# Patient Record
Sex: MALE | Race: WHITE | NOT HISPANIC OR LATINO | Employment: FULL TIME | URBAN - METROPOLITAN AREA
[De-identification: names, ages, dates, MRNs, and addresses within clinical notes are randomized per-mention and may not be internally consistent; named-entity substitution may affect disease eponyms.]

---

## 2017-05-11 ENCOUNTER — HOSPITAL ENCOUNTER (OUTPATIENT)
Dept: FAMILY MEDICINE CLINIC | Facility: CLINIC | Age: 52
Setting detail: SPECIMEN
Discharge: HOME | End: 2017-05-11
Attending: FAMILY MEDICINE | Admitting: FAMILY MEDICINE

## 2017-05-11 LAB
C TRACH RRNA SPEC QL PROBE: NORMAL
N GONORRHOEA RRNA SPEC QL PROBE: NORMAL
SPECIMEN SOURCE: NORMAL

## 2018-03-01 ENCOUNTER — HOSPITAL ENCOUNTER (OUTPATIENT)
Dept: FAMILY MEDICINE CLINIC | Facility: CLINIC | Age: 53
Setting detail: SPECIMEN
Discharge: HOME | End: 2018-03-01
Attending: FAMILY MEDICINE | Admitting: FAMILY MEDICINE

## 2018-09-10 ENCOUNTER — ON CAMPUS - OUTPATIENT (AMBULATORY)
Dept: URBAN - METROPOLITAN AREA HOSPITAL 2 | Facility: HOSPITAL | Age: 53
End: 2018-09-10
Payer: COMMERCIAL

## 2018-09-10 VITALS
DIASTOLIC BLOOD PRESSURE: 74 MMHG | HEART RATE: 61 BPM | DIASTOLIC BLOOD PRESSURE: 50 MMHG | HEART RATE: 58 BPM | RESPIRATION RATE: 18 BRPM | HEART RATE: 47 BPM | HEART RATE: 54 BPM | DIASTOLIC BLOOD PRESSURE: 57 MMHG | HEART RATE: 57 BPM | SYSTOLIC BLOOD PRESSURE: 110 MMHG | HEART RATE: 51 BPM | WEIGHT: 168 LBS | DIASTOLIC BLOOD PRESSURE: 72 MMHG | HEART RATE: 52 BPM | SYSTOLIC BLOOD PRESSURE: 88 MMHG | SYSTOLIC BLOOD PRESSURE: 126 MMHG | SYSTOLIC BLOOD PRESSURE: 89 MMHG | TEMPERATURE: 97.9 F | SYSTOLIC BLOOD PRESSURE: 83 MMHG | DIASTOLIC BLOOD PRESSURE: 58 MMHG | DIASTOLIC BLOOD PRESSURE: 51 MMHG | OXYGEN SATURATION: 100 % | SYSTOLIC BLOOD PRESSURE: 104 MMHG | DIASTOLIC BLOOD PRESSURE: 78 MMHG | SYSTOLIC BLOOD PRESSURE: 105 MMHG | HEIGHT: 69 IN | RESPIRATION RATE: 16 BRPM | HEART RATE: 53 BPM | DIASTOLIC BLOOD PRESSURE: 70 MMHG

## 2018-09-10 DIAGNOSIS — K64.1 SECOND DEGREE HEMORRHOIDS: ICD-10-CM

## 2018-09-10 DIAGNOSIS — K57.30 DIVERTICULOSIS OF LARGE INTESTINE WITHOUT PERFORATION OR ABS: ICD-10-CM

## 2018-09-10 DIAGNOSIS — Z80.0 FAMILY HISTORY OF MALIGNANT NEOPLASM OF DIGESTIVE ORGANS: ICD-10-CM

## 2018-09-10 DIAGNOSIS — D17.79 BENIGN LIPOMATOUS NEOPLASM OF OTHER SITES: ICD-10-CM

## 2018-09-10 PROCEDURE — 45378 DIAGNOSTIC COLONOSCOPY: CPT | Mod: 33 | Performed by: INTERNAL MEDICINE

## 2018-09-11 ENCOUNTER — HOSPITAL ENCOUNTER (OUTPATIENT)
Dept: FAMILY MEDICINE CLINIC | Facility: CLINIC | Age: 53
Setting detail: SPECIMEN
Discharge: HOME | End: 2018-09-11
Attending: FAMILY MEDICINE | Admitting: FAMILY MEDICINE

## 2018-09-11 LAB
ALBUMIN SERPL-MCNC: 4.3 G/DL (ref 3.5–4.8)
ALBUMIN/GLOB SERPL: 1.5 {RATIO} (ref 1–1.7)
ALP SERPL-CCNC: 44 IU/L (ref 32–91)
ALT SERPL-CCNC: 16 IU/L (ref 17–63)
ANION GAP SERPL CALC-SCNC: 12.2 MMOL/L (ref 10–20)
AST SERPL-CCNC: 19 IU/L (ref 15–41)
BASOPHILS # BLD AUTO: 0.1 10*3/UL (ref 0–0.2)
BASOPHILS NFR BLD AUTO: 1 % (ref 0–2)
BILIRUB SERPL-MCNC: 0.8 MG/DL (ref 0.3–1.2)
BILIRUB UR QL STRIP: NEGATIVE MG/DL
BUN SERPL-MCNC: 9 MG/DL (ref 8–20)
BUN/CREAT SERPL: 9 (ref 6.2–20.3)
CALCIUM SERPL-MCNC: 9.1 MG/DL (ref 8.9–10.3)
CASTS URNS QL MICRO: NORMAL /[LPF]
CHLORIDE SERPL-SCNC: 103 MMOL/L (ref 101–111)
CHOLEST SERPL-MCNC: 175 MG/DL
CHOLEST/HDLC SERPL: 2.1 {RATIO}
COLOR UR: YELLOW
CONV BACTERIA IN URINE MICRO: NEGATIVE
CONV CLARITY OF URINE: CLEAR
CONV CO2: 28 MMOL/L (ref 22–32)
CONV HYALINE CASTS IN URINE MICRO: 0 /[LPF] (ref 0–5)
CONV LDL CHOLESTEROL DIRECT: 72 MG/DL (ref 0–100)
CONV PROTEIN IN URINE BY AUTOMATED TEST STRIP: NEGATIVE MG/DL
CONV SMALL ROUND CELLS: NORMAL /[HPF]
CONV TOTAL PROTEIN: 7.1 G/DL (ref 6.1–7.9)
CONV UROBILINOGEN IN URINE BY AUTOMATED TEST STRIP: 0.2 MG/DL
CREAT UR-MCNC: 1 MG/DL (ref 0.7–1.2)
CULTURE INDICATED?: NORMAL
DIFFERENTIAL METHOD BLD: (no result)
EOSINOPHIL # BLD AUTO: 0.1 10*3/UL (ref 0–0.3)
EOSINOPHIL # BLD AUTO: 1 % (ref 0–3)
ERYTHROCYTE [DISTWIDTH] IN BLOOD BY AUTOMATED COUNT: 14.1 % (ref 11.5–14.5)
GLOBULIN UR ELPH-MCNC: 2.8 G/DL (ref 2.5–3.8)
GLUCOSE SERPL-MCNC: 86 MG/DL (ref 65–99)
GLUCOSE UR QL: NEGATIVE MG/DL
HCT VFR BLD AUTO: 39.9 % (ref 40–54)
HDLC SERPL-MCNC: 82 MG/DL
HGB BLD-MCNC: 13.6 G/DL (ref 14–18)
HGB UR QL STRIP: NEGATIVE
KETONES UR QL STRIP: NEGATIVE MG/DL
LDLC/HDLC SERPL: 0.9 {RATIO}
LEUKOCYTE ESTERASE UR QL STRIP: NEGATIVE
LIPID INTERPRETATION: NORMAL
LYMPHOCYTES # BLD AUTO: 1.8 10*3/UL (ref 0.8–4.8)
LYMPHOCYTES NFR BLD AUTO: 27 % (ref 18–42)
MCH RBC QN AUTO: 30.9 PG (ref 26–32)
MCHC RBC AUTO-ENTMCNC: 34.1 G/DL (ref 32–36)
MCV RBC AUTO: 90.7 FL (ref 80–94)
MONOCYTES # BLD AUTO: 0.4 10*3/UL (ref 0.1–1.3)
MONOCYTES NFR BLD AUTO: 5 % (ref 2–11)
NEUTROPHILS # BLD AUTO: 4.5 10*3/UL (ref 2.3–8.6)
NEUTROPHILS NFR BLD AUTO: 66 % (ref 50–75)
NITRITE UR QL STRIP: NEGATIVE
NRBC BLD AUTO-RTO: 0 /100{WBCS}
NRBC/RBC NFR BLD MANUAL: 0 10*3/UL
PH UR STRIP.AUTO: 6.5 [PH] (ref 4.5–8)
PLATELET # BLD AUTO: 226 10*3/UL (ref 150–450)
PMV BLD AUTO: 9 FL (ref 7.4–10.4)
POTASSIUM SERPL-SCNC: 4.2 MMOL/L (ref 3.6–5.1)
RBC # BLD AUTO: 4.4 10*6/UL (ref 4.6–6)
RBC #/AREA URNS HPF: 0 /[HPF] (ref 0–3)
SODIUM SERPL-SCNC: 139 MMOL/L (ref 136–144)
SP GR UR: 1.01 (ref 1–1.03)
SPERM URNS QL MICRO: NORMAL /[HPF]
SQUAMOUS SPT QL MICRO: 0 /[HPF] (ref 0–5)
TRIGL SERPL-MCNC: 45 MG/DL
UNIDENT CRYS URNS QL MICRO: NORMAL /[HPF]
VLDLC SERPL CALC-MCNC: 20.9 MG/DL
WBC # BLD AUTO: 6.8 10*3/UL (ref 4.5–11.5)
WBC #/AREA URNS HPF: 0 /[HPF] (ref 0–5)
YEAST SPEC QL WET PREP: NORMAL /[HPF]

## 2019-07-02 RX ORDER — DEXTROAMPHETAMINE SACCHARATE, AMPHETAMINE ASPARTATE, DEXTROAMPHETAMINE SULFATE AND AMPHETAMINE SULFATE 5; 5; 5; 5 MG/1; MG/1; MG/1; MG/1
1 TABLET ORAL 2 TIMES DAILY
COMMUNITY
Start: 2012-11-05 | End: 2019-07-02 | Stop reason: SDUPTHER

## 2019-07-02 RX ORDER — DEXTROAMPHETAMINE SACCHARATE, AMPHETAMINE ASPARTATE, DEXTROAMPHETAMINE SULFATE AND AMPHETAMINE SULFATE 5; 5; 5; 5 MG/1; MG/1; MG/1; MG/1
1 TABLET ORAL 2 TIMES DAILY
Qty: 60 TABLET | Refills: 0 | Status: SHIPPED | OUTPATIENT
Start: 2019-07-02 | End: 2019-08-02 | Stop reason: SDUPTHER

## 2019-08-02 RX ORDER — DEXTROAMPHETAMINE SACCHARATE, AMPHETAMINE ASPARTATE, DEXTROAMPHETAMINE SULFATE AND AMPHETAMINE SULFATE 5; 5; 5; 5 MG/1; MG/1; MG/1; MG/1
1 TABLET ORAL 2 TIMES DAILY
Qty: 60 TABLET | Refills: 0 | Status: SHIPPED | OUTPATIENT
Start: 2019-08-02 | End: 2019-09-04 | Stop reason: SDUPTHER

## 2019-09-04 RX ORDER — DEXTROAMPHETAMINE SACCHARATE, AMPHETAMINE ASPARTATE, DEXTROAMPHETAMINE SULFATE AND AMPHETAMINE SULFATE 5; 5; 5; 5 MG/1; MG/1; MG/1; MG/1
1 TABLET ORAL 2 TIMES DAILY
Qty: 60 TABLET | Refills: 0 | Status: SHIPPED | OUTPATIENT
Start: 2019-09-04 | End: 2019-10-17 | Stop reason: SDUPTHER

## 2019-09-10 RX ORDER — MELOXICAM 15 MG/1
TABLET ORAL
Qty: 90 TABLET | Refills: 0 | Status: SHIPPED | OUTPATIENT
Start: 2019-09-10 | End: 2019-12-15 | Stop reason: SDUPTHER

## 2019-10-11 RX ORDER — DEXTROAMPHETAMINE SACCHARATE, AMPHETAMINE ASPARTATE, DEXTROAMPHETAMINE SULFATE AND AMPHETAMINE SULFATE 5; 5; 5; 5 MG/1; MG/1; MG/1; MG/1
1 TABLET ORAL 2 TIMES DAILY
Qty: 60 TABLET | Refills: 0 | OUTPATIENT
Start: 2019-10-11

## 2019-10-14 PROBLEM — H72.92 PERFORATION OF LEFT TYMPANIC MEMBRANE: Status: ACTIVE | Noted: 2019-04-06

## 2019-10-14 PROBLEM — A63.0 GENITAL WARTS: Status: ACTIVE | Noted: 2019-04-04

## 2019-10-14 PROBLEM — M54.50 LOW BACK PAIN: Status: ACTIVE | Noted: 2019-04-04

## 2019-10-14 PROBLEM — Z00.00 ENCOUNTER FOR GENERAL ADULT MEDICAL EXAMINATION WITHOUT ABNORMAL FINDINGS: Status: ACTIVE | Noted: 2018-09-11

## 2019-10-14 RX ORDER — TRAMADOL HYDROCHLORIDE 50 MG/1
TABLET ORAL
COMMUNITY
Start: 2016-11-09 | End: 2020-10-07 | Stop reason: SDUPTHER

## 2019-10-14 RX ORDER — FLUTICASONE PROPIONATE 50 MCG
SPRAY, SUSPENSION (ML) NASAL
COMMUNITY
Start: 2015-12-28 | End: 2022-02-26

## 2019-10-14 RX ORDER — BETAMETHASONE DIPROPIONATE 0.05 %
1 GEL (GRAM) TOPICAL AS NEEDED
COMMUNITY
Start: 2019-04-04 | End: 2021-11-19 | Stop reason: SDUPTHER

## 2019-10-17 ENCOUNTER — OFFICE VISIT (OUTPATIENT)
Dept: FAMILY MEDICINE CLINIC | Facility: CLINIC | Age: 54
End: 2019-10-17

## 2019-10-17 VITALS
TEMPERATURE: 98 F | WEIGHT: 179 LBS | OXYGEN SATURATION: 98 % | DIASTOLIC BLOOD PRESSURE: 83 MMHG | HEART RATE: 63 BPM | RESPIRATION RATE: 16 BRPM | HEIGHT: 69 IN | BODY MASS INDEX: 26.51 KG/M2 | SYSTOLIC BLOOD PRESSURE: 131 MMHG

## 2019-10-17 DIAGNOSIS — Z00.00 ENCOUNTER FOR GENERAL ADULT MEDICAL EXAMINATION WITHOUT ABNORMAL FINDINGS: Primary | ICD-10-CM

## 2019-10-17 DIAGNOSIS — Z23 NEED FOR VACCINATION: ICD-10-CM

## 2019-10-17 PROBLEM — H72.92 PERFORATION OF LEFT TYMPANIC MEMBRANE: Status: RESOLVED | Noted: 2019-04-06 | Resolved: 2019-10-17

## 2019-10-17 LAB
ALBUMIN SERPL-MCNC: 4.8 G/DL (ref 3.5–5.2)
ALBUMIN/GLOB SERPL: 1.9 G/DL
ALP SERPL-CCNC: 50 U/L (ref 39–117)
ALT SERPL W P-5'-P-CCNC: 16 U/L (ref 1–41)
ANION GAP SERPL CALCULATED.3IONS-SCNC: 10.9 MMOL/L (ref 5–15)
AST SERPL-CCNC: 16 U/L (ref 1–40)
BASOPHILS # BLD AUTO: 0.05 10*3/MM3 (ref 0–0.2)
BASOPHILS NFR BLD AUTO: 0.7 % (ref 0–1.5)
BILIRUB SERPL-MCNC: 0.4 MG/DL (ref 0.2–1.2)
BUN BLD-MCNC: 15 MG/DL (ref 6–20)
BUN/CREAT SERPL: 15.6 (ref 7–25)
CALCIUM SPEC-SCNC: 9.1 MG/DL (ref 8.6–10.5)
CHLORIDE SERPL-SCNC: 100 MMOL/L (ref 98–107)
CHOLEST SERPL-MCNC: 202 MG/DL (ref 0–200)
CO2 SERPL-SCNC: 26.1 MMOL/L (ref 22–29)
CREAT BLD-MCNC: 0.96 MG/DL (ref 0.76–1.27)
DEPRECATED RDW RBC AUTO: 43.4 FL (ref 37–54)
EOSINOPHIL # BLD AUTO: 0.15 10*3/MM3 (ref 0–0.4)
EOSINOPHIL NFR BLD AUTO: 2.2 % (ref 0.3–6.2)
ERYTHROCYTE [DISTWIDTH] IN BLOOD BY AUTOMATED COUNT: 13.3 % (ref 12.3–15.4)
GFR SERPL CREATININE-BSD FRML MDRD: 82 ML/MIN/1.73
GLOBULIN UR ELPH-MCNC: 2.5 GM/DL
GLUCOSE BLD-MCNC: 83 MG/DL (ref 65–99)
HCT VFR BLD AUTO: 42.4 % (ref 37.5–51)
HDLC SERPL-MCNC: 92 MG/DL (ref 40–60)
HGB BLD-MCNC: 14.1 G/DL (ref 13–17.7)
IMM GRANULOCYTES # BLD AUTO: 0.02 10*3/MM3 (ref 0–0.05)
IMM GRANULOCYTES NFR BLD AUTO: 0.3 % (ref 0–0.5)
LDLC SERPL CALC-MCNC: 103 MG/DL (ref 0–100)
LDLC/HDLC SERPL: 1.12 {RATIO}
LYMPHOCYTES # BLD AUTO: 2.43 10*3/MM3 (ref 0.7–3.1)
LYMPHOCYTES NFR BLD AUTO: 36.1 % (ref 19.6–45.3)
MCH RBC QN AUTO: 29.4 PG (ref 26.6–33)
MCHC RBC AUTO-ENTMCNC: 33.3 G/DL (ref 31.5–35.7)
MCV RBC AUTO: 88.3 FL (ref 79–97)
MONOCYTES # BLD AUTO: 0.48 10*3/MM3 (ref 0.1–0.9)
MONOCYTES NFR BLD AUTO: 7.1 % (ref 5–12)
NEUTROPHILS # BLD AUTO: 3.6 10*3/MM3 (ref 1.7–7)
NEUTROPHILS NFR BLD AUTO: 53.6 % (ref 42.7–76)
NRBC BLD AUTO-RTO: 0 /100 WBC (ref 0–0.2)
PLATELET # BLD AUTO: 245 10*3/MM3 (ref 140–450)
PMV BLD AUTO: 10.6 FL (ref 6–12)
POTASSIUM BLD-SCNC: 5 MMOL/L (ref 3.5–5.2)
PROT SERPL-MCNC: 7.3 G/DL (ref 6–8.5)
PSA SERPL-MCNC: 0.79 NG/ML (ref 0–4)
RBC # BLD AUTO: 4.8 10*6/MM3 (ref 4.14–5.8)
SODIUM BLD-SCNC: 137 MMOL/L (ref 136–145)
TRIGL SERPL-MCNC: 36 MG/DL (ref 0–150)
TSH SERPL DL<=0.05 MIU/L-ACNC: 1.25 UIU/ML (ref 0.27–4.2)
VLDLC SERPL-MCNC: 7.2 MG/DL (ref 5–40)
WBC NRBC COR # BLD: 6.73 10*3/MM3 (ref 3.4–10.8)

## 2019-10-17 PROCEDURE — 90471 IMMUNIZATION ADMIN: CPT | Performed by: FAMILY MEDICINE

## 2019-10-17 PROCEDURE — 80061 LIPID PANEL: CPT | Performed by: FAMILY MEDICINE

## 2019-10-17 PROCEDURE — 84443 ASSAY THYROID STIM HORMONE: CPT | Performed by: FAMILY MEDICINE

## 2019-10-17 PROCEDURE — 36415 COLL VENOUS BLD VENIPUNCTURE: CPT | Performed by: FAMILY MEDICINE

## 2019-10-17 PROCEDURE — 90750 HZV VACC RECOMBINANT IM: CPT | Performed by: FAMILY MEDICINE

## 2019-10-17 PROCEDURE — 80053 COMPREHEN METABOLIC PANEL: CPT | Performed by: FAMILY MEDICINE

## 2019-10-17 PROCEDURE — 85025 COMPLETE CBC W/AUTO DIFF WBC: CPT | Performed by: FAMILY MEDICINE

## 2019-10-17 PROCEDURE — G0103 PSA SCREENING: HCPCS | Performed by: FAMILY MEDICINE

## 2019-10-17 PROCEDURE — 99396 PREV VISIT EST AGE 40-64: CPT | Performed by: FAMILY MEDICINE

## 2019-10-17 RX ORDER — DEXTROAMPHETAMINE SACCHARATE, AMPHETAMINE ASPARTATE, DEXTROAMPHETAMINE SULFATE AND AMPHETAMINE SULFATE 5; 5; 5; 5 MG/1; MG/1; MG/1; MG/1
1 TABLET ORAL 2 TIMES DAILY
Qty: 60 TABLET | Refills: 0 | Status: SHIPPED | OUTPATIENT
Start: 2019-10-17 | End: 2019-11-18 | Stop reason: SDUPTHER

## 2019-10-17 NOTE — PROGRESS NOTES
Subjective   Chief Complaint   Patient presents with   • Annual Exam     Jovany Taveras is a 54 y.o. male.     Patient Care Team:  Debra Frazier MD as PCP - General    He is coming in today for his annual wellness exam.  He reports doing well and he denies any concerns.  He stays very active and he eats healthy diet.  He also needs refill on his Adderall today, which he takes for his ADD and has been on this medication for many years. Patient denies any chest pain, shortness of breath, dizziness, nausea, vomiting, or diarrhea. No visual issues reported. No headaches, numbness or tingling. No urinary issues. Patient has good appetite and denies any weight changes. No swelling reported. No emotional issues.           The following portions of the patient's history were reviewed and updated as appropriate: allergies, current medications, past family history, past medical history, past social history, past surgical history and problem list.  Past Medical History:   Diagnosis Date   • ADHD    • Allergic rhinitis    • Chlamydia infection    • Genital warts    • Osteoarthritis    • Vitamin D deficiency      Past Surgical History:   Procedure Laterality Date   • COLONOSCOPY  09/10/2018    polyps removed; 5 years repeat   • TONSILLECTOMY       The patient has a family history of  Family History   Problem Relation Age of Onset   • Hypertension Father    • Osteoarthritis Father    • Osteoporosis Father      Social History     Socioeconomic History   • Marital status: Single     Spouse name: Not on file   • Number of children: Not on file   • Years of education: Not on file   • Highest education level: Not on file   Tobacco Use   • Smoking status: Never Smoker   • Smokeless tobacco: Never Used   Substance and Sexual Activity   • Alcohol use: Yes   • Drug use: No   • Sexual activity: Yes       Review of Systems   Constitutional: Negative for activity change, appetite change, chills, fatigue, fever, unexpected weight gain  "and unexpected weight loss.   HENT: Negative for congestion, ear pain, hearing loss, nosebleeds, postnasal drip, swollen glands, tinnitus and trouble swallowing.    Eyes: Negative for blurred vision, double vision and visual disturbance.   Respiratory: Negative for cough, choking, chest tightness, shortness of breath, wheezing and stridor.    Cardiovascular: Negative for chest pain, palpitations and leg swelling.   Gastrointestinal: Negative for abdominal distention, abdominal pain, anal bleeding, constipation, diarrhea, nausea, vomiting and GERD.   Endocrine: Negative for cold intolerance, heat intolerance and polyphagia.   Genitourinary: Negative for dysuria, flank pain, frequency, hematuria and urgency.   Musculoskeletal: Negative for arthralgias, back pain, gait problem, joint swelling and neck pain.   Skin: Negative for color change, dry skin and skin lesions.   Allergic/Immunologic: Negative for environmental allergies and food allergies.   Neurological: Negative for dizziness, tremors, speech difficulty, light-headedness, numbness, headache and confusion.   Hematological: Negative for adenopathy. Does not bruise/bleed easily.   Psychiatric/Behavioral: Negative for decreased concentration, sleep disturbance, depressed mood and stress.     Visit Vitals  /83 (BP Location: Left arm, Patient Position: Sitting, Cuff Size: Large Adult)   Pulse 63   Temp 98 °F (36.7 °C) (Oral)   Resp 16   Ht 175.3 cm (69\")   Wt 81.2 kg (179 lb)   SpO2 98%   BMI 26.43 kg/m²       Current Outpatient Medications:   •  betamethasone, augmented, (DIPROLENE) 0.05 % gel, BETAMETHASONE DIPROPIONATE AUG 0.05 % GEL, Disp: , Rfl:   •  fluticasone (FLONASE) 50 MCG/ACT nasal spray, FLUTICASONE PROPIONATE 50 MCG/ACT SUSP, Disp: , Rfl:   •  traMADol (ULTRAM) 50 MG tablet, TRAMADOL HCL 50 MG TABS, Disp: , Rfl:   •  amphetamine-dextroamphetamine (ADDERALL) 20 MG tablet, Take 1 tablet by mouth 2 (Two) Times a Day., Disp: 60 tablet, Rfl: 0  •  " meloxicam (MOBIC) 15 MG tablet, TAKE 1 TABLET BY MOUTH DAILY, Disp: 90 tablet, Rfl: 0    Objective   Physical Exam   Constitutional: He is oriented to person, place, and time. He appears well-developed and well-nourished. No distress.   HENT:   Head: Normocephalic and atraumatic.   Right Ear: External ear normal.   Left Ear: External ear normal.   Mouth/Throat: Oropharynx is clear and moist.   Eyes: Conjunctivae and EOM are normal. Pupils are equal, round, and reactive to light. Right eye exhibits no discharge. Left eye exhibits no discharge. No scleral icterus.   Neck: Normal range of motion. Neck supple. No JVD present. No thyromegaly present.   Cardiovascular: Normal rate, regular rhythm, normal heart sounds and intact distal pulses. Exam reveals no gallop and no friction rub.   No murmur heard.  Pulmonary/Chest: Effort normal and breath sounds normal. No respiratory distress. He has no wheezes. He has no rales.   Abdominal: Soft. Bowel sounds are normal. He exhibits no distension and no mass. There is no tenderness. There is no rebound and no guarding. No hernia.   Musculoskeletal: Normal range of motion. He exhibits no edema, tenderness or deformity.   Lymphadenopathy:     He has no cervical adenopathy.   Neurological: He is alert and oriented to person, place, and time. He displays normal reflexes. No cranial nerve deficit or sensory deficit.   Skin: Skin is warm and dry. Capillary refill takes less than 2 seconds. No rash noted. He is not diaphoretic. No erythema. No pallor.   Psychiatric: He has a normal mood and affect. His behavior is normal. Judgment normal.   Nursing note and vitals reviewed.           Office Visit on 10/17/2019   Component Date Value Ref Range Status   • WBC 10/17/2019 6.73  3.40 - 10.80 10*3/mm3 Final   • RBC 10/17/2019 4.80  4.14 - 5.80 10*6/mm3 Final   • Hemoglobin 10/17/2019 14.1  13.0 - 17.7 g/dL Final   • Hematocrit 10/17/2019 42.4  37.5 - 51.0 % Final   • MCV 10/17/2019 88.3   79.0 - 97.0 fL Final   • MCH 10/17/2019 29.4  26.6 - 33.0 pg Final   • MCHC 10/17/2019 33.3  31.5 - 35.7 g/dL Final   • RDW 10/17/2019 13.3  12.3 - 15.4 % Final   • RDW-SD 10/17/2019 43.4  37.0 - 54.0 fl Final   • MPV 10/17/2019 10.6  6.0 - 12.0 fL Final   • Platelets 10/17/2019 245  140 - 450 10*3/mm3 Final   • Neutrophil % 10/17/2019 53.6  42.7 - 76.0 % Final   • Lymphocyte % 10/17/2019 36.1  19.6 - 45.3 % Final   • Monocyte % 10/17/2019 7.1  5.0 - 12.0 % Final   • Eosinophil % 10/17/2019 2.2  0.3 - 6.2 % Final   • Basophil % 10/17/2019 0.7  0.0 - 1.5 % Final   • Immature Grans % 10/17/2019 0.3  0.0 - 0.5 % Final   • Neutrophils, Absolute 10/17/2019 3.60  1.70 - 7.00 10*3/mm3 Final   • Lymphocytes, Absolute 10/17/2019 2.43  0.70 - 3.10 10*3/mm3 Final   • Monocytes, Absolute 10/17/2019 0.48  0.10 - 0.90 10*3/mm3 Final   • Eosinophils, Absolute 10/17/2019 0.15  0.00 - 0.40 10*3/mm3 Final   • Basophils, Absolute 10/17/2019 0.05  0.00 - 0.20 10*3/mm3 Final   • Immature Grans, Absolute 10/17/2019 0.02  0.00 - 0.05 10*3/mm3 Final   • nRBC 10/17/2019 0.0  0.0 - 0.2 /100 WBC Final   • Glucose 10/17/2019 83  65 - 99 mg/dL Final   • BUN 10/17/2019 15  6 - 20 mg/dL Final   • Creatinine 10/17/2019 0.96  0.76 - 1.27 mg/dL Final   • Sodium 10/17/2019 137  136 - 145 mmol/L Final   • Potassium 10/17/2019 5.0  3.5 - 5.2 mmol/L Final   • Chloride 10/17/2019 100  98 - 107 mmol/L Final   • CO2 10/17/2019 26.1  22.0 - 29.0 mmol/L Final   • Calcium 10/17/2019 9.1  8.6 - 10.5 mg/dL Final   • Total Protein 10/17/2019 7.3  6.0 - 8.5 g/dL Final   • Albumin 10/17/2019 4.80  3.50 - 5.20 g/dL Final   • ALT (SGPT) 10/17/2019 16  1 - 41 U/L Final   • AST (SGOT) 10/17/2019 16  1 - 40 U/L Final   • Alkaline Phosphatase 10/17/2019 50  39 - 117 U/L Final   • Total Bilirubin 10/17/2019 0.4  0.2 - 1.2 mg/dL Final   • eGFR Non African Amer 10/17/2019 82  >60 mL/min/1.73 Final   • Globulin 10/17/2019 2.5  gm/dL Final   • A/G Ratio 10/17/2019 1.9  g/dL  Final   • BUN/Creatinine Ratio 10/17/2019 15.6  7.0 - 25.0 Final   • Anion Gap 10/17/2019 10.9  5.0 - 15.0 mmol/L Final   • Total Cholesterol 10/17/2019 202* 0 - 200 mg/dL Final   • Triglycerides 10/17/2019 36  0 - 150 mg/dL Final   • HDL Cholesterol 10/17/2019 92* 40 - 60 mg/dL Final   • LDL Cholesterol  10/17/2019 103* 0 - 100 mg/dL Final   • VLDL Cholesterol 10/17/2019 7.2  5 - 40 mg/dL Final   • LDL/HDL Ratio 10/17/2019 1.12   Final   • TSH 10/17/2019 1.250  0.270 - 4.200 uIU/mL Final   • PSA 10/17/2019 0.789  0.000 - 4.000 ng/mL Final         Lab Results   Component Value Date    BUN 15 10/17/2019    CREATININE 0.96 10/17/2019    EGFRIFNONA 82 10/17/2019     10/17/2019    K 5.0 10/17/2019     10/17/2019    CALCIUM 9.1 10/17/2019    ALBUMIN 4.80 10/17/2019    BILITOT 0.4 10/17/2019    ALKPHOS 50 10/17/2019    AST 16 10/17/2019    ALT 16 10/17/2019    TRIG 36 10/17/2019    HDL 92 (H) 10/17/2019    VLDL 7.2 10/17/2019     (H) 10/17/2019    LDLHDL 1.12 10/17/2019    WBC 6.73 10/17/2019    RBC 4.80 10/17/2019    HCT 42.4 10/17/2019    MCV 88.3 10/17/2019    MCH 29.4 10/17/2019    TSH 1.250 10/17/2019    PSA 0.789 10/17/2019          Assessment/Plan   Problems Addressed this Visit        Other    Encounter for general adult medical examination without abnormal findings - Primary    Relevant Orders    CBC Auto Differential (Completed)    Comprehensive Metabolic Panel (Completed)    Lipid Panel (Completed)    TSH (Completed)    PSA Screen (Completed)    Need for vaccination    Relevant Orders    Shingrix Vaccine (Completed)        Wellness exam was done today - see above for details. Healthy life style was reviewed and discussed and re-enforced. Regular exercise and healthy diet were also discussed and recommended.  I will be getting fasting blood work.  His last colonoscopy was in September 2018 and 5-year follow-up was recommended.  He was given his first Shingrix shot today.                Requested Prescriptions     Signed Prescriptions Disp Refills   • amphetamine-dextroamphetamine (ADDERALL) 20 MG tablet 60 tablet 0     Sig: Take 1 tablet by mouth 2 (Two) Times a Day.

## 2019-11-19 RX ORDER — DEXTROAMPHETAMINE SACCHARATE, AMPHETAMINE ASPARTATE, DEXTROAMPHETAMINE SULFATE AND AMPHETAMINE SULFATE 5; 5; 5; 5 MG/1; MG/1; MG/1; MG/1
1 TABLET ORAL 2 TIMES DAILY
Qty: 60 TABLET | Refills: 0 | Status: SHIPPED | OUTPATIENT
Start: 2019-11-19 | End: 2019-12-19 | Stop reason: SDUPTHER

## 2019-12-15 RX ORDER — MELOXICAM 15 MG/1
TABLET ORAL
Qty: 90 TABLET | Refills: 0 | Status: SHIPPED | OUTPATIENT
Start: 2019-12-15 | End: 2020-03-16

## 2019-12-19 DIAGNOSIS — E55.9 VITAMIN D DEFICIENCY: Primary | ICD-10-CM

## 2019-12-19 RX ORDER — DEXTROAMPHETAMINE SACCHARATE, AMPHETAMINE ASPARTATE, DEXTROAMPHETAMINE SULFATE AND AMPHETAMINE SULFATE 5; 5; 5; 5 MG/1; MG/1; MG/1; MG/1
1 TABLET ORAL 2 TIMES DAILY
Qty: 60 TABLET | Refills: 0 | Status: SHIPPED | OUTPATIENT
Start: 2019-12-19 | End: 2020-01-20 | Stop reason: SDUPTHER

## 2020-01-20 DIAGNOSIS — E55.9 VITAMIN D DEFICIENCY: ICD-10-CM

## 2020-01-20 RX ORDER — DEXTROAMPHETAMINE SACCHARATE, AMPHETAMINE ASPARTATE, DEXTROAMPHETAMINE SULFATE AND AMPHETAMINE SULFATE 5; 5; 5; 5 MG/1; MG/1; MG/1; MG/1
1 TABLET ORAL 2 TIMES DAILY
Qty: 60 TABLET | Refills: 0 | Status: SHIPPED | OUTPATIENT
Start: 2020-01-20 | End: 2020-02-18 | Stop reason: SDUPTHER

## 2020-02-18 DIAGNOSIS — E55.9 VITAMIN D DEFICIENCY: ICD-10-CM

## 2020-02-18 RX ORDER — DEXTROAMPHETAMINE SACCHARATE, AMPHETAMINE ASPARTATE, DEXTROAMPHETAMINE SULFATE AND AMPHETAMINE SULFATE 5; 5; 5; 5 MG/1; MG/1; MG/1; MG/1
1 TABLET ORAL 2 TIMES DAILY
Qty: 60 TABLET | Refills: 0 | Status: SHIPPED | OUTPATIENT
Start: 2020-02-18 | End: 2020-03-19 | Stop reason: SDUPTHER

## 2020-03-16 RX ORDER — MELOXICAM 15 MG/1
TABLET ORAL
Qty: 90 TABLET | Refills: 0 | Status: SHIPPED | OUTPATIENT
Start: 2020-03-16 | End: 2020-04-20 | Stop reason: SDUPTHER

## 2020-03-19 ENCOUNTER — TELEPHONE (OUTPATIENT)
Dept: FAMILY MEDICINE CLINIC | Facility: CLINIC | Age: 55
End: 2020-03-19

## 2020-03-19 DIAGNOSIS — E55.9 VITAMIN D DEFICIENCY: ICD-10-CM

## 2020-03-19 RX ORDER — DEXTROAMPHETAMINE SACCHARATE, AMPHETAMINE ASPARTATE, DEXTROAMPHETAMINE SULFATE AND AMPHETAMINE SULFATE 5; 5; 5; 5 MG/1; MG/1; MG/1; MG/1
1 TABLET ORAL 2 TIMES DAILY
Qty: 60 TABLET | Refills: 0 | Status: SHIPPED | OUTPATIENT
Start: 2020-03-19 | End: 2020-04-20 | Stop reason: SDUPTHER

## 2020-04-20 ENCOUNTER — OFFICE VISIT (OUTPATIENT)
Dept: FAMILY MEDICINE CLINIC | Facility: CLINIC | Age: 55
End: 2020-04-20

## 2020-04-20 DIAGNOSIS — M54.50 CHRONIC BILATERAL LOW BACK PAIN WITHOUT SCIATICA: Primary | ICD-10-CM

## 2020-04-20 DIAGNOSIS — E55.9 VITAMIN D DEFICIENCY: ICD-10-CM

## 2020-04-20 DIAGNOSIS — F98.8 ATTENTION DEFICIT DISORDER (ADD) WITHOUT HYPERACTIVITY: ICD-10-CM

## 2020-04-20 DIAGNOSIS — G89.29 CHRONIC BILATERAL LOW BACK PAIN WITHOUT SCIATICA: Primary | ICD-10-CM

## 2020-04-20 PROBLEM — Z23 NEED FOR VACCINATION: Status: RESOLVED | Noted: 2019-10-17 | Resolved: 2020-04-20

## 2020-04-20 PROCEDURE — 99442 PR PHYS/QHP TELEPHONE EVALUATION 11-20 MIN: CPT | Performed by: FAMILY MEDICINE

## 2020-04-20 RX ORDER — DEXTROAMPHETAMINE SACCHARATE, AMPHETAMINE ASPARTATE, DEXTROAMPHETAMINE SULFATE AND AMPHETAMINE SULFATE 5; 5; 5; 5 MG/1; MG/1; MG/1; MG/1
1 TABLET ORAL 2 TIMES DAILY
Qty: 60 TABLET | Refills: 0 | OUTPATIENT
Start: 2020-04-20

## 2020-04-20 RX ORDER — DEXTROAMPHETAMINE SACCHARATE, AMPHETAMINE ASPARTATE, DEXTROAMPHETAMINE SULFATE AND AMPHETAMINE SULFATE 5; 5; 5; 5 MG/1; MG/1; MG/1; MG/1
1 TABLET ORAL 2 TIMES DAILY
Qty: 60 TABLET | Refills: 0 | Status: SHIPPED | OUTPATIENT
Start: 2020-04-20 | End: 2020-05-20 | Stop reason: SDUPTHER

## 2020-04-20 RX ORDER — MELOXICAM 15 MG/1
15 TABLET ORAL DAILY
Qty: 90 TABLET | Refills: 1 | Status: SHIPPED | OUTPATIENT
Start: 2020-04-20 | End: 2020-06-08

## 2020-04-20 NOTE — PROGRESS NOTES
Subjective   Chief Complaint   Patient presents with   • ADHD   • Med Refill   • Osteoarthritis   • Back Pain     Jovany Taveras is a 55 y.o. male.     Patient Care Team:  Debra Frazier MD as PCP - General     You have chosen to receive care through a telephone visit. Do you consent to use a telephone visit for your medical care today? Yes      He is being evaluated today via telemedicine appointment and review of COVID-19 pandemic.  He is following up today on his ADHD and low back pain and osteoarthritis.  He takes Adderall and he has been on it for several years and he is doing well with this medication.  For his back pain he takes meloxicam on a daily basis and that helps control his pain a lot.  He also has some tramadol at home, but he takes this very sparingly and only when his pain is really bad.  He denies any medication side effects.  He is trying to stay pretty active and works out on a regular basis.       The following portions of the patient's history were reviewed and updated as appropriate: allergies, current medications, past family history, past medical history, past social history, past surgical history and problem list.  Past Medical History:   Diagnosis Date   • ADHD    • Allergic rhinitis    • Chlamydia infection    • Genital warts    • Osteoarthritis    • Vitamin D deficiency      Past Surgical History:   Procedure Laterality Date   • COLONOSCOPY  09/10/2018    polyps removed; 5 years repeat   • TONSILLECTOMY       The patient has a family history of  Family History   Problem Relation Age of Onset   • Hypertension Father    • Osteoarthritis Father    • Osteoporosis Father      Social History     Socioeconomic History   • Marital status: Single     Spouse name: Not on file   • Number of children: Not on file   • Years of education: Not on file   • Highest education level: Not on file   Tobacco Use   • Smoking status: Never Smoker   • Smokeless tobacco: Never Used   Substance and Sexual  Activity   • Alcohol use: Yes   • Drug use: No   • Sexual activity: Yes       Review of Systems   Constitutional: Negative for activity change, appetite change and fatigue.   Gastrointestinal: Negative for diarrhea, nausea and vomiting.   Musculoskeletal: Positive for arthralgias and back pain.   Skin: Negative for dry skin, rash and skin lesions.   Psychiatric/Behavioral: Positive for decreased concentration. Negative for agitation, behavioral problems, dysphoric mood, hallucinations, self-injury, sleep disturbance, suicidal ideas, negative for hyperactivity, depressed mood and stress. The patient is not nervous/anxious.      There were no vitals taken for this visit.    Current Outpatient Medications:   •  amphetamine-dextroamphetamine (Adderall) 20 MG tablet, Take 1 tablet by mouth 2 (Two) Times a Day., Disp: 60 tablet, Rfl: 0  •  betamethasone, augmented, (DIPROLENE) 0.05 % gel, BETAMETHASONE DIPROPIONATE AUG 0.05 % GEL, Disp: , Rfl:   •  fluticasone (FLONASE) 50 MCG/ACT nasal spray, FLUTICASONE PROPIONATE 50 MCG/ACT SUSP, Disp: , Rfl:   •  meloxicam (MOBIC) 15 MG tablet, Take 1 tablet by mouth Daily., Disp: 90 tablet, Rfl: 1  •  traMADol (ULTRAM) 50 MG tablet, TRAMADOL HCL 50 MG TABS, Disp: , Rfl:     Objective   Physical Exam   Constitutional: He is oriented to person, place, and time. No distress.   Patient was evaluated on the phone today.  He is pleasant and cooperative, he is in no distress, normal voice and speech.   Neurological: He is alert and oriented to person, place, and time.   Psychiatric: He has a normal mood and affect. Judgment and thought content normal.            No visits with results within 7 Day(s) from this visit.   Latest known visit with results is:   Office Visit on 10/17/2019   Component Date Value Ref Range Status   • WBC 10/17/2019 6.73  3.40 - 10.80 10*3/mm3 Final   • RBC 10/17/2019 4.80  4.14 - 5.80 10*6/mm3 Final   • Hemoglobin 10/17/2019 14.1  13.0 - 17.7 g/dL Final   •  Hematocrit 10/17/2019 42.4  37.5 - 51.0 % Final   • MCV 10/17/2019 88.3  79.0 - 97.0 fL Final   • MCH 10/17/2019 29.4  26.6 - 33.0 pg Final   • MCHC 10/17/2019 33.3  31.5 - 35.7 g/dL Final   • RDW 10/17/2019 13.3  12.3 - 15.4 % Final   • RDW-SD 10/17/2019 43.4  37.0 - 54.0 fl Final   • MPV 10/17/2019 10.6  6.0 - 12.0 fL Final   • Platelets 10/17/2019 245  140 - 450 10*3/mm3 Final   • Neutrophil % 10/17/2019 53.6  42.7 - 76.0 % Final   • Lymphocyte % 10/17/2019 36.1  19.6 - 45.3 % Final   • Monocyte % 10/17/2019 7.1  5.0 - 12.0 % Final   • Eosinophil % 10/17/2019 2.2  0.3 - 6.2 % Final   • Basophil % 10/17/2019 0.7  0.0 - 1.5 % Final   • Immature Grans % 10/17/2019 0.3  0.0 - 0.5 % Final   • Neutrophils, Absolute 10/17/2019 3.60  1.70 - 7.00 10*3/mm3 Final   • Lymphocytes, Absolute 10/17/2019 2.43  0.70 - 3.10 10*3/mm3 Final   • Monocytes, Absolute 10/17/2019 0.48  0.10 - 0.90 10*3/mm3 Final   • Eosinophils, Absolute 10/17/2019 0.15  0.00 - 0.40 10*3/mm3 Final   • Basophils, Absolute 10/17/2019 0.05  0.00 - 0.20 10*3/mm3 Final   • Immature Grans, Absolute 10/17/2019 0.02  0.00 - 0.05 10*3/mm3 Final   • nRBC 10/17/2019 0.0  0.0 - 0.2 /100 WBC Final   • Glucose 10/17/2019 83  65 - 99 mg/dL Final   • BUN 10/17/2019 15  6 - 20 mg/dL Final   • Creatinine 10/17/2019 0.96  0.76 - 1.27 mg/dL Final   • Sodium 10/17/2019 137  136 - 145 mmol/L Final   • Potassium 10/17/2019 5.0  3.5 - 5.2 mmol/L Final   • Chloride 10/17/2019 100  98 - 107 mmol/L Final   • CO2 10/17/2019 26.1  22.0 - 29.0 mmol/L Final   • Calcium 10/17/2019 9.1  8.6 - 10.5 mg/dL Final   • Total Protein 10/17/2019 7.3  6.0 - 8.5 g/dL Final   • Albumin 10/17/2019 4.80  3.50 - 5.20 g/dL Final   • ALT (SGPT) 10/17/2019 16  1 - 41 U/L Final   • AST (SGOT) 10/17/2019 16  1 - 40 U/L Final   • Alkaline Phosphatase 10/17/2019 50  39 - 117 U/L Final   • Total Bilirubin 10/17/2019 0.4  0.2 - 1.2 mg/dL Final   • eGFR Non African Amer 10/17/2019 82  >60 mL/min/1.73 Final   •  Globulin 10/17/2019 2.5  gm/dL Final   • A/G Ratio 10/17/2019 1.9  g/dL Final   • BUN/Creatinine Ratio 10/17/2019 15.6  7.0 - 25.0 Final   • Anion Gap 10/17/2019 10.9  5.0 - 15.0 mmol/L Final   • Total Cholesterol 10/17/2019 202* 0 - 200 mg/dL Final   • Triglycerides 10/17/2019 36  0 - 150 mg/dL Final   • HDL Cholesterol 10/17/2019 92* 40 - 60 mg/dL Final   • LDL Cholesterol  10/17/2019 103* 0 - 100 mg/dL Final   • VLDL Cholesterol 10/17/2019 7.2  5 - 40 mg/dL Final   • LDL/HDL Ratio 10/17/2019 1.12   Final   • TSH 10/17/2019 1.250  0.270 - 4.200 uIU/mL Final   • PSA 10/17/2019 0.789  0.000 - 4.000 ng/mL Final                 Assessment/Plan   Problems Addressed this Visit        Digestive    Vitamin D deficiency    Relevant Medications    amphetamine-dextroamphetamine (Adderall) 20 MG tablet       Nervous and Auditory    Low back pain - Primary       Other    Attention deficit disorder (ADD) without hyperactivity    Relevant Medications    amphetamine-dextroamphetamine (Adderall) 20 MG tablet        I reviewed his medical problems and his medications.  His symptoms of ADHD are well controlled with Adderall and he will continue the same.  I also refilled his meloxicam which he takes for his back issues.  All questions were answered.    I spent 15 minutes evaluating this patient's problems and concerns and working on this encounter.             Requested Prescriptions     Signed Prescriptions Disp Refills   • meloxicam (MOBIC) 15 MG tablet 90 tablet 1     Sig: Take 1 tablet by mouth Daily.   • amphetamine-dextroamphetamine (Adderall) 20 MG tablet 60 tablet 0     Sig: Take 1 tablet by mouth 2 (Two) Times a Day.

## 2020-05-20 DIAGNOSIS — E55.9 VITAMIN D DEFICIENCY: ICD-10-CM

## 2020-05-20 RX ORDER — DEXTROAMPHETAMINE SACCHARATE, AMPHETAMINE ASPARTATE, DEXTROAMPHETAMINE SULFATE AND AMPHETAMINE SULFATE 5; 5; 5; 5 MG/1; MG/1; MG/1; MG/1
1 TABLET ORAL 2 TIMES DAILY
Qty: 60 TABLET | Refills: 0 | Status: SHIPPED | OUTPATIENT
Start: 2020-05-20 | End: 2020-06-23 | Stop reason: SDUPTHER

## 2020-06-08 RX ORDER — MELOXICAM 15 MG/1
15 TABLET ORAL DAILY
Qty: 90 TABLET | Refills: 1 | Status: SHIPPED | OUTPATIENT
Start: 2020-06-08 | End: 2020-12-09

## 2020-06-23 DIAGNOSIS — E55.9 VITAMIN D DEFICIENCY: ICD-10-CM

## 2020-06-23 RX ORDER — DEXTROAMPHETAMINE SACCHARATE, AMPHETAMINE ASPARTATE, DEXTROAMPHETAMINE SULFATE AND AMPHETAMINE SULFATE 5; 5; 5; 5 MG/1; MG/1; MG/1; MG/1
1 TABLET ORAL 2 TIMES DAILY
Qty: 60 TABLET | Refills: 0 | Status: SHIPPED | OUTPATIENT
Start: 2020-06-23 | End: 2020-07-27 | Stop reason: SDUPTHER

## 2020-07-27 DIAGNOSIS — E55.9 VITAMIN D DEFICIENCY: ICD-10-CM

## 2020-07-27 RX ORDER — DEXTROAMPHETAMINE SACCHARATE, AMPHETAMINE ASPARTATE, DEXTROAMPHETAMINE SULFATE AND AMPHETAMINE SULFATE 5; 5; 5; 5 MG/1; MG/1; MG/1; MG/1
1 TABLET ORAL 2 TIMES DAILY
Qty: 60 TABLET | Refills: 0 | Status: SHIPPED | OUTPATIENT
Start: 2020-07-27 | End: 2020-08-31 | Stop reason: SDUPTHER

## 2020-08-31 DIAGNOSIS — E55.9 VITAMIN D DEFICIENCY: ICD-10-CM

## 2020-08-31 RX ORDER — DEXTROAMPHETAMINE SACCHARATE, AMPHETAMINE ASPARTATE, DEXTROAMPHETAMINE SULFATE AND AMPHETAMINE SULFATE 5; 5; 5; 5 MG/1; MG/1; MG/1; MG/1
1 TABLET ORAL 2 TIMES DAILY
Qty: 60 TABLET | Refills: 0 | Status: SHIPPED | OUTPATIENT
Start: 2020-08-31 | End: 2020-09-30 | Stop reason: SDUPTHER

## 2020-09-30 DIAGNOSIS — E55.9 VITAMIN D DEFICIENCY: ICD-10-CM

## 2020-09-30 RX ORDER — DEXTROAMPHETAMINE SACCHARATE, AMPHETAMINE ASPARTATE, DEXTROAMPHETAMINE SULFATE AND AMPHETAMINE SULFATE 5; 5; 5; 5 MG/1; MG/1; MG/1; MG/1
1 TABLET ORAL 2 TIMES DAILY
Qty: 60 TABLET | Refills: 0 | Status: SHIPPED | OUTPATIENT
Start: 2020-09-30 | End: 2020-10-07 | Stop reason: SDUPTHER

## 2020-10-07 ENCOUNTER — OFFICE VISIT (OUTPATIENT)
Dept: FAMILY MEDICINE CLINIC | Facility: CLINIC | Age: 55
End: 2020-10-07

## 2020-10-07 ENCOUNTER — TELEPHONE (OUTPATIENT)
Dept: FAMILY MEDICINE CLINIC | Facility: CLINIC | Age: 55
End: 2020-10-07

## 2020-10-07 ENCOUNTER — LAB (OUTPATIENT)
Dept: FAMILY MEDICINE CLINIC | Facility: CLINIC | Age: 55
End: 2020-10-07

## 2020-10-07 VITALS
HEIGHT: 69 IN | DIASTOLIC BLOOD PRESSURE: 94 MMHG | OXYGEN SATURATION: 97 % | HEART RATE: 65 BPM | WEIGHT: 187 LBS | SYSTOLIC BLOOD PRESSURE: 143 MMHG | TEMPERATURE: 98.2 F | RESPIRATION RATE: 16 BRPM | BODY MASS INDEX: 27.7 KG/M2

## 2020-10-07 DIAGNOSIS — G89.29 CHRONIC BILATERAL LOW BACK PAIN WITHOUT SCIATICA: ICD-10-CM

## 2020-10-07 DIAGNOSIS — Z11.3 SCREEN FOR STD (SEXUALLY TRANSMITTED DISEASE): ICD-10-CM

## 2020-10-07 DIAGNOSIS — F98.8 ATTENTION DEFICIT DISORDER (ADD) WITHOUT HYPERACTIVITY: ICD-10-CM

## 2020-10-07 DIAGNOSIS — Z12.5 PROSTATE CANCER SCREENING: ICD-10-CM

## 2020-10-07 DIAGNOSIS — M54.50 CHRONIC BILATERAL LOW BACK PAIN WITHOUT SCIATICA: ICD-10-CM

## 2020-10-07 DIAGNOSIS — Z00.00 PREVENTATIVE HEALTH CARE: Primary | ICD-10-CM

## 2020-10-07 DIAGNOSIS — Z23 NEED FOR VACCINATION: ICD-10-CM

## 2020-10-07 LAB — HBA1C MFR BLD: 5.4 % (ref 3.5–5.6)

## 2020-10-07 PROCEDURE — 86780 TREPONEMA PALLIDUM: CPT | Performed by: FAMILY MEDICINE

## 2020-10-07 PROCEDURE — 83036 HEMOGLOBIN GLYCOSYLATED A1C: CPT | Performed by: FAMILY MEDICINE

## 2020-10-07 PROCEDURE — 90471 IMMUNIZATION ADMIN: CPT | Performed by: FAMILY MEDICINE

## 2020-10-07 PROCEDURE — 90686 IIV4 VACC NO PRSV 0.5 ML IM: CPT | Performed by: FAMILY MEDICINE

## 2020-10-07 PROCEDURE — 90750 HZV VACC RECOMBINANT IM: CPT | Performed by: FAMILY MEDICINE

## 2020-10-07 PROCEDURE — 86696 HERPES SIMPLEX TYPE 2 TEST: CPT | Performed by: FAMILY MEDICINE

## 2020-10-07 PROCEDURE — 80061 LIPID PANEL: CPT | Performed by: FAMILY MEDICINE

## 2020-10-07 PROCEDURE — 85025 COMPLETE CBC W/AUTO DIFF WBC: CPT | Performed by: FAMILY MEDICINE

## 2020-10-07 PROCEDURE — 84443 ASSAY THYROID STIM HORMONE: CPT | Performed by: FAMILY MEDICINE

## 2020-10-07 PROCEDURE — 80053 COMPREHEN METABOLIC PANEL: CPT | Performed by: FAMILY MEDICINE

## 2020-10-07 PROCEDURE — 87591 N.GONORRHOEAE DNA AMP PROB: CPT | Performed by: FAMILY MEDICINE

## 2020-10-07 PROCEDURE — 86803 HEPATITIS C AB TEST: CPT | Performed by: FAMILY MEDICINE

## 2020-10-07 PROCEDURE — 87491 CHLMYD TRACH DNA AMP PROBE: CPT | Performed by: FAMILY MEDICINE

## 2020-10-07 PROCEDURE — 82306 VITAMIN D 25 HYDROXY: CPT | Performed by: FAMILY MEDICINE

## 2020-10-07 PROCEDURE — G0103 PSA SCREENING: HCPCS | Performed by: FAMILY MEDICINE

## 2020-10-07 PROCEDURE — G0432 EIA HIV-1/HIV-2 SCREEN: HCPCS | Performed by: FAMILY MEDICINE

## 2020-10-07 PROCEDURE — 90472 IMMUNIZATION ADMIN EACH ADD: CPT | Performed by: FAMILY MEDICINE

## 2020-10-07 PROCEDURE — 36415 COLL VENOUS BLD VENIPUNCTURE: CPT | Performed by: FAMILY MEDICINE

## 2020-10-07 PROCEDURE — 87340 HEPATITIS B SURFACE AG IA: CPT | Performed by: FAMILY MEDICINE

## 2020-10-07 PROCEDURE — 99396 PREV VISIT EST AGE 40-64: CPT | Performed by: FAMILY MEDICINE

## 2020-10-07 PROCEDURE — 81003 URINALYSIS AUTO W/O SCOPE: CPT | Performed by: FAMILY MEDICINE

## 2020-10-07 RX ORDER — TRAMADOL HYDROCHLORIDE 50 MG/1
50 TABLET ORAL DAILY PRN
Qty: 20 TABLET | Refills: 0 | Status: SHIPPED | OUTPATIENT
Start: 2020-10-07 | End: 2021-01-18 | Stop reason: SDUPTHER

## 2020-10-07 RX ORDER — DEXTROAMPHETAMINE SACCHARATE, AMPHETAMINE ASPARTATE, DEXTROAMPHETAMINE SULFATE AND AMPHETAMINE SULFATE 5; 5; 5; 5 MG/1; MG/1; MG/1; MG/1
1 TABLET ORAL 2 TIMES DAILY
Qty: 60 TABLET | Refills: 0 | Status: SHIPPED | OUTPATIENT
Start: 2020-10-07 | End: 2020-11-02 | Stop reason: SDUPTHER

## 2020-10-07 NOTE — TELEPHONE ENCOUNTER
----- Message from DICK Emmanuel sent at 10/18/2017  9:48 AM CDT -----  Please notify pt of unremarkable labs. (fax her biometric form please).     He had testosterone level checked in September 2018.  This results should be in his centricity account.  Please can you look it up and have it scanned into the epic.  Thank you.

## 2020-10-07 NOTE — PROGRESS NOTES
Subjective   Chief Complaint   Patient presents with   • Annual Exam     Jovany Taveras is a 55 y.o. male.     Patient Care Team:  Debra Frazier MD as PCP - General    He is coming here today for his annual wellness exam and he is due for fasting blood work.  He also would like to get refill on his Adderall, which he has been on for quite some time to treat his ADHD.  He also would like refill on tramadol.  He takes tramadol very seldom and typically 20 pills last him for 6 months.  He is working full-time and his job is very physical.  He is also staying very active and exercises on a regular basis and he has been eating healthy diet.  However lately he has not been as active as he used to and has gained some weight. Patient denies any chest pain, shortness of breath, dizziness, nausea, vomiting, or diarrhea. No visual issues reported. No headaches, numbness or tingling. No urinary issues. Patient has good appetite and denies any weight changes. No swelling reported. No emotional issues.         The following portions of the patient's history were reviewed and updated as appropriate: allergies, current medications, past family history, past medical history, past social history, past surgical history and problem list.  Past Medical History:   Diagnosis Date   • ADHD    • Allergic rhinitis    • Chlamydia infection    • Genital warts    • Osteoarthritis    • Vitamin D deficiency      Past Surgical History:   Procedure Laterality Date   • COLONOSCOPY  09/10/2018    polyps removed; 5 years repeat   • TONSILLECTOMY       The patient has a family history of  Family History   Problem Relation Age of Onset   • Hypertension Father    • Osteoarthritis Father    • Osteoporosis Father      Social History     Socioeconomic History   • Marital status: Single     Spouse name: Not on file   • Number of children: Not on file   • Years of education: Not on file   • Highest education level: Not on file   Tobacco Use   • Smoking  "status: Never Smoker   • Smokeless tobacco: Never Used   Substance and Sexual Activity   • Alcohol use: Yes   • Drug use: No   • Sexual activity: Yes       Review of Systems   Constitutional: Negative for activity change, appetite change, chills, fatigue, fever, unexpected weight gain and unexpected weight loss.   HENT: Negative for congestion, ear pain, hearing loss, nosebleeds, postnasal drip, swollen glands, tinnitus and trouble swallowing.    Eyes: Negative for blurred vision, double vision and visual disturbance.   Respiratory: Negative for cough, choking, chest tightness, shortness of breath, wheezing and stridor.    Cardiovascular: Negative for chest pain, palpitations and leg swelling.   Gastrointestinal: Negative for abdominal distention, abdominal pain, anal bleeding, constipation, diarrhea, nausea, vomiting and GERD.   Endocrine: Negative for cold intolerance, heat intolerance and polyphagia.   Genitourinary: Negative for dysuria, flank pain, frequency, hematuria and urgency.   Musculoskeletal: Positive for arthralgias and back pain. Negative for gait problem, joint swelling and neck pain.   Skin: Negative for color change, dry skin and skin lesions.   Allergic/Immunologic: Negative for environmental allergies and food allergies.   Neurological: Negative for dizziness, tremors, speech difficulty, light-headedness, numbness, headache and confusion.   Hematological: Negative for adenopathy. Does not bruise/bleed easily.   Psychiatric/Behavioral: Positive for decreased concentration. Negative for self-injury, sleep disturbance, suicidal ideas, depressed mood and stress.     Visit Vitals  /94 (BP Location: Left arm, Patient Position: Sitting, Cuff Size: Large Adult)   Pulse 65   Temp 98.2 °F (36.8 °C) (Temporal)   Resp 16   Ht 175.3 cm (69\")   Wt 84.8 kg (187 lb)   SpO2 97%   BMI 27.62 kg/m²       Current Outpatient Medications:   •  amphetamine-dextroamphetamine (Adderall) 20 MG tablet, Take 1 tablet " by mouth 2 (Two) Times a Day., Disp: 60 tablet, Rfl: 0  •  betamethasone, augmented, (DIPROLENE) 0.05 % gel, BETAMETHASONE DIPROPIONATE AUG 0.05 % GEL, Disp: , Rfl:   •  fluticasone (FLONASE) 50 MCG/ACT nasal spray, FLUTICASONE PROPIONATE 50 MCG/ACT SUSP, Disp: , Rfl:   •  meloxicam (MOBIC) 15 MG tablet, TAKE 1 TABLET BY MOUTH DAILY, Disp: 90 tablet, Rfl: 1  •  traMADol (ULTRAM) 50 MG tablet, Take 1 tablet by mouth Daily As Needed for Moderate Pain ., Disp: 20 tablet, Rfl: 0    Objective   Physical Exam  Vitals signs and nursing note reviewed.   Constitutional:       General: He is not in acute distress.     Appearance: He is well-developed. He is not diaphoretic.   HENT:      Head: Normocephalic and atraumatic.      Right Ear: External ear normal.      Left Ear: External ear normal.   Eyes:      General: No scleral icterus.        Right eye: No discharge.         Left eye: No discharge.      Conjunctiva/sclera: Conjunctivae normal.      Pupils: Pupils are equal, round, and reactive to light.   Neck:      Musculoskeletal: Normal range of motion and neck supple. No muscular tenderness.      Thyroid: No thyromegaly.      Vascular: No JVD.   Cardiovascular:      Rate and Rhythm: Normal rate and regular rhythm.      Heart sounds: Normal heart sounds. No murmur. No friction rub. No gallop.    Pulmonary:      Effort: Pulmonary effort is normal. No respiratory distress.      Breath sounds: Normal breath sounds. No stridor. No wheezing, rhonchi or rales.   Abdominal:      General: Bowel sounds are normal. There is no distension.      Palpations: Abdomen is soft. There is no mass.      Tenderness: There is no abdominal tenderness. There is no guarding or rebound.      Hernia: No hernia is present.   Musculoskeletal: Normal range of motion.         General: No swelling, tenderness or deformity.      Right lower leg: No edema.      Left lower leg: No edema.   Lymphadenopathy:      Cervical: No cervical adenopathy.   Skin:      General: Skin is warm and dry.      Capillary Refill: Capillary refill takes less than 2 seconds.      Coloration: Skin is not pale.      Findings: No bruising, erythema, lesion or rash.   Neurological:      General: No focal deficit present.      Mental Status: He is alert and oriented to person, place, and time.      Cranial Nerves: No cranial nerve deficit.      Sensory: No sensory deficit.      Motor: No weakness.      Coordination: Coordination normal.      Deep Tendon Reflexes: Reflexes normal.   Psychiatric:         Mood and Affect: Mood normal.         Behavior: Behavior normal.         Judgment: Judgment normal.              No visits with results within 7 Day(s) from this visit.   Latest known visit with results is:   Office Visit on 10/17/2019   Component Date Value Ref Range Status   • WBC 10/17/2019 6.73  3.40 - 10.80 10*3/mm3 Final   • RBC 10/17/2019 4.80  4.14 - 5.80 10*6/mm3 Final   • Hemoglobin 10/17/2019 14.1  13.0 - 17.7 g/dL Final   • Hematocrit 10/17/2019 42.4  37.5 - 51.0 % Final   • MCV 10/17/2019 88.3  79.0 - 97.0 fL Final   • MCH 10/17/2019 29.4  26.6 - 33.0 pg Final   • MCHC 10/17/2019 33.3  31.5 - 35.7 g/dL Final   • RDW 10/17/2019 13.3  12.3 - 15.4 % Final   • RDW-SD 10/17/2019 43.4  37.0 - 54.0 fl Final   • MPV 10/17/2019 10.6  6.0 - 12.0 fL Final   • Platelets 10/17/2019 245  140 - 450 10*3/mm3 Final   • Neutrophil % 10/17/2019 53.6  42.7 - 76.0 % Final   • Lymphocyte % 10/17/2019 36.1  19.6 - 45.3 % Final   • Monocyte % 10/17/2019 7.1  5.0 - 12.0 % Final   • Eosinophil % 10/17/2019 2.2  0.3 - 6.2 % Final   • Basophil % 10/17/2019 0.7  0.0 - 1.5 % Final   • Immature Grans % 10/17/2019 0.3  0.0 - 0.5 % Final   • Neutrophils, Absolute 10/17/2019 3.60  1.70 - 7.00 10*3/mm3 Final   • Lymphocytes, Absolute 10/17/2019 2.43  0.70 - 3.10 10*3/mm3 Final   • Monocytes, Absolute 10/17/2019 0.48  0.10 - 0.90 10*3/mm3 Final   • Eosinophils, Absolute 10/17/2019 0.15  0.00 - 0.40 10*3/mm3 Final   •  Basophils, Absolute 10/17/2019 0.05  0.00 - 0.20 10*3/mm3 Final   • Immature Grans, Absolute 10/17/2019 0.02  0.00 - 0.05 10*3/mm3 Final   • nRBC 10/17/2019 0.0  0.0 - 0.2 /100 WBC Final   • Glucose 10/17/2019 83  65 - 99 mg/dL Final   • BUN 10/17/2019 15  6 - 20 mg/dL Final   • Creatinine 10/17/2019 0.96  0.76 - 1.27 mg/dL Final   • Sodium 10/17/2019 137  136 - 145 mmol/L Final   • Potassium 10/17/2019 5.0  3.5 - 5.2 mmol/L Final   • Chloride 10/17/2019 100  98 - 107 mmol/L Final   • CO2 10/17/2019 26.1  22.0 - 29.0 mmol/L Final   • Calcium 10/17/2019 9.1  8.6 - 10.5 mg/dL Final   • Total Protein 10/17/2019 7.3  6.0 - 8.5 g/dL Final   • Albumin 10/17/2019 4.80  3.50 - 5.20 g/dL Final   • ALT (SGPT) 10/17/2019 16  1 - 41 U/L Final   • AST (SGOT) 10/17/2019 16  1 - 40 U/L Final   • Alkaline Phosphatase 10/17/2019 50  39 - 117 U/L Final   • Total Bilirubin 10/17/2019 0.4  0.2 - 1.2 mg/dL Final   • eGFR Non African Amer 10/17/2019 82  >60 mL/min/1.73 Final   • Globulin 10/17/2019 2.5  gm/dL Final   • A/G Ratio 10/17/2019 1.9  g/dL Final   • BUN/Creatinine Ratio 10/17/2019 15.6  7.0 - 25.0 Final   • Anion Gap 10/17/2019 10.9  5.0 - 15.0 mmol/L Final   • Total Cholesterol 10/17/2019 202* 0 - 200 mg/dL Final   • Triglycerides 10/17/2019 36  0 - 150 mg/dL Final   • HDL Cholesterol 10/17/2019 92* 40 - 60 mg/dL Final   • LDL Cholesterol  10/17/2019 103* 0 - 100 mg/dL Final   • VLDL Cholesterol 10/17/2019 7.2  5 - 40 mg/dL Final   • LDL/HDL Ratio 10/17/2019 1.12   Final   • TSH 10/17/2019 1.250  0.270 - 4.200 uIU/mL Final   • PSA 10/17/2019 0.789  0.000 - 4.000 ng/mL Final                 Assessment/Plan   Problems Addressed this Visit        Nervous and Auditory    Low back pain    Relevant Medications    traMADol (ULTRAM) 50 MG tablet    Other Relevant Orders    Chlamydia trachomatis, Neisseria gonorrhoeae, PCR - Urine, Urine, Clean Catch       Other    Attention deficit disorder (ADD) without hyperactivity    Relevant  Medications    amphetamine-dextroamphetamine (Adderall) 20 MG tablet    Other Relevant Orders    Chlamydia trachomatis, Neisseria gonorrhoeae, PCR - Urine, Urine, Clean Catch    Preventative health care - Primary    Relevant Orders    CBC Auto Differential    Comprehensive Metabolic Panel    Lipid Panel    TSH    Vitamin D 25 Hydroxy    Urinalysis With Culture If Indicated - Urine, Clean Catch    Hepatitis C Antibody    Hemoglobin A1c    Chlamydia trachomatis, Neisseria gonorrhoeae, PCR - Urine, Urine, Clean Catch    Prostate cancer screening    Relevant Orders    PSA Screen    Chlamydia trachomatis, Neisseria gonorrhoeae, PCR - Urine, Urine, Clean Catch    Screen for STD (sexually transmitted disease)    Relevant Orders    Hepatitis B Surface Antigen    HIV-1 / O / 2 Ag / Antibody 4th Generation    HSV 2 Antibody, IgG    T. Pallidum (Syphilis) Screening Cascade    Chlamydia trachomatis, Neisseria gonorrhoeae, PCR - Urine, Urine, Clean Catch    Need for vaccination    Relevant Orders    FluLaval Quad >6 Months (4230-8129) (Completed)    Shingrix Vaccine (Completed)    Chlamydia trachomatis, Neisseria gonorrhoeae, PCR - Urine, Urine, Clean Catch      Diagnoses       Codes Comments    Preventative health care    -  Primary ICD-10-CM: Z00.00  ICD-9-CM: V70.0     Prostate cancer screening     ICD-10-CM: Z12.5  ICD-9-CM: V76.44     Attention deficit disorder (ADD) without hyperactivity     ICD-10-CM: F98.8  ICD-9-CM: 314.00     Chronic bilateral low back pain without sciatica     ICD-10-CM: M54.5, G89.29  ICD-9-CM: 724.2, 338.29     Screen for STD (sexually transmitted disease)     ICD-10-CM: Z11.3  ICD-9-CM: V74.5     Need for vaccination     ICD-10-CM: Z23  ICD-9-CM: V05.9         Wellness exam was done today - see above for details. Healthy life style was reviewed and discussed and re-enforced. Regular exercise and healthy diet were also discussed and recommended.  I will be getting fasting blood work.  I also  reviewed his health maintenance and his last colonoscopy was in September 2018.  His immunization was also reviewed and he was given Shingrix and a flu shot today.           Requested Prescriptions     Signed Prescriptions Disp Refills   • amphetamine-dextroamphetamine (Adderall) 20 MG tablet 60 tablet 0     Sig: Take 1 tablet by mouth 2 (Two) Times a Day.   • traMADol (ULTRAM) 50 MG tablet 20 tablet 0     Sig: Take 1 tablet by mouth Daily As Needed for Moderate Pain .

## 2020-10-08 ENCOUNTER — TELEPHONE (OUTPATIENT)
Dept: FAMILY MEDICINE CLINIC | Facility: CLINIC | Age: 55
End: 2020-10-08

## 2020-10-08 LAB
25(OH)D3 SERPL-MCNC: 45.7 NG/ML (ref 30–100)
ALBUMIN SERPL-MCNC: 4.8 G/DL (ref 3.5–5.2)
ALBUMIN/GLOB SERPL: 1.8 G/DL
ALP SERPL-CCNC: 54 U/L (ref 39–117)
ALT SERPL W P-5'-P-CCNC: 19 U/L (ref 1–41)
ANION GAP SERPL CALCULATED.3IONS-SCNC: 12 MMOL/L (ref 5–15)
AST SERPL-CCNC: 19 U/L (ref 1–40)
BASOPHILS # BLD AUTO: 0.04 10*3/MM3 (ref 0–0.2)
BASOPHILS NFR BLD AUTO: 0.6 % (ref 0–1.5)
BILIRUB SERPL-MCNC: 0.5 MG/DL (ref 0–1.2)
BILIRUB UR QL STRIP: NEGATIVE
BUN SERPL-MCNC: 24 MG/DL (ref 6–20)
BUN/CREAT SERPL: 23.3 (ref 7–25)
CALCIUM SPEC-SCNC: 10 MG/DL (ref 8.6–10.5)
CHLORIDE SERPL-SCNC: 101 MMOL/L (ref 98–107)
CHOLEST SERPL-MCNC: 210 MG/DL (ref 0–200)
CLARITY UR: CLEAR
CO2 SERPL-SCNC: 27 MMOL/L (ref 22–29)
COLOR UR: YELLOW
CREAT SERPL-MCNC: 1.03 MG/DL (ref 0.76–1.27)
DEPRECATED RDW RBC AUTO: 40.9 FL (ref 37–54)
EOSINOPHIL # BLD AUTO: 0.11 10*3/MM3 (ref 0–0.4)
EOSINOPHIL NFR BLD AUTO: 1.8 % (ref 0.3–6.2)
ERYTHROCYTE [DISTWIDTH] IN BLOOD BY AUTOMATED COUNT: 13 % (ref 12.3–15.4)
GFR SERPL CREATININE-BSD FRML MDRD: 76 ML/MIN/1.73
GLOBULIN UR ELPH-MCNC: 2.7 GM/DL
GLUCOSE SERPL-MCNC: 85 MG/DL (ref 65–99)
GLUCOSE UR STRIP-MCNC: NEGATIVE MG/DL
HBV SURFACE AG SERPL QL IA: NORMAL
HCT VFR BLD AUTO: 42.6 % (ref 37.5–51)
HCV AB SER DONR QL: NORMAL
HDLC SERPL-MCNC: 79 MG/DL (ref 40–60)
HGB BLD-MCNC: 14.5 G/DL (ref 13–17.7)
HGB UR QL STRIP.AUTO: NEGATIVE
HIV1+2 AB SER QL: NORMAL
HSV2 IGG SER IA-ACNC: <0.91 INDEX (ref 0–0.9)
IMM GRANULOCYTES # BLD AUTO: 0.01 10*3/MM3 (ref 0–0.05)
IMM GRANULOCYTES NFR BLD AUTO: 0.2 % (ref 0–0.5)
KETONES UR QL STRIP: NEGATIVE
LDLC SERPL CALC-MCNC: 116 MG/DL (ref 0–100)
LDLC/HDLC SERPL: 1.47 {RATIO}
LEUKOCYTE ESTERASE UR QL STRIP.AUTO: NEGATIVE
LYMPHOCYTES # BLD AUTO: 2.8 10*3/MM3 (ref 0.7–3.1)
LYMPHOCYTES NFR BLD AUTO: 45.4 % (ref 19.6–45.3)
MCH RBC QN AUTO: 29.7 PG (ref 26.6–33)
MCHC RBC AUTO-ENTMCNC: 34 G/DL (ref 31.5–35.7)
MCV RBC AUTO: 87.1 FL (ref 79–97)
MONOCYTES # BLD AUTO: 0.39 10*3/MM3 (ref 0.1–0.9)
MONOCYTES NFR BLD AUTO: 6.3 % (ref 5–12)
NEUTROPHILS NFR BLD AUTO: 2.82 10*3/MM3 (ref 1.7–7)
NEUTROPHILS NFR BLD AUTO: 45.7 % (ref 42.7–76)
NITRITE UR QL STRIP: NEGATIVE
NRBC BLD AUTO-RTO: 0 /100 WBC (ref 0–0.2)
PH UR STRIP.AUTO: 6.5 [PH] (ref 5–8)
PLATELET # BLD AUTO: 285 10*3/MM3 (ref 140–450)
PMV BLD AUTO: 10 FL (ref 6–12)
POTASSIUM SERPL-SCNC: 4.5 MMOL/L (ref 3.5–5.2)
PROT SERPL-MCNC: 7.5 G/DL (ref 6–8.5)
PROT UR QL STRIP: NEGATIVE
PSA SERPL-MCNC: 0.51 NG/ML (ref 0–4)
RBC # BLD AUTO: 4.89 10*6/MM3 (ref 4.14–5.8)
SODIUM SERPL-SCNC: 140 MMOL/L (ref 136–145)
SP GR UR STRIP: 1.01 (ref 1–1.03)
TREPONEMA PALLIDUM IGG+IGM AB [PRESENCE] IN SERUM OR PLASMA BY IMMUNOASSAY: NON REACTIVE
TRIGL SERPL-MCNC: 73 MG/DL (ref 0–150)
TSH SERPL DL<=0.05 MIU/L-ACNC: 0.95 UIU/ML (ref 0.27–4.2)
UROBILINOGEN UR QL STRIP: NORMAL
VLDLC SERPL-MCNC: 14.6 MG/DL (ref 5–40)
WBC # BLD AUTO: 6.17 10*3/MM3 (ref 3.4–10.8)

## 2020-10-08 NOTE — TELEPHONE ENCOUNTER
Shingles shot sometimes can cause mild body aches and low-grade fever within 24 hours.  I think it is reasonable to wait on see how he feels by tomorrow.  If his symptoms do not resolve we can possibly discuss in order COVID test.  Thank you.

## 2020-10-09 LAB
C TRACH RRNA SPEC QL NAA+PROBE: NEGATIVE
N GONORRHOEA RRNA SPEC QL NAA+PROBE: NEGATIVE

## 2020-11-02 DIAGNOSIS — F98.8 ATTENTION DEFICIT DISORDER (ADD) WITHOUT HYPERACTIVITY: ICD-10-CM

## 2020-11-02 RX ORDER — DEXTROAMPHETAMINE SACCHARATE, AMPHETAMINE ASPARTATE, DEXTROAMPHETAMINE SULFATE AND AMPHETAMINE SULFATE 5; 5; 5; 5 MG/1; MG/1; MG/1; MG/1
1 TABLET ORAL 2 TIMES DAILY
Qty: 60 TABLET | Refills: 0 | Status: SHIPPED | OUTPATIENT
Start: 2020-11-02 | End: 2020-12-02 | Stop reason: SDUPTHER

## 2020-12-02 DIAGNOSIS — F98.8 ATTENTION DEFICIT DISORDER (ADD) WITHOUT HYPERACTIVITY: ICD-10-CM

## 2020-12-02 RX ORDER — DEXTROAMPHETAMINE SACCHARATE, AMPHETAMINE ASPARTATE, DEXTROAMPHETAMINE SULFATE AND AMPHETAMINE SULFATE 5; 5; 5; 5 MG/1; MG/1; MG/1; MG/1
1 TABLET ORAL 2 TIMES DAILY
Qty: 60 TABLET | Refills: 0 | Status: SHIPPED | OUTPATIENT
Start: 2020-12-02 | End: 2021-01-04 | Stop reason: SDUPTHER

## 2020-12-09 RX ORDER — MELOXICAM 15 MG/1
15 TABLET ORAL DAILY
Qty: 90 TABLET | Refills: 1 | Status: SHIPPED | OUTPATIENT
Start: 2020-12-09 | End: 2021-01-18 | Stop reason: SDUPTHER

## 2021-01-04 DIAGNOSIS — F98.8 ATTENTION DEFICIT DISORDER (ADD) WITHOUT HYPERACTIVITY: ICD-10-CM

## 2021-01-04 RX ORDER — DEXTROAMPHETAMINE SACCHARATE, AMPHETAMINE ASPARTATE, DEXTROAMPHETAMINE SULFATE AND AMPHETAMINE SULFATE 5; 5; 5; 5 MG/1; MG/1; MG/1; MG/1
1 TABLET ORAL 2 TIMES DAILY
Qty: 60 TABLET | Refills: 0 | Status: SHIPPED | OUTPATIENT
Start: 2021-01-04 | End: 2021-02-05 | Stop reason: SDUPTHER

## 2021-01-18 ENCOUNTER — OFFICE VISIT (OUTPATIENT)
Dept: FAMILY MEDICINE CLINIC | Facility: CLINIC | Age: 56
End: 2021-01-18

## 2021-01-18 VITALS
BODY MASS INDEX: 28.29 KG/M2 | WEIGHT: 191 LBS | OXYGEN SATURATION: 97 % | HEART RATE: 73 BPM | TEMPERATURE: 97.3 F | SYSTOLIC BLOOD PRESSURE: 133 MMHG | HEIGHT: 69 IN | DIASTOLIC BLOOD PRESSURE: 81 MMHG | RESPIRATION RATE: 16 BRPM

## 2021-01-18 DIAGNOSIS — M54.50 CHRONIC BILATERAL LOW BACK PAIN WITHOUT SCIATICA: ICD-10-CM

## 2021-01-18 DIAGNOSIS — M19.012 PRIMARY OSTEOARTHRITIS OF LEFT SHOULDER: ICD-10-CM

## 2021-01-18 DIAGNOSIS — M25.512 ACUTE PAIN OF LEFT SHOULDER: Primary | ICD-10-CM

## 2021-01-18 DIAGNOSIS — G89.29 CHRONIC BILATERAL LOW BACK PAIN WITHOUT SCIATICA: ICD-10-CM

## 2021-01-18 PROBLEM — Z23 NEED FOR VACCINATION: Status: RESOLVED | Noted: 2020-10-07 | Resolved: 2021-01-18

## 2021-01-18 PROBLEM — Z11.3 SCREEN FOR STD (SEXUALLY TRANSMITTED DISEASE): Status: RESOLVED | Noted: 2020-10-07 | Resolved: 2021-01-18

## 2021-01-18 PROCEDURE — 99214 OFFICE O/P EST MOD 30 MIN: CPT | Performed by: FAMILY MEDICINE

## 2021-01-18 RX ORDER — MELOXICAM 15 MG/1
15 TABLET ORAL DAILY
Qty: 90 TABLET | Refills: 1
Start: 2021-01-18 | End: 2021-02-19 | Stop reason: HOSPADM

## 2021-01-18 RX ORDER — TRAMADOL HYDROCHLORIDE 50 MG/1
50 TABLET ORAL DAILY PRN
Qty: 20 TABLET | Refills: 0 | Status: SHIPPED | OUTPATIENT
Start: 2021-01-18 | End: 2021-02-19 | Stop reason: HOSPADM

## 2021-01-18 RX ORDER — KETOROLAC TROMETHAMINE 10 MG/1
TABLET, FILM COATED ORAL
COMMUNITY
Start: 2020-11-21 | End: 2021-01-18

## 2021-01-18 NOTE — PROGRESS NOTES
Subjective   Chief Complaint   Patient presents with   • Shoulder Pain     Left; fell while skiining on 1/14/2021     Jovany Taveras is a 55 y.o. male.     Patient Care Team:  Debra Frazier MD as PCP - General    He is coming in today due to new onset of pretty severe pain in the left shoulder.  He tells me that last week he went to ski in [place] in on 1/14/2021 he fell while skiing and landed on the left side and left arm and felt pretty instant pain in the left shoulder.  Since then he has affected range of motion because pain is getting much worse with movement.  He went to urgent care in [place] to get checked and he tells me that he had x-ray done there and was advised that his joint was in place and nothing was broken.  He is currently on meloxicam which he already takes for his chronic back issues.  He did not go to work today as his job is pretty physical and he cannot fully work without being able to use both of his hands and arms.  He denies any numbness or tingling.  He denies any other injuries.       The following portions of the patient's history were reviewed and updated as appropriate: allergies, current medications, past family history, past medical history, past social history, past surgical history and problem list.  Past Medical History:   Diagnosis Date   • ADHD    • Allergic rhinitis    • Chlamydia infection    • Genital warts    • Osteoarthritis    • Vitamin D deficiency      Past Surgical History:   Procedure Laterality Date   • COLONOSCOPY  09/10/2018    polyps removed; 5 years repeat   • TONSILLECTOMY       The patient has a family history of  Family History   Problem Relation Age of Onset   • Hypertension Father    • Osteoarthritis Father    • Osteoporosis Father      Social History     Socioeconomic History   • Marital status: Single     Spouse name: Not on file   • Number of children: Not on file   • Years of education: Not on file   • Highest education level: Not on file  "  Tobacco Use   • Smoking status: Never Smoker   • Smokeless tobacco: Never Used   Substance and Sexual Activity   • Alcohol use: Yes   • Drug use: No   • Sexual activity: Yes       Review of Systems   Musculoskeletal: Positive for arthralgias and back pain. Negative for gait problem, joint swelling, myalgias, neck pain and bursitis.   Skin: Negative for color change, rash, skin lesions and bruise.   Neurological: Negative for tremors, weakness, light-headedness and numbness.     Visit Vitals  /81 (BP Location: Right arm, Patient Position: Sitting, Cuff Size: Large Adult)   Pulse 73   Temp 97.3 °F (36.3 °C) (Temporal)   Resp 16   Ht 175.3 cm (69\")   Wt 86.6 kg (191 lb)   SpO2 97%   BMI 28.21 kg/m²       Current Outpatient Medications:   •  amphetamine-dextroamphetamine (Adderall) 20 MG tablet, Take 1 tablet by mouth 2 (Two) Times a Day., Disp: 60 tablet, Rfl: 0  •  betamethasone, augmented, (DIPROLENE) 0.05 % gel, BETAMETHASONE DIPROPIONATE AUG 0.05 % GEL, Disp: , Rfl:   •  fluticasone (FLONASE) 50 MCG/ACT nasal spray, FLUTICASONE PROPIONATE 50 MCG/ACT SUSP, Disp: , Rfl:   •  meloxicam (MOBIC) 15 MG tablet, Take 1 tablet by mouth Daily., Disp: 90 tablet, Rfl: 1  •  traMADol (ULTRAM) 50 MG tablet, Take 1 tablet by mouth Daily As Needed for Moderate Pain ., Disp: 20 tablet, Rfl: 0    Objective   Physical Exam  Constitutional:       General: He is not in acute distress.     Appearance: Normal appearance. He is well-developed. He is not ill-appearing or diaphoretic.      Comments: Patient is in no distress, patient has normal voice and speech.  Normal respiratory effort.   HENT:      Head: Normocephalic and atraumatic.   Neck:      Musculoskeletal: Normal range of motion and neck supple.   Pulmonary:      Effort: Pulmonary effort is normal.   Musculoskeletal:      Comments: Left shoulder was examined, there is significantly decreased active range of motion due to pain, passive range of motion is only slightly " affected.  There is joint line palpation tenderness.  Positive apprehension test.  Strong radial pulse.   Neurological:      General: No focal deficit present.      Mental Status: He is alert and oriented to person, place, and time. Mental status is at baseline.   Psychiatric:         Mood and Affect: Mood normal.         Assessment/Plan   Diagnoses and all orders for this visit:    1. Acute pain of left shoulder (Primary)  -     MRI Shoulder Left Without Contrast; Future  -     traMADol (ULTRAM) 50 MG tablet; Take 1 tablet by mouth Daily As Needed for Moderate Pain .  Dispense: 20 tablet; Refill: 0    2. Primary osteoarthritis of left shoulder  -     meloxicam (MOBIC) 15 MG tablet; Take 1 tablet by mouth Daily.  Dispense: 90 tablet; Refill: 1    3. Chronic bilateral low back pain without sciatica    I reviewed his symptoms and concerns.  His exam today is highly suspicious for acute tear in rotator cuff.  He has pretty significantly decreased range of motion due to pain.  I was able to access the urgent care records from [place] through [facility], he was evaluated the ER on 1/14/2021.  X-ray is reported as negative, No acute fractures or dislocations. No downsloping of the acromion.  Subacromial space is maintained. No calcific tendinosis. Mild sclerosis of   the greater tuberosity, degenerative.  I will be getting MRI of the left shoulder to evaluate the soft tissue.  He was advised to gently work on his range of motion exercises.  He is already on anti-inflammatories and I advised for him to continue that.  I am also giving him prescription for some tramadol to take as needed for pain.  He was advised at this point to stay off work until his pain somehow improves and the MRI result is available.               No follow-ups on file.    Requested Prescriptions     Signed Prescriptions Disp Refills   • traMADol (ULTRAM) 50 MG tablet 20 tablet 0     Sig: Take 1 tablet by mouth Daily As Needed for Moderate Pain .   •  meloxicam (MOBIC) 15 MG tablet 90 tablet 1     Sig: Take 1 tablet by mouth Daily.

## 2021-01-20 ENCOUNTER — TELEPHONE (OUTPATIENT)
Dept: FAMILY MEDICINE CLINIC | Facility: CLINIC | Age: 56
End: 2021-01-20

## 2021-01-20 NOTE — TELEPHONE ENCOUNTER
I put order for the MRI of the shoulder for him the same day as he was here.  Please check with the  on that.  If any concerns please talk to the .  Thank you.

## 2021-01-20 NOTE — TELEPHONE ENCOUNTER
PT IS CALLING IN STATING THAT HE WAS IN ON Monday TO SEE DR CHRISTINA AND IS CALLING IN TODAY TO SEE IF SHE HAS STARTED ON HIS REFERRAL FOR HIS LEFT SHOULDER. PT IS TO GET A MRI ON IT    PT WANTS TO BE CALLED BACK.    [phone]

## 2021-01-21 ENCOUNTER — TELEPHONE (OUTPATIENT)
Dept: FAMILY MEDICINE CLINIC | Facility: CLINIC | Age: 56
End: 2021-01-21

## 2021-01-21 NOTE — TELEPHONE ENCOUNTER
Melody, please check on the status of the MRI scheduling in update the patient.  Vikki does he want to go back to work?  How is his pain?

## 2021-01-21 NOTE — TELEPHONE ENCOUNTER
PATIENT CALLED NEEDING THE RELEASE FORM FOR WORK.     PATIENT NEEDING THE MRI SCHEDULED FOR SHOULDER    PLEASE ADVISE [phone]

## 2021-01-21 NOTE — TELEPHONE ENCOUNTER
PATIENT CALLED AND STATES HE HAS NOT GOTTEN HIS MRI YET DUE TO INSURANCE APPROVAL.  HE CALLED HIS INSURANCE AND THEY STATE IT SHOULD BE APPROVED LATER TODAY.     HE WAS SEEN 1/18/2021 AND WAS GIVEN A WORK LETTER TO BE OFF WORK FOR A WEEK 1/25/2021  HIS EMPLOYER IS REQUESTING A WORK RELEASE FORM TO STATE HE CAN GO BACK TO WORK ON LIGHT DUTY.    HE CAN  LATER TODAY  CALL BACK NUMBER [phone]

## 2021-01-21 NOTE — TELEPHONE ENCOUNTER
Yes, that sounds good.  May give him a note saying that he is released to go back to work as of 2/1/2020 for light duty.  Thank you.

## 2021-01-21 NOTE — TELEPHONE ENCOUNTER
Okay, thank you for the update.  I can give him a release to go back to work on light duty if he thinks he is ready to do so.  When does he want to go back?

## 2021-01-21 NOTE — TELEPHONE ENCOUNTER
He would like to go back on Feb 1 if that is okay. Will be light duty until he figures out what is going on with his shoulder

## 2021-01-27 ENCOUNTER — APPOINTMENT (OUTPATIENT)
Dept: OTHER | Facility: HOSPITAL | Age: 56
End: 2021-01-27

## 2021-01-27 ENCOUNTER — HOSPITAL ENCOUNTER (OUTPATIENT)
Dept: MRI IMAGING | Facility: HOSPITAL | Age: 56
Discharge: HOME | End: 2021-01-27

## 2021-01-27 DIAGNOSIS — M25.512 ACUTE PAIN OF LEFT SHOULDER: ICD-10-CM

## 2021-01-27 DIAGNOSIS — Z09 FOLLOW UP: ICD-10-CM

## 2021-01-27 DIAGNOSIS — M25.512 ACUTE PAIN OF LEFT SHOULDER: Primary | ICD-10-CM

## 2021-01-27 PROCEDURE — 73221 MRI JOINT UPR EXTREM W/O DYE: CPT

## 2021-02-04 ENCOUNTER — PREP FOR SURGERY (OUTPATIENT)
Dept: OTHER | Facility: HOSPITAL | Age: 56
End: 2021-02-04

## 2021-02-04 ENCOUNTER — OFFICE VISIT (OUTPATIENT)
Dept: ORTHOPEDIC SURGERY | Facility: CLINIC | Age: 56
End: 2021-02-04

## 2021-02-04 VITALS — WEIGHT: 192 LBS | HEIGHT: 69 IN | BODY MASS INDEX: 28.44 KG/M2

## 2021-02-04 DIAGNOSIS — M75.122 COMPLETE TEAR OF LEFT ROTATOR CUFF, UNSPECIFIED WHETHER TRAUMATIC: Primary | ICD-10-CM

## 2021-02-04 DIAGNOSIS — S43.015A ANTERIOR DISLOCATION OF LEFT SHOULDER, INITIAL ENCOUNTER: ICD-10-CM

## 2021-02-04 DIAGNOSIS — S46.012A TRAUMATIC COMPLETE TEAR OF LEFT ROTATOR CUFF, INITIAL ENCOUNTER: Primary | ICD-10-CM

## 2021-02-04 PROCEDURE — 99204 OFFICE O/P NEW MOD 45 MIN: CPT | Performed by: ORTHOPAEDIC SURGERY

## 2021-02-04 RX ORDER — CLINDAMYCIN PHOSPHATE 900 MG/50ML
900 INJECTION, SOLUTION INTRAVENOUS ONCE
Status: CANCELLED | OUTPATIENT
Start: 2021-02-04 | End: 2021-02-04

## 2021-02-04 NOTE — H&P (VIEW-ONLY)
"     Patient ID: Jovany Taveras is a 55 y.o. male.    Chief Complaint:    Chief Complaint   Patient presents with   • Left Shoulder - Consult       HPI:  Jovany is a 55-year-old gentleman here with left shoulder pain in consultation from Dr. Frazier after he fell while skiing several weeks ago.  He heard a pop and developed acute pain in his shoulder.  Pain is a little bit better but he has significant weakness in the shoulder.  Hurts over the front with referral to the deltoid and bicep  Past Medical History:   Diagnosis Date   • ADHD    • Allergic rhinitis    • Chlamydia infection    • Genital warts    • Osteoarthritis    • Vitamin D deficiency        Past Surgical History:   Procedure Laterality Date   • COLONOSCOPY  09/10/2018    polyps removed; 5 years repeat   • TONSILLECTOMY         Family History   Problem Relation Age of Onset   • Hypertension Father    • Osteoarthritis Father    • Osteoporosis Father           Social History     Occupational History   • Not on file   Tobacco Use   • Smoking status: Never Smoker   • Smokeless tobacco: Never Used   Substance and Sexual Activity   • Alcohol use: Yes   • Drug use: No   • Sexual activity: Yes      Review of Systems   Cardiovascular: Negative for chest pain.   Musculoskeletal: Positive for arthralgias.       Objective:    Ht 175.3 cm (69\")   Wt 87.1 kg (192 lb)   BMI 28.35 kg/m²     Physical Examination:  He is a pleasant male in no distress. He is alert and oriented x3 and appears his stated age.  Shoulder demonstrates intact skin with pain over the bicep groove and impingement area.  Passive elevation 80 degrees abduction 20 degrees external rotation 20 degrees internal rotation the left hip.  He has pain weakness on Speed, [place], supraspinatus testing.  Belly press and liftoff are 5/5 and 4/5.  Sensory and motor exam are intact all distributions. Radial pulse is palpable and capillary refill is less than two seconds to all digits    Imaging:  Previous " x-rays demonstrate well-maintained joint spaces, MRI demonstrates anterior-inferior labral tear with a possible small bony component and full-thickness supraspinatus and partial infraspinatus tear    Assessment:  Acute left shoulder full-thickness cuff tear with Bankart tear    Plan:   treatment options discussed.  I recommend left shoulder arthroscopy with rotator cuff repair.  Discussed the rationale for possible conservative versus surgical management of his labrum tear and he would like to proceed withBankart repair  Risks and benefits, specifically risks of bleeding, scar, infection, fracture, stiffness, retear, nerve, tendon, artery damage, the need for further surgery, DVT, and loss of life or limb were discussed. All questions were answered and addressed. Rehabilitation restrictions and timeframes were discussed.        Procedures         Disclaimer: Please note that areas of this note were completed with computer voice recognition software.  Quite often unanticipated grammatical, syntax, homophones, and other interpretive errors are inadvertently transcribed by the computer software. Please excuse any errors that have escaped final proofreading.

## 2021-02-04 NOTE — PROGRESS NOTES
"     Patient ID: Jovany Taveras is a 55 y.o. male.    Chief Complaint:    Chief Complaint   Patient presents with   • Left Shoulder - Consult       HPI:  Jovany is a 55-year-old gentleman here with left shoulder pain in consultation from Dr. Frazier after he fell while skiing several weeks ago.  He heard a pop and developed acute pain in his shoulder.  Pain is a little bit better but he has significant weakness in the shoulder.  Hurts over the front with referral to the deltoid and bicep  Past Medical History:   Diagnosis Date   • ADHD    • Allergic rhinitis    • Chlamydia infection    • Genital warts    • Osteoarthritis    • Vitamin D deficiency        Past Surgical History:   Procedure Laterality Date   • COLONOSCOPY  09/10/2018    polyps removed; 5 years repeat   • TONSILLECTOMY         Family History   Problem Relation Age of Onset   • Hypertension Father    • Osteoarthritis Father    • Osteoporosis Father           Social History     Occupational History   • Not on file   Tobacco Use   • Smoking status: Never Smoker   • Smokeless tobacco: Never Used   Substance and Sexual Activity   • Alcohol use: Yes   • Drug use: No   • Sexual activity: Yes      Review of Systems   Cardiovascular: Negative for chest pain.   Musculoskeletal: Positive for arthralgias.       Objective:    Ht 175.3 cm (69\")   Wt 87.1 kg (192 lb)   BMI 28.35 kg/m²     Physical Examination:  He is a pleasant male in no distress. He is alert and oriented x3 and appears his stated age.  Shoulder demonstrates intact skin with pain over the bicep groove and impingement area.  Passive elevation 80 degrees abduction 20 degrees external rotation 20 degrees internal rotation the left hip.  He has pain weakness on Speed, Wellborn, supraspinatus testing.  Belly press and liftoff are 5/5 and 4/5.  Sensory and motor exam are intact all distributions. Radial pulse is palpable and capillary refill is less than two seconds to all digits    Imaging:  Previous " x-rays demonstrate well-maintained joint spaces, MRI demonstrates anterior-inferior labral tear with a possible small bony component and full-thickness supraspinatus and partial infraspinatus tear    Assessment:  Acute left shoulder full-thickness cuff tear with Bankart tear    Plan:   treatment options discussed.  I recommend left shoulder arthroscopy with rotator cuff repair.  Discussed the rationale for possible conservative versus surgical management of his labrum tear and he would like to proceed withBankart repair  Risks and benefits, specifically risks of bleeding, scar, infection, fracture, stiffness, retear, nerve, tendon, artery damage, the need for further surgery, DVT, and loss of life or limb were discussed. All questions were answered and addressed. Rehabilitation restrictions and timeframes were discussed.        Procedures         Disclaimer: Please note that areas of this note were completed with computer voice recognition software.  Quite often unanticipated grammatical, syntax, homophones, and other interpretive errors are inadvertently transcribed by the computer software. Please excuse any errors that have escaped final proofreading.

## 2021-02-05 DIAGNOSIS — F98.8 ATTENTION DEFICIT DISORDER (ADD) WITHOUT HYPERACTIVITY: ICD-10-CM

## 2021-02-05 PROBLEM — M75.122 COMPLETE TEAR OF LEFT ROTATOR CUFF: Status: ACTIVE | Noted: 2021-02-05

## 2021-02-05 RX ORDER — DEXTROAMPHETAMINE SACCHARATE, AMPHETAMINE ASPARTATE, DEXTROAMPHETAMINE SULFATE AND AMPHETAMINE SULFATE 5; 5; 5; 5 MG/1; MG/1; MG/1; MG/1
1 TABLET ORAL 2 TIMES DAILY
Qty: 60 TABLET | Refills: 0 | Status: SHIPPED | OUTPATIENT
Start: 2021-02-05 | End: 2021-03-04 | Stop reason: SDUPTHER

## 2021-02-05 NOTE — TELEPHONE ENCOUNTER
Caller: Jovany Taveras    Relationship: Self    Best call back number: [phone]    Medication needed:   Requested Prescriptions     Pending Prescriptions Disp Refills   • amphetamine-dextroamphetamine (Adderall) 20 MG tablet 60 tablet 0     Sig: Take 1 tablet by mouth 2 (Two) Times a Day.       When do you need the refill by: 02/05/2021    What details did the patient provide when requesting the medication:     Does the patient have less than a 3 day supply:  [x] Yes  [] No    What is the patient's preferred pharmacy: [facility] #[id number] [address] AT SEC OF [place] [address] & [facility] - [phone]  - [phone] FX

## 2021-02-08 ENCOUNTER — LAB (OUTPATIENT)
Dept: LAB | Facility: HOSPITAL | Age: 56
End: 2021-02-08

## 2021-02-08 ENCOUNTER — HOSPITAL ENCOUNTER (OUTPATIENT)
Dept: CARDIOLOGY | Facility: HOSPITAL | Age: 56
Discharge: HOME | End: 2021-02-08

## 2021-02-08 ENCOUNTER — TELEPHONE (OUTPATIENT)
Dept: ORTHOPEDIC SURGERY | Facility: CLINIC | Age: 56
End: 2021-02-08

## 2021-02-08 DIAGNOSIS — M75.122 COMPLETE TEAR OF LEFT ROTATOR CUFF, UNSPECIFIED WHETHER TRAUMATIC: ICD-10-CM

## 2021-02-08 LAB
APTT PPP: 22.1 SECONDS (ref 24–31)
INR PPP: 0.93 (ref 0.93–1.1)
PROTHROMBIN TIME: 10.3 SECONDS (ref 9.6–11.7)

## 2021-02-08 PROCEDURE — 93010 ELECTROCARDIOGRAM REPORT: CPT | Performed by: INTERNAL MEDICINE

## 2021-02-08 PROCEDURE — 85025 COMPLETE CBC W/AUTO DIFF WBC: CPT

## 2021-02-08 PROCEDURE — 36415 COLL VENOUS BLD VENIPUNCTURE: CPT

## 2021-02-08 PROCEDURE — 93005 ELECTROCARDIOGRAM TRACING: CPT | Performed by: ORTHOPAEDIC SURGERY

## 2021-02-08 PROCEDURE — 80048 BASIC METABOLIC PNL TOTAL CA: CPT

## 2021-02-08 PROCEDURE — 85730 THROMBOPLASTIN TIME PARTIAL: CPT

## 2021-02-08 PROCEDURE — 85610 PROTHROMBIN TIME: CPT

## 2021-02-08 NOTE — TELEPHONE ENCOUNTER
Caller: ALFIE JIM    Relationship: PATIENT-SELF    Best call back number: [phone]    What form or medical record are you requesting: SHORT TERM DISABILITY PAPERS    Who is requesting this form or medical record from you: WORK      Timeframe paperwork needed: ASAP    Additional notes:  PATIENT SAYS PAPERWORK FAXED TO OFFICE  THIS MORNING AND NEEDS DONE ASAP.

## 2021-02-09 LAB
ANION GAP SERPL CALCULATED.3IONS-SCNC: 11 MMOL/L (ref 5–15)
BASOPHILS # BLD AUTO: 0.05 10*3/MM3 (ref 0–0.2)
BASOPHILS NFR BLD AUTO: 0.4 % (ref 0–1.5)
BUN SERPL-MCNC: 18 MG/DL (ref 6–20)
BUN/CREAT SERPL: 20.7 (ref 7–25)
CALCIUM SPEC-SCNC: 9.6 MG/DL (ref 8.6–10.5)
CHLORIDE SERPL-SCNC: 99 MMOL/L (ref 98–107)
CO2 SERPL-SCNC: 29 MMOL/L (ref 22–29)
CREAT SERPL-MCNC: 0.87 MG/DL (ref 0.76–1.27)
DEPRECATED RDW RBC AUTO: 43.3 FL (ref 37–54)
EOSINOPHIL # BLD AUTO: 0.1 10*3/MM3 (ref 0–0.4)
EOSINOPHIL NFR BLD AUTO: 0.9 % (ref 0.3–6.2)
ERYTHROCYTE [DISTWIDTH] IN BLOOD BY AUTOMATED COUNT: 13.3 % (ref 12.3–15.4)
GFR SERPL CREATININE-BSD FRML MDRD: 92 ML/MIN/1.73
GLUCOSE SERPL-MCNC: 82 MG/DL (ref 65–99)
HCT VFR BLD AUTO: 47.8 % (ref 37.5–51)
HGB BLD-MCNC: 15.9 G/DL (ref 13–17.7)
IMM GRANULOCYTES # BLD AUTO: 0.06 10*3/MM3 (ref 0–0.05)
IMM GRANULOCYTES NFR BLD AUTO: 0.5 % (ref 0–0.5)
LYMPHOCYTES # BLD AUTO: 2.77 10*3/MM3 (ref 0.7–3.1)
LYMPHOCYTES NFR BLD AUTO: 24.6 % (ref 19.6–45.3)
MCH RBC QN AUTO: 29.7 PG (ref 26.6–33)
MCHC RBC AUTO-ENTMCNC: 33.3 G/DL (ref 31.5–35.7)
MCV RBC AUTO: 89.3 FL (ref 79–97)
MONOCYTES # BLD AUTO: 0.61 10*3/MM3 (ref 0.1–0.9)
MONOCYTES NFR BLD AUTO: 5.4 % (ref 5–12)
NEUTROPHILS NFR BLD AUTO: 68.2 % (ref 42.7–76)
NEUTROPHILS NFR BLD AUTO: 7.69 10*3/MM3 (ref 1.7–7)
NRBC BLD AUTO-RTO: 0 /100 WBC (ref 0–0.2)
PLATELET # BLD AUTO: 303 10*3/MM3 (ref 140–450)
PMV BLD AUTO: 10.4 FL (ref 6–12)
POTASSIUM SERPL-SCNC: 4.4 MMOL/L (ref 3.5–5.2)
RBC # BLD AUTO: 5.35 10*6/MM3 (ref 4.14–5.8)
SODIUM SERPL-SCNC: 139 MMOL/L (ref 136–145)
WBC # BLD AUTO: 11.28 10*3/MM3 (ref 3.4–10.8)

## 2021-02-09 NOTE — TELEPHONE ENCOUNTER
lvm for patient letting him know that they just need to be signed and that I would fax them as soon as they were.

## 2021-02-13 LAB — QT INTERVAL: 394 MS

## 2021-02-17 ENCOUNTER — LAB (OUTPATIENT)
Dept: LAB | Facility: HOSPITAL | Age: 56
End: 2021-02-17

## 2021-02-17 DIAGNOSIS — M75.122 COMPLETE TEAR OF LEFT ROTATOR CUFF, UNSPECIFIED WHETHER TRAUMATIC: ICD-10-CM

## 2021-02-17 LAB — SARS-COV-2 ORF1AB RESP QL NAA+PROBE: NOT DETECTED

## 2021-02-17 PROCEDURE — C9803 HOPD COVID-19 SPEC COLLECT: HCPCS

## 2021-02-17 PROCEDURE — U0004 COV-19 TEST NON-CDC HGH THRU: HCPCS

## 2021-02-18 RX ORDER — PROMETHAZINE HYDROCHLORIDE 12.5 MG/1
12.5 TABLET ORAL EVERY 6 HOURS PRN
Qty: 21 TABLET | Refills: 1 | Status: SHIPPED | OUTPATIENT
Start: 2021-02-18 | End: 2021-03-22

## 2021-02-18 RX ORDER — OXYCODONE AND ACETAMINOPHEN 10; 325 MG/1; MG/1
1 TABLET ORAL EVERY 6 HOURS PRN
Qty: 28 TABLET | Refills: 0 | Status: SHIPPED | OUTPATIENT
Start: 2021-02-18 | End: 2021-03-22

## 2021-02-18 RX ORDER — CLINDAMYCIN HYDROCHLORIDE 300 MG/1
600 CAPSULE ORAL 3 TIMES DAILY
Qty: 6 CAPSULE | Refills: 0 | Status: SHIPPED | OUTPATIENT
Start: 2021-02-18 | End: 2021-02-19

## 2021-02-18 RX ORDER — NAPROXEN 500 MG/1
500 TABLET ORAL 2 TIMES DAILY WITH MEALS
Qty: 28 TABLET | Refills: 0 | Status: SHIPPED | OUTPATIENT
Start: 2021-02-18 | End: 2021-03-22

## 2021-02-19 ENCOUNTER — HOSPITAL ENCOUNTER (OUTPATIENT)
Facility: HOSPITAL | Age: 56
Setting detail: HOSPITAL OUTPATIENT SURGERY
Discharge: HOME | End: 2021-02-19
Attending: ORTHOPAEDIC SURGERY | Admitting: ORTHOPAEDIC SURGERY

## 2021-02-19 ENCOUNTER — ANESTHESIA (OUTPATIENT)
Dept: PERIOP | Facility: HOSPITAL | Age: 56
End: 2021-02-19

## 2021-02-19 ENCOUNTER — ANESTHESIA EVENT (OUTPATIENT)
Dept: PERIOP | Facility: HOSPITAL | Age: 56
End: 2021-02-19

## 2021-02-19 VITALS
BODY MASS INDEX: 27.4 KG/M2 | WEIGHT: 185 LBS | SYSTOLIC BLOOD PRESSURE: 128 MMHG | DIASTOLIC BLOOD PRESSURE: 75 MMHG | RESPIRATION RATE: 16 BRPM | HEIGHT: 69 IN | TEMPERATURE: 96 F | HEART RATE: 54 BPM | OXYGEN SATURATION: 96 %

## 2021-02-19 DIAGNOSIS — M75.122 COMPLETE TEAR OF LEFT ROTATOR CUFF, UNSPECIFIED WHETHER TRAUMATIC: Primary | ICD-10-CM

## 2021-02-19 PROCEDURE — 25010000002 ROPIVACAINE PER 1 MG: Performed by: ANESTHESIOLOGY

## 2021-02-19 PROCEDURE — 25010000002 MIDAZOLAM PER 1 MG: Performed by: ANESTHESIOLOGY

## 2021-02-19 PROCEDURE — 25010000002 FENTANYL CITRATE (PF) 100 MCG/2ML SOLUTION: Performed by: ANESTHESIOLOGY

## 2021-02-19 PROCEDURE — C1889 IMPLANT/INSERT DEVICE, NOC: HCPCS | Performed by: ORTHOPAEDIC SURGERY

## 2021-02-19 PROCEDURE — 25010000002 DEXAMETHASONE PER 1 MG: Performed by: ANESTHESIOLOGY

## 2021-02-19 PROCEDURE — C1713 ANCHOR/SCREW BN/BN,TIS/BN: HCPCS | Performed by: ORTHOPAEDIC SURGERY

## 2021-02-19 PROCEDURE — 29806 SHO ARTHRS SRG CAPSULORRAPHY: CPT | Performed by: ORTHOPAEDIC SURGERY

## 2021-02-19 PROCEDURE — 29827 SHO ARTHRS SRG RT8TR CUF RPR: CPT | Performed by: ORTHOPAEDIC SURGERY

## 2021-02-19 PROCEDURE — 25010000002 EPINEPHRINE PER 0.1 MG: Performed by: ORTHOPAEDIC SURGERY

## 2021-02-19 PROCEDURE — 25010000002 DEXAMETHASONE PER 1 MG: Performed by: ANESTHESIOLOGIST ASSISTANT

## 2021-02-19 PROCEDURE — 25010000002 PROPOFOL 10 MG/ML EMULSION: Performed by: ANESTHESIOLOGIST ASSISTANT

## 2021-02-19 PROCEDURE — 25010000002 ONDANSETRON PER 1 MG: Performed by: ANESTHESIOLOGIST ASSISTANT

## 2021-02-19 PROCEDURE — 25010000003 LIDOCAINE 1 % SOLUTION 20 ML VIAL: Performed by: ORTHOPAEDIC SURGERY

## 2021-02-19 PROCEDURE — 76942 ECHO GUIDE FOR BIOPSY: CPT | Performed by: ORTHOPAEDIC SURGERY

## 2021-02-19 PROCEDURE — 25010000002 KETOROLAC TROMETHAMINE PER 15 MG: Performed by: ORTHOPAEDIC SURGERY

## 2021-02-19 DEVICE — HI-FI® PASSING LOOP
Type: IMPLANTABLE DEVICE | Site: SHOULDER | Status: FUNCTIONAL
Brand: HI-FI

## 2021-02-19 DEVICE — SUT/ANCH BIOCOMP PUSH/LK 2.9X12.5MM: Type: IMPLANTABLE DEVICE | Site: SHOULDER | Status: FUNCTIONAL

## 2021-02-19 DEVICE — CROSSFT KNOTLESS BIOCOMPOSITE 4.75 MM SUTURE ANCHOR WITH ONE 2 MM HI-FI TAPE (BLUE)
Type: IMPLANTABLE DEVICE | Site: SHOULDER | Status: FUNCTIONAL
Brand: CROSSFT, HI-FI

## 2021-02-19 DEVICE — CROSSFT KNOTLESS BIOCOMPOSITE 5.5 MM SUTURE ANCHOR
Type: IMPLANTABLE DEVICE | Site: SHOULDER | Status: FUNCTIONAL
Brand: CROSSFT

## 2021-02-19 DEVICE — SUT FW 2/0 38IN 1BLU 1BLK/WHT  AR7201: Type: IMPLANTABLE DEVICE | Site: SHOULDER | Status: FUNCTIONAL

## 2021-02-19 DEVICE — CROSSFT KNOTLESS BIOCOMPOSITE 4.75 MM SUTURE ANCHOR
Type: IMPLANTABLE DEVICE | Site: SHOULDER | Status: FUNCTIONAL
Brand: CROSSFT

## 2021-02-19 DEVICE — CROSSFT KNOTLESS BIOCOMPOSITE 4.75 MM SUTURE ANCHOR WITH ONE 2 MM HI-FI TAPE (WHITE/BLACK)
Type: IMPLANTABLE DEVICE | Site: SHOULDER | Status: FUNCTIONAL
Brand: CROSSFT, HI-FI

## 2021-02-19 RX ORDER — SODIUM CHLORIDE 0.9 % (FLUSH) 0.9 %
10 SYRINGE (ML) INJECTION AS NEEDED
Status: DISCONTINUED | OUTPATIENT
Start: 2021-02-19 | End: 2021-02-19 | Stop reason: HOSPADM

## 2021-02-19 RX ORDER — PROPOFOL 10 MG/ML
VIAL (ML) INTRAVENOUS AS NEEDED
Status: DISCONTINUED | OUTPATIENT
Start: 2021-02-19 | End: 2021-02-19 | Stop reason: SURG

## 2021-02-19 RX ORDER — DEXAMETHASONE SODIUM PHOSPHATE 4 MG/ML
INJECTION, SOLUTION INTRA-ARTICULAR; INTRALESIONAL; INTRAMUSCULAR; INTRAVENOUS; SOFT TISSUE AS NEEDED
Status: DISCONTINUED | OUTPATIENT
Start: 2021-02-19 | End: 2021-02-19 | Stop reason: SURG

## 2021-02-19 RX ORDER — PHENYLEPHRINE HCL IN 0.9% NACL 0.5 MG/5ML
SYRINGE (ML) INTRAVENOUS AS NEEDED
Status: DISCONTINUED | OUTPATIENT
Start: 2021-02-19 | End: 2021-02-19 | Stop reason: SURG

## 2021-02-19 RX ORDER — ACETAMINOPHEN 325 MG/1
650 TABLET ORAL ONCE AS NEEDED
Status: DISCONTINUED | OUTPATIENT
Start: 2021-02-19 | End: 2021-02-19 | Stop reason: HOSPADM

## 2021-02-19 RX ORDER — MIDAZOLAM HYDROCHLORIDE 1 MG/ML
INJECTION INTRAMUSCULAR; INTRAVENOUS
Status: COMPLETED | OUTPATIENT
Start: 2021-02-19 | End: 2021-02-19

## 2021-02-19 RX ORDER — ACETAMINOPHEN 650 MG/1
650 SUPPOSITORY RECTAL ONCE AS NEEDED
Status: DISCONTINUED | OUTPATIENT
Start: 2021-02-19 | End: 2021-02-19 | Stop reason: HOSPADM

## 2021-02-19 RX ORDER — ONDANSETRON 2 MG/ML
4 INJECTION INTRAMUSCULAR; INTRAVENOUS ONCE AS NEEDED
Status: DISCONTINUED | OUTPATIENT
Start: 2021-02-19 | End: 2021-02-19 | Stop reason: HOSPADM

## 2021-02-19 RX ORDER — EPHEDRINE SULFATE/0.9% NACL/PF 25 MG/5 ML
SYRINGE (ML) INTRAVENOUS AS NEEDED
Status: DISCONTINUED | OUTPATIENT
Start: 2021-02-19 | End: 2021-02-19 | Stop reason: SURG

## 2021-02-19 RX ORDER — NALOXONE HCL 0.4 MG/ML
0.4 VIAL (ML) INJECTION AS NEEDED
Status: DISCONTINUED | OUTPATIENT
Start: 2021-02-19 | End: 2021-02-19 | Stop reason: HOSPADM

## 2021-02-19 RX ORDER — DIPHENHYDRAMINE HYDROCHLORIDE 50 MG/ML
12.5 INJECTION INTRAMUSCULAR; INTRAVENOUS
Status: DISCONTINUED | OUTPATIENT
Start: 2021-02-19 | End: 2021-02-19 | Stop reason: HOSPADM

## 2021-02-19 RX ORDER — HYDROCODONE BITARTRATE AND ACETAMINOPHEN 5; 325 MG/1; MG/1
1 TABLET ORAL ONCE AS NEEDED
Status: DISCONTINUED | OUTPATIENT
Start: 2021-02-19 | End: 2021-02-19 | Stop reason: HOSPADM

## 2021-02-19 RX ORDER — IPRATROPIUM BROMIDE AND ALBUTEROL SULFATE 2.5; .5 MG/3ML; MG/3ML
3 SOLUTION RESPIRATORY (INHALATION) ONCE AS NEEDED
Status: DISCONTINUED | OUTPATIENT
Start: 2021-02-19 | End: 2021-02-19 | Stop reason: HOSPADM

## 2021-02-19 RX ORDER — ROPIVACAINE HYDROCHLORIDE 5 MG/ML
INJECTION, SOLUTION EPIDURAL; INFILTRATION; PERINEURAL
Status: COMPLETED | OUTPATIENT
Start: 2021-02-19 | End: 2021-02-19

## 2021-02-19 RX ORDER — CLINDAMYCIN PHOSPHATE 900 MG/50ML
900 INJECTION, SOLUTION INTRAVENOUS ONCE
Status: COMPLETED | OUTPATIENT
Start: 2021-02-19 | End: 2021-02-19

## 2021-02-19 RX ORDER — SODIUM CHLORIDE, SODIUM LACTATE, POTASSIUM CHLORIDE, CALCIUM CHLORIDE 600; 310; 30; 20 MG/100ML; MG/100ML; MG/100ML; MG/100ML
1000 INJECTION, SOLUTION INTRAVENOUS CONTINUOUS
Status: DISCONTINUED | OUTPATIENT
Start: 2021-02-19 | End: 2021-02-19 | Stop reason: HOSPADM

## 2021-02-19 RX ORDER — FLUMAZENIL 0.1 MG/ML
0.2 INJECTION INTRAVENOUS AS NEEDED
Status: DISCONTINUED | OUTPATIENT
Start: 2021-02-19 | End: 2021-02-19 | Stop reason: HOSPADM

## 2021-02-19 RX ORDER — FENTANYL CITRATE 50 UG/ML
25 INJECTION, SOLUTION INTRAMUSCULAR; INTRAVENOUS
Status: DISCONTINUED | OUTPATIENT
Start: 2021-02-19 | End: 2021-02-19 | Stop reason: HOSPADM

## 2021-02-19 RX ORDER — MIDAZOLAM HYDROCHLORIDE 1 MG/ML
1 INJECTION INTRAMUSCULAR; INTRAVENOUS
Status: DISCONTINUED | OUTPATIENT
Start: 2021-02-19 | End: 2021-02-19 | Stop reason: HOSPADM

## 2021-02-19 RX ORDER — LIDOCAINE HYDROCHLORIDE 10 MG/ML
0.5 INJECTION, SOLUTION INFILTRATION; PERINEURAL ONCE AS NEEDED
Status: DISCONTINUED | OUTPATIENT
Start: 2021-02-19 | End: 2021-02-19 | Stop reason: HOSPADM

## 2021-02-19 RX ORDER — FENTANYL CITRATE 50 UG/ML
INJECTION, SOLUTION INTRAMUSCULAR; INTRAVENOUS
Status: COMPLETED | OUTPATIENT
Start: 2021-02-19 | End: 2021-02-19

## 2021-02-19 RX ORDER — ONDANSETRON 2 MG/ML
INJECTION INTRAMUSCULAR; INTRAVENOUS AS NEEDED
Status: DISCONTINUED | OUTPATIENT
Start: 2021-02-19 | End: 2021-02-19 | Stop reason: SURG

## 2021-02-19 RX ORDER — ROCURONIUM BROMIDE 10 MG/ML
INJECTION, SOLUTION INTRAVENOUS AS NEEDED
Status: DISCONTINUED | OUTPATIENT
Start: 2021-02-19 | End: 2021-02-19 | Stop reason: SURG

## 2021-02-19 RX ORDER — DEXAMETHASONE SODIUM PHOSPHATE 4 MG/ML
INJECTION, SOLUTION INTRA-ARTICULAR; INTRALESIONAL; INTRAMUSCULAR; INTRAVENOUS; SOFT TISSUE
Status: COMPLETED | OUTPATIENT
Start: 2021-02-19 | End: 2021-02-19

## 2021-02-19 RX ORDER — LIDOCAINE HYDROCHLORIDE 10 MG/ML
INJECTION, SOLUTION EPIDURAL; INFILTRATION; INTRACAUDAL; PERINEURAL AS NEEDED
Status: DISCONTINUED | OUTPATIENT
Start: 2021-02-19 | End: 2021-02-19 | Stop reason: SURG

## 2021-02-19 RX ORDER — HYDROMORPHONE HCL 110MG/55ML
0.25 PATIENT CONTROLLED ANALGESIA SYRINGE INTRAVENOUS
Status: DISCONTINUED | OUTPATIENT
Start: 2021-02-19 | End: 2021-02-19 | Stop reason: HOSPADM

## 2021-02-19 RX ORDER — GLYCOPYRROLATE 1 MG/5 ML
SYRINGE (ML) INTRAVENOUS AS NEEDED
Status: DISCONTINUED | OUTPATIENT
Start: 2021-02-19 | End: 2021-02-19 | Stop reason: SURG

## 2021-02-19 RX ORDER — NEOSTIGMINE METHYLSULFATE 5 MG/5 ML
SYRINGE (ML) INTRAVENOUS AS NEEDED
Status: DISCONTINUED | OUTPATIENT
Start: 2021-02-19 | End: 2021-02-19 | Stop reason: SURG

## 2021-02-19 RX ADMIN — MIDAZOLAM 2 MG: 1 INJECTION INTRAMUSCULAR; INTRAVENOUS at 07:44

## 2021-02-19 RX ADMIN — Medication 0.2 MG: at 09:46

## 2021-02-19 RX ADMIN — DEXAMETHASONE SODIUM PHOSPHATE 4 MG: 4 INJECTION, SOLUTION INTRAMUSCULAR; INTRAVENOUS at 09:36

## 2021-02-19 RX ADMIN — PROPOFOL 200 MG: 10 INJECTION, EMULSION INTRAVENOUS at 09:22

## 2021-02-19 RX ADMIN — LIDOCAINE HYDROCHLORIDE 50 MG: 10 INJECTION, SOLUTION EPIDURAL; INFILTRATION; INTRACAUDAL; PERINEURAL at 09:22

## 2021-02-19 RX ADMIN — Medication 4 MG: at 11:00

## 2021-02-19 RX ADMIN — FENTANYL CITRATE 100 MCG: 50 INJECTION, SOLUTION INTRAMUSCULAR; INTRAVENOUS at 07:44

## 2021-02-19 RX ADMIN — ROCURONIUM BROMIDE 50 MG: 10 INJECTION, SOLUTION INTRAVENOUS at 09:22

## 2021-02-19 RX ADMIN — Medication 5 MG: at 10:18

## 2021-02-19 RX ADMIN — DEXAMETHASONE SODIUM PHOSPHATE 4 MG: 4 INJECTION, SOLUTION INTRAMUSCULAR; INTRAVENOUS at 07:44

## 2021-02-19 RX ADMIN — ROPIVACAINE HYDROCHLORIDE 30 ML: 5 INJECTION, SOLUTION EPIDURAL; INFILTRATION; PERINEURAL at 07:44

## 2021-02-19 RX ADMIN — PHENYLEPHRINE HYDROCHLORIDE 200 MCG: 10 INJECTION INTRAVENOUS at 10:40

## 2021-02-19 RX ADMIN — CLINDAMYCIN PHOSPHATE 900 MG: 900 INJECTION, SOLUTION INTRAVENOUS at 09:27

## 2021-02-19 RX ADMIN — PHENYLEPHRINE HYDROCHLORIDE 100 MCG: 10 INJECTION INTRAVENOUS at 09:40

## 2021-02-19 RX ADMIN — PHENYLEPHRINE HYDROCHLORIDE 200 MCG: 10 INJECTION INTRAVENOUS at 10:16

## 2021-02-19 RX ADMIN — ONDANSETRON 4 MG: 2 INJECTION INTRAMUSCULAR; INTRAVENOUS at 10:48

## 2021-02-19 RX ADMIN — PHENYLEPHRINE HYDROCHLORIDE 200 MCG: 10 INJECTION INTRAVENOUS at 09:54

## 2021-02-19 RX ADMIN — PHENYLEPHRINE HYDROCHLORIDE 200 MCG: 10 INJECTION INTRAVENOUS at 09:46

## 2021-02-19 RX ADMIN — PHENYLEPHRINE HYDROCHLORIDE 100 MCG: 10 INJECTION INTRAVENOUS at 10:31

## 2021-02-19 RX ADMIN — PHENYLEPHRINE HYDROCHLORIDE 100 MCG: 10 INJECTION INTRAVENOUS at 09:33

## 2021-02-19 RX ADMIN — Medication 5 MG: at 10:31

## 2021-02-19 RX ADMIN — PHENYLEPHRINE HYDROCHLORIDE 200 MCG: 10 INJECTION INTRAVENOUS at 10:01

## 2021-02-19 RX ADMIN — SODIUM CHLORIDE, SODIUM LACTATE, POTASSIUM CHLORIDE, AND CALCIUM CHLORIDE: .6; .31; .03; .02 INJECTION, SOLUTION INTRAVENOUS at 09:17

## 2021-02-19 RX ADMIN — Medication 5 MG: at 10:40

## 2021-02-19 RX ADMIN — Medication 0.6 MG: at 11:00

## 2021-02-19 NOTE — ANESTHESIA PROCEDURE NOTES
Peripheral Block      Patient reassessed immediately prior to procedure    Patient location during procedure: pre-op  Reason for block: procedure for pain, at surgeon's request and post-op pain management  Performed by  Anesthesiologist: Sean Cruz MD  Preanesthetic Checklist  Completed: patient identified, site marked, surgical consent, pre-op evaluation, timeout performed, IV checked, risks and benefits discussed and monitors and equipment checked  Prep:  Pt Position: supine  Sterile barriers:gloves, sterile barriers, mask and cap  Prep: ChloraPrep  Patient monitoring: blood pressure monitoring, continuous pulse oximetry and EKG  Procedure  Sedation:yes  Performed under: PNB  Guidance:ultrasound guided  ULTRASOUND INTERPRETATION.  Using ultrasound guidance a 22 G and 20 G gauge needle was placed in close proximity to the brachial plexus nerve, at which point, under ultrasound guidance anesthetic was injected in the area of the nerve and spread of the anesthesia was seen on ultrasound in close proximity thereto.  There were no abnormalities seen on ultrasound; a digital image was taken; and the patient tolerated the procedure with no complications. Images:still images obtained, printed/placed on chart    Laterality:left  Block Type:interscalene  Injection Technique:single-shot  Needle Type:echogenic  Needle Gauge:20 G  Resistance on Injection: none  Sedation medications used: midazolam (VERSED) injection, 2 mg  fentaNYL citrate (PF) (SUBLIMAZE) injection, 100 mcg  Medications Used: dexamethasone (DECADRON) injection, 4 mg  ropivacaine (NAROPIN) 0.5 % injection, 30 mL      Post Assessment  Injection Assessment: negative aspiration for heme, no paresthesia on injection and incremental injection  Patient Tolerance:comfortable throughout block  Complications:no  Additional Notes  25 ml of 0.5% Ropivicaine plus Decadron 4 ml. No problem with block Block placed without problems

## 2021-02-19 NOTE — ANESTHESIA PROCEDURE NOTES
Airway  Urgency: elective    Date/Time: 2/19/2021 9:25 AM  Airway not difficult    General Information and Staff    Patient location during procedure: OR  Anesthesiologist: Sean Cruz MD  CRNA: Chidi Carias AA    Indications and Patient Condition  Indications for airway management: airway protection    Preoxygenated: yes  MILS maintained throughout  Mask difficulty assessment: 1 - vent by mask    Final Airway Details  Final airway type: endotracheal airway      Successful airway: ETT  Cuffed: yes   Successful intubation technique: direct laryngoscopy  Endotracheal tube insertion site: oral  Blade: Geo  Blade size: 4  ETT size (mm): 7.5  Cormack-Lehane Classification: grade I - full view of glottis  Placement verified by: chest auscultation and capnometry   Cuff volume (mL): 10  Measured from: lips  ETT/EBT  to lips (cm): 23  Number of attempts at approach: 1  Assessment: lips, teeth, and gum same as pre-op and atraumatic intubation

## 2021-02-19 NOTE — ANESTHESIA PREPROCEDURE EVALUATION
Anesthesia Evaluation     Patient summary reviewed and Nursing notes reviewed   NPO Solid Status: > 8 hours  NPO Liquid Status: > 8 hours           Airway   Mallampati: II  TM distance: >3 FB  Neck ROM: full  No difficulty expected  Dental - normal exam     Pulmonary - negative pulmonary ROS and normal exam    breath sounds clear to auscultation  Cardiovascular - negative cardio ROS and normal exam  Exercise tolerance: unable to assess    ECG reviewed  Rhythm: regular  Rate: normal        Neuro/Psych  (+) psychiatric history,     GI/Hepatic/Renal/Endo - negative ROS     Musculoskeletal     Abdominal  - normal exam   Substance History - negative use     OB/GYN negative ob/gyn ROS         Other   arthritis,                      Anesthesia Plan    ASA 2     general with block     intravenous induction     Anesthetic plan, all risks, benefits, and alternatives have been provided, discussed and informed consent has been obtained with: patient.

## 2021-02-19 NOTE — ANESTHESIA POSTPROCEDURE EVALUATION
Patient: Jovany Taveras    Procedure Summary     Date: 02/19/21 Room / Location: [facility] OR [address] [place] [address] MAIN OR    Anesthesia Start: 0917 Anesthesia Stop: 1114    Procedure: SHOULDER ARTHROSCOPY, BANKART REPAIR AND ROTATOR CUFF REPAIR (Left Shoulder) Diagnosis:       Complete tear of left rotator cuff, unspecified whether traumatic      (Complete tear of left rotator cuff, unspecified whether traumatic [M75.122])    Surgeon: Wan Mccormick MD Provider: Sean Cruz MD    Anesthesia Type: general with block ASA Status: 2          Anesthesia Type: general with block    Vitals  Vitals Value Taken Time   /72 02/19/21 1211   Temp 97.7 °F (36.5 °C) 02/19/21 1211   Pulse 55 02/19/21 1212   Resp 14 02/19/21 1211   SpO2 94 % 02/19/21 1212   Vitals shown include unvalidated device data.        Post Anesthesia Care and Evaluation    Patient location during evaluation: PACU  Patient participation: complete - patient participated  Level of consciousness: awake  Pain scale: See nurse's notes for pain score.  Pain management: adequate  Airway patency: patent  Anesthetic complications: No anesthetic complications  PONV Status: none  Cardiovascular status: acceptable  Respiratory status: acceptable  Hydration status: acceptable    Comments: Patient seen and examined postoperatively; vital signs stable; SpO2 greater than or equal to 90%; cardiopulmonary status stable; nausea/vomiting adequately controlled; pain adequately controlled; no apparent anesthesia complications; patient discharged from anesthesia care when discharge criteria were met

## 2021-02-22 ENCOUNTER — OFFICE VISIT (OUTPATIENT)
Dept: ORTHOPEDIC SURGERY | Facility: CLINIC | Age: 56
End: 2021-02-22

## 2021-02-22 VITALS
HEIGHT: 69 IN | DIASTOLIC BLOOD PRESSURE: 84 MMHG | HEART RATE: 64 BPM | BODY MASS INDEX: 27.4 KG/M2 | SYSTOLIC BLOOD PRESSURE: 127 MMHG | WEIGHT: 185 LBS

## 2021-02-22 DIAGNOSIS — Z47.89 ORTHOPEDIC AFTERCARE: Primary | ICD-10-CM

## 2021-02-22 DIAGNOSIS — M75.122 COMPLETE TEAR OF LEFT ROTATOR CUFF, UNSPECIFIED WHETHER TRAUMATIC: ICD-10-CM

## 2021-02-22 PROCEDURE — 99024 POSTOP FOLLOW-UP VISIT: CPT | Performed by: ORTHOPAEDIC SURGERY

## 2021-02-22 NOTE — PROGRESS NOTES
Patient ID: Jovany Taveras is a 55 y.o. male.  2/19/21 left arthroscopy supraspinatus and Bankart repair  Pain moderate    Objective:    There were no vitals taken for this visit.    Physical Examination:  Incision is well approximated out infection  Sensory and motor exam are intact all distributions. Radial pulse is palpable and capillary refill is less than two seconds to all digits       Imaging:      Assessment:  Doing well after cuff and Bankart repair    Plan:  Wounds were cleaned and redressed. Restrictions discussed.  Begin therapy and see me in 4 weeks.  Remove dressings in two weeks    Procedures

## 2021-02-22 NOTE — PATIENT INSTRUCTIONS
Shoulder Arthroscopy: Post- Operative Visit Objectives    1) Review the operative findings, procedures and photos.  2) Make sure medications are effective and not causing problems.  a) Pain: Oxycodone or hydrocodone is for pain. You may take 1 tablet every 6 hours as necessary.  Some patients don’t require the use of these…in those circumstances just use Tylenol extra-strength 1 or 2 tablets every 4-6 hours.   b) Naproxen 500 mg. For pain and inflammation.  You should take 1 every twice a day.  Do not use Aleve, Ibuprofen, Motrin or Advil during this time.  If you have had any problems stop taking these medicines and please tell us!  c) Keflex (cephalexin). This is an antibiotic to be taken as a preventive medicine.  Take 1 pill every 6 hours for 1 day. If you have a penicillin allergy this will be replaced by other options.  3) Wound Care:  a) Today we will change your dressings and cover your wounds with a plastic covering called Tegaderm. This will allow you to shower immediately.  Remove the tegaderm and underlying dressings in two weeks then ok to get wet in a shower. No Baths or swimming until 4 weeks after surgery.  Keep a towel on the dressing while applying ice.  4) Exercises and Physical Therapy Remember the protocol is 3 phases:  a) Healing (6 wks)  Continue the use of the sling for 6 weeks; depending on procedure utilized this may be shorter. You can do gentle range of motion exercise of the elbow and wrist immediately with the arm at the side.  Formal physical therapy will usually start in two weeks.    b) Rehabilitative (6 wks) You begin a more aggressive phase of physical therapy at the 6 week ricardo. Light strengthening and a continued emphasis on protecting the repair are important at this stage.  c) Restorative (4 wks)  Back to your sports and job activities gradually   5) Follow Up appointments Schedule Follow up visits as directed usually in 4 weeks. Physical therapy will be ordered today and you  should receive a phone call or can schedule yourself if appropriate.  6) Notes  Make sure you have all necessary notes and documentation for school or work.  7) Issues: Remember our goal is to make this process smooth and easy if there is any thing you need please ask us or call back [phone]  8)

## 2021-03-04 DIAGNOSIS — F98.8 ATTENTION DEFICIT DISORDER (ADD) WITHOUT HYPERACTIVITY: ICD-10-CM

## 2021-03-04 RX ORDER — DEXTROAMPHETAMINE SACCHARATE, AMPHETAMINE ASPARTATE, DEXTROAMPHETAMINE SULFATE AND AMPHETAMINE SULFATE 5; 5; 5; 5 MG/1; MG/1; MG/1; MG/1
1 TABLET ORAL 2 TIMES DAILY
Qty: 60 TABLET | Refills: 0 | Status: CANCELLED | OUTPATIENT
Start: 2021-03-04

## 2021-03-04 RX ORDER — DEXTROAMPHETAMINE SACCHARATE, AMPHETAMINE ASPARTATE, DEXTROAMPHETAMINE SULFATE AND AMPHETAMINE SULFATE 5; 5; 5; 5 MG/1; MG/1; MG/1; MG/1
1 TABLET ORAL 2 TIMES DAILY
Qty: 60 TABLET | Refills: 0 | Status: SHIPPED | OUTPATIENT
Start: 2021-03-04 | End: 2021-04-05 | Stop reason: SDUPTHER

## 2021-03-04 NOTE — TELEPHONE ENCOUNTER
Caller: Jovany Taveras    Relationship: Self    Best call back number: [phone]     Medication needed:   Requested Prescriptions     Pending Prescriptions Disp Refills   • amphetamine-dextroamphetamine (Adderall) 20 MG tablet 60 tablet 0     Sig: Take 1 tablet by mouth 2 (Two) Times a Day.       When do you need the refill by: 03/04/2021    What details did the patient provide when requesting the medication: PATIENT STATED HE IS NEEDING A NEW PRESCRIPTION SENT TO THE PHARMACY    Does the patient have less than a 3 day supply:  [] Yes  [x] No    What is the patient's preferred pharmacy: [facility] #[id number] [address] AT SEC OF [place] [address] & [facility] [address]  - [phone] [facility] [phone] FX

## 2021-03-05 ENCOUNTER — TREATMENT (OUTPATIENT)
Dept: PHYSICAL THERAPY | Facility: CLINIC | Age: 56
End: 2021-03-05

## 2021-03-05 DIAGNOSIS — Z47.89 ORTHOPEDIC AFTERCARE: Primary | ICD-10-CM

## 2021-03-05 DIAGNOSIS — M75.122 COMPLETE TEAR OF LEFT ROTATOR CUFF, UNSPECIFIED WHETHER TRAUMATIC: ICD-10-CM

## 2021-03-05 PROCEDURE — 97161 PT EVAL LOW COMPLEX 20 MIN: CPT | Performed by: PHYSICAL THERAPIST

## 2021-03-05 PROCEDURE — 97110 THERAPEUTIC EXERCISES: CPT | Performed by: PHYSICAL THERAPIST

## 2021-03-05 NOTE — PROGRESS NOTES
Physical Therapy Initial Evaluation and Plan of Care    Patient: Jovany Taveras   : 1965  Diagnosis/ICD-10 Code:  Orthopedic aftercare [Z47.89]  Referring practitioner: Wan Mccormick, *  Date of Initial Visit: 3/5/2021  Today's Date: 3/5/2021  Patient seen for 1 sessions            Subjective Questionnaire: QuickDASH: 72.7% disability  Subjective Questionnaire: QuickDASH (Work - ):100% disability  Subjective Questionnaire: QuickDASH (Sport - Volleyball) 100% disability    Subjective Evaluation    History of Present Illness  Date of onset: 2021  Date of surgery: 2021  Mechanism of injury: 55 male who was skiing on 2021 when he fell and felt an immediate pop in his left shoulder.  He was taken to ED and was given x-ray.  He continued to have pain and was unable to use his left arm so he sought care with his PCP who referred for ortho consult.  He was sent for MRI and found to anterior-inferior labral tear with possible rotator cuff tears.    He subsequently had left arthroscopy supraspinatus and Bankart repair on 21.  Since then he has been in sling.  He reports he can barely move his arm and is in significant pain and cannot sleep for more than a few hours due to pain.      Patient Occupation: [employer]   Precautions and Work Restrictions: 21 left arthroscopy supraspinatus and Bankart repair - follow protocolPain  Current pain ratin  At best pain ratin  At worst pain ratin  Location: (L) shoulder  Quality: deep pressure with occasional sharp pains.  Exacerbated by: difficulty sleeping in static position,   Progression: no change    Hand dominance: right    Treatments  Current treatment: physical therapy  Patient Goals  Patient goals for therapy: decreased pain, decreased edema, increased motion, increased strength, independence with ADLs/IADLs, return to work and return to sport/leisure activities                  Objective          Postural  Observations  Seated posture: poor  Standing posture: fair  Correction of posture: has no consistent effect    Additional Postural Observation Details  Keeps shoulders very protracted in guarded position    Observations     Additional Shoulder Observation Details  Wears sling with abduction pillow.  Very protective of shoulder and keeps his left arm tight to side.    Cervical/Thoracic Screen   Cervical range of motion within normal limits    Neurological Testing     Sensation     Shoulder   Left Shoulder   Intact: light touch    Right Shoulder   Intact: light touch    Active Range of Motion     Additional Active Range of Motion Details  AROM not tested    Passive Range of Motion   Left Shoulder   Flexion: 70 degrees with pain  Abduction: 45 degrees with pain     General Comments     Shoulder Comments   Was able to perform very little objective testing due to patient's pain and guarding.  Initially, he had a lot of apprehension with passive motion of the shoulder, but after some gentle PROM, he did better and was able to perform some AAROM with dowel.         Today's treatment:  Issued, instructed in & performed home exercise program below:     Access Code: [email or web address]   URL: [email or web address]   Date: 03/05/2021   Prepared by: Jonathan Redding     Exercises  Supine Shoulder Flexion Extension AAROM with Dowel - 10 reps - 1 sets - 3x daily - 7x weekly  Standing Shoulder Abduction AAROM with Dowel - 10 reps - 1 sets - 3x daily - 7x weekly  Seated Scapular Retraction - 10 reps - 1 sets - 5 sec hold - 3x daily - 7x weekly    Concluded with ice to left shoulder x 15 min.  Patient instructed to ice at least 3x per day.    Assessment & Plan     Assessment  Impairments: abnormal or restricted ROM, activity intolerance, impaired physical strength, lacks appropriate home exercise program, pain with function and weight-bearing intolerance  Assessment details: 54 yo male who presents with the impairments noted above  secondary to left shoulder: pain, decreased range of motion, decreased strength, decreased joint play, decreased functional mobility and decreased ability to sleep through the night..  These impairments decrease his ability and tolerance to perform his normal daily activities.  This patient presents with a level of complexity and his condition is such that the services required can be safely and effectively performed only by a qualified PT or supervised PTA.     Prognosis: good  Functional Limitations: carrying objects, lifting, sleeping, pulling, pushing, uncomfortable because of pain, moving in bed, reaching behind back, reaching overhead and unable to perform repetitive tasks  Goals  Plan Goals: STG (3 weeks)  1) Will be independent in home program for basic active assistive range of motion of the (L) shoulder  2) Will demonstrate basic understanding of his condition and his role in treatment progression  3) will tolerate initiation of isometrics or resistive strengthening of his (L) shoulder.  4) will demonstrate moderate improvement in tolerance to daily activity as evidenced by QuickDASH: score of 50% or less    LTG (12 weeks)  1) independent in home program for self-management of his condition  2) will demonstrate significant improvement in tolerance to daily activity as evidenced by QuickDASH: score of 15% or less  3) will demonstrate AROM of the (L) shoulder flexion and abduction of 170 degrees or greater to allow for full functional use of the (L) upper extremity to allow for transition to advanced strengthening program.  4) will demonstrate AROM of the (L shoulder ER of 85 degrees or greater to allow for full functional use of the (L) upper extremity to allow for transition to advanced strengthening program.  5) Will demonstrate MMT of the (L) shoulder of 4+/5 or greater in all major planes to allow for progressing to return to sports and work activities  6) will demonstrate significant improvement in  tolerance to work activity as evidenced by QuickDASH: Work score of 20% or less  7) will demonstrate significant improvement in tolerance to sports activity as evidenced by QuickDASH: Work score of 20% or less      Plan  Therapy options: will be seen for skilled physical therapy services  Planned modality interventions: cryotherapy, high voltage pulsed current (pain management), high voltage pulsed current (spasm management), microcurrent electrical stimulation, TENS, thermotherapy (hydrocollator packs) and ultrasound  Planned therapy interventions: IADL retraining, body mechanics training, flexibility, functional ROM exercises, home exercise program, joint mobilization, manual therapy, neuromuscular re-education, postural training, soft tissue mobilization, strengthening, stretching and therapeutic activities  Frequency: 2x week  Duration in visits: 20  Treatment plan discussed with: patient        History # of Personal Factors and/or Comorbidities: LOW (0)  Examination of Body System(s): # of elements: LOW (1-2)  Clinical Presentation: STABLE   Clinical Decision Making: LOW     Timed:         Manual Therapy:         mins  22567;     Therapeutic Exercise:    10     mins  16727;     Neuromuscular Gilberto:        mins  83531;    Therapeutic Activity:          mins  66765;     Gait Training:           mins  94820;     Ultrasound:          mins  58238;    Ionto                                   mins   07883  Self Care                            mins   84602  Canalith Repos         mins 52973      Un-Timed:  Electrical Stimulation:         mins  07794 ( );  Traction          mins 17849  Dry Needle                 ______ mins DRYNDL  Low Eval     45    Mins  85648  Mod Eval          Mins  97047  High Eval                            Mins  98040  Re-Eval                               mins  04085        Timed Treatment:   10   mins   Total Treatment:     80   mins    PT SIGNATURE: Maynor Redding, YENIFER   DATE TREATMENT  INITIATED: 3/5/2021    Initial Certification  Certification Period: 6/3/2021  I certify that the therapy services are furnished while this patient is under my care.  The services outlined above are required by this patient, and will be reviewed every 90 days.     PHYSICIAN: Wan Mccormick MD      DATE:     Please sign and return via fax to [phone].. Thank you, [facility].

## 2021-03-09 ENCOUNTER — TREATMENT (OUTPATIENT)
Dept: PHYSICAL THERAPY | Facility: CLINIC | Age: 56
End: 2021-03-09

## 2021-03-09 DIAGNOSIS — M75.122 COMPLETE TEAR OF LEFT ROTATOR CUFF, UNSPECIFIED WHETHER TRAUMATIC: ICD-10-CM

## 2021-03-09 DIAGNOSIS — Z47.89 ORTHOPEDIC AFTERCARE: Primary | ICD-10-CM

## 2021-03-09 PROCEDURE — 97140 MANUAL THERAPY 1/> REGIONS: CPT | Performed by: PHYSICAL THERAPIST

## 2021-03-09 PROCEDURE — 97014 ELECTRIC STIMULATION THERAPY: CPT | Performed by: PHYSICAL THERAPIST

## 2021-03-09 PROCEDURE — 97110 THERAPEUTIC EXERCISES: CPT | Performed by: PHYSICAL THERAPIST

## 2021-03-09 NOTE — PROGRESS NOTES
Physical Therapy Daily Progress Note        Patient: Jovany Taveras   : 1965  Diagnosis/ICD-10 Code:  Orthopedic aftercare [Z47.89]  Referring practitioner: Wan Mccormick, *  Date of Initial Visit: Type: THERAPY  Noted: 3/5/2021  Today's Date: 3/9/2021  Patient seen for 2 sessions             2 Weeks post op as of 3/9/2021    Subjective     Jovany Taveras reports: still with significant pain, but is able to move the shoulder some.  No complaints with home program.    Objective   See Exercise, Manual, and Modality Logs for complete treatment.     Initiated joint mobilizations to improve joint play    Initiated flexion light shoulder isometrics           Assessment/Plan Improved PROM and improved tolerance to AAROM.  Still in considerable pain.      Progress strengthening /stabilization /functional activity as tolerated and per MD protocol           Manual Therapy:    15     mins  03684;  Therapeutic Exercise:    15     mins  93224;     Neuromuscular Gilberto:        mins  90859;    Therapeutic Activity:          mins  24543;     Gait Training:           mins  57832;     Ultrasound:          mins  39728;    Electrical Stimulation:    15     mins  09569 ( );  Dry Needling          mins self-pay    Timed Treatment:   30   mins   Total Treatment:     45   mins    Maynor Redding PT  Physical Therapist

## 2021-03-11 ENCOUNTER — TREATMENT (OUTPATIENT)
Dept: PHYSICAL THERAPY | Facility: CLINIC | Age: 56
End: 2021-03-11

## 2021-03-11 DIAGNOSIS — Z47.89 ORTHOPEDIC AFTERCARE: Primary | ICD-10-CM

## 2021-03-11 DIAGNOSIS — M75.122 COMPLETE TEAR OF LEFT ROTATOR CUFF, UNSPECIFIED WHETHER TRAUMATIC: ICD-10-CM

## 2021-03-11 PROCEDURE — 97140 MANUAL THERAPY 1/> REGIONS: CPT | Performed by: PHYSICAL THERAPIST

## 2021-03-11 PROCEDURE — 97112 NEUROMUSCULAR REEDUCATION: CPT | Performed by: PHYSICAL THERAPIST

## 2021-03-11 PROCEDURE — 97014 ELECTRIC STIMULATION THERAPY: CPT | Performed by: PHYSICAL THERAPIST

## 2021-03-11 PROCEDURE — 97110 THERAPEUTIC EXERCISES: CPT | Performed by: PHYSICAL THERAPIST

## 2021-03-11 RX ORDER — NAPROXEN 500 MG/1
TABLET ORAL
Qty: 28 TABLET | Refills: 0 | OUTPATIENT
Start: 2021-03-11

## 2021-03-11 NOTE — PROGRESS NOTES
Physical Therapy Daily Progress Note        Patient: Jovany Taveras   : 1965  Diagnosis/ICD-10 Code:  Orthopedic aftercare [Z47.89]  Referring practitioner: Wan Mccormick, *. Follow up 3/22/21  Date of Initial Visit: Type: THERAPY  Noted: 3/5/2021  Today's Date: 3/11/2021  Patient seen for 3 sessions  POC 20, 2x/wk    s/p L arthroscopy supraspinatus and Bankart repair on 21    DATE 2021 2021 3/5/2021 3/12/2021 3/19/2021 3/26/2021 2021 2021 2021 2021   POST OP WEEK 0 1 2 3 4 5 6 7 8 9   DATE 2021   POST OP WEEK 10 11 12 13 14 15 16 17 18 19   DATE 2021 2021 2021 2021 2021 2021 2021 2021 9/3/2021 9/10/2021   POST OP WEEK 20 21 22 23 24 25 26 27 28 29                    Subjective : No pain currently. He does note that pain can go up very quickly and as high as a 12/10.     Objective   See Exercise, Manual, and Modality Logs for complete treatment.       Assessment/Plan:  Patient very guarded, especially during PROM/stretch.  L shoulder tight, especially abduction and external rotation.     Progress per Plan of Care :  Monitor response, continue as appropriate per protocol.     Manual Therapy:           15      mins  26519;  Therapeutic Exercise:    20     mins  33835;     Neuromuscular Gilberto:  10      mins  32671;    Therapeutic Activity:           mins  05462;     Gait Training:                      mins  24804;     Ultrasound:                          mins  68546;    Electrical Stimulation:    15     mins  47143 ( );  Dry Needling                       mins self-pay     Timed Treatment:   45   mins   Total Treatment:     60   mins           Jennifer Waldron PTA  Physical Therapist Assistant

## 2021-03-16 ENCOUNTER — TREATMENT (OUTPATIENT)
Dept: PHYSICAL THERAPY | Facility: CLINIC | Age: 56
End: 2021-03-16

## 2021-03-16 DIAGNOSIS — Z47.89 ORTHOPEDIC AFTERCARE: Primary | ICD-10-CM

## 2021-03-16 DIAGNOSIS — M75.122 COMPLETE TEAR OF LEFT ROTATOR CUFF, UNSPECIFIED WHETHER TRAUMATIC: ICD-10-CM

## 2021-03-16 PROCEDURE — 97530 THERAPEUTIC ACTIVITIES: CPT | Performed by: PHYSICAL THERAPIST

## 2021-03-16 PROCEDURE — 97140 MANUAL THERAPY 1/> REGIONS: CPT | Performed by: PHYSICAL THERAPIST

## 2021-03-16 PROCEDURE — 97110 THERAPEUTIC EXERCISES: CPT | Performed by: PHYSICAL THERAPIST

## 2021-03-16 PROCEDURE — 97014 ELECTRIC STIMULATION THERAPY: CPT | Performed by: PHYSICAL THERAPIST

## 2021-03-16 NOTE — PROGRESS NOTES
Physical Therapy Daily Progress Note    VISIT#: 4    Subjective   Jovany Taveras reports: Shoulder is sore this am, is having concerns about not being able to move arm away from side when at home. Is using sling most of day, sleep has gotten better.       Objective     See Exercise, Manual, and Modality Logs for complete treatment.     Patient Education: healing time frames, importance of sling use, proper scap position with ex     Assessment/Plan  Pt with good ryley to slight changes with program today, including max ed on proper posture, relationship between scap movement and shoulder movement, as well as healing times. Issued standing RC isometrics for HEP, pt with good understanding. IFC for pain relief at end of session.     Progress per Plan of Care        Timed:         Manual Therapy:    20     mins  31794;     Therapeutic Exercise:    12     mins  84163;     Neuromuscular Gilberto:        mins  30298;    Therapeutic Activity:     10     mins  11080;     Gait Training:           mins  30459;     Ultrasound:          mins  24122;    Ionto                                   mins   54885  Self Care                       5     mins   87758  Canalith Repos                   mins  31755    Un-Timed:  Electrical Stimulation:    15     mins  62780 ( );  Traction          mins 40020  Dry Needle                 ______ mins DRYNDL  Low Eval          Mins  79681  Mod Eval          Mins  61347  High Eval                            Mins  38340  Re-Eval                               mins  80743    Timed Treatment:   47   mins   Total Treatment:     64   mins    Elena Roca PT    Physical Therapist

## 2021-03-18 ENCOUNTER — TREATMENT (OUTPATIENT)
Dept: PHYSICAL THERAPY | Facility: CLINIC | Age: 56
End: 2021-03-18

## 2021-03-18 DIAGNOSIS — Z47.89 ORTHOPEDIC AFTERCARE: Primary | ICD-10-CM

## 2021-03-18 DIAGNOSIS — M75.122 COMPLETE TEAR OF LEFT ROTATOR CUFF, UNSPECIFIED WHETHER TRAUMATIC: ICD-10-CM

## 2021-03-18 PROCEDURE — 97140 MANUAL THERAPY 1/> REGIONS: CPT | Performed by: PHYSICAL THERAPIST

## 2021-03-18 PROCEDURE — 97530 THERAPEUTIC ACTIVITIES: CPT | Performed by: PHYSICAL THERAPIST

## 2021-03-18 PROCEDURE — 97110 THERAPEUTIC EXERCISES: CPT | Performed by: PHYSICAL THERAPIST

## 2021-03-18 PROCEDURE — 97014 ELECTRIC STIMULATION THERAPY: CPT | Performed by: PHYSICAL THERAPIST

## 2021-03-18 NOTE — PROGRESS NOTES
Physical Therapy Daily Progress Note    VISIT#: 5    Subjective   Jovany Taveras reports: Shoulder has been pretty sore since last session, but it is better today. Did not need to take any medicine for it.     Objective     See Exercise, Manual, and Modality Logs for complete treatment.     Patient Education: AROM to tolerance, no sharp pains    Assessment/Plan  Able to initiate gentle flexion AROM today, painfree. Max ed to avoid any motions with sharp pain, pt with good understanding. Issued 0-30 deg AROM flex for HEP progression.     Progress per Plan of Care        Timed:         Manual Therapy:    19     mins  04245;     Therapeutic Exercise:    12     mins  64145;     Neuromuscular Gilberto:        mins  66736;    Therapeutic Activity:     10     mins  95118;     Gait Training:           mins  89306;     Ultrasound:          mins  41189;    Ionto                                   mins   22963  Self Care                            mins   90104  Canalith Repos                   mins  98451    Un-Timed:  Electrical Stimulation:    15     mins  37327 ( );  Traction          mins 07753  Dry Needle                 ______ mins DRYNDL  Low Eval          Mins  73420  Mod Eval          Mins  47992  High Eval                            Mins  51958  Re-Eval                               mins  77745    Timed Treatment:   41   mins   Total Treatment:     59   mins    Elena Roca, PT    Physical Therapist

## 2021-03-22 ENCOUNTER — OFFICE VISIT (OUTPATIENT)
Dept: ORTHOPEDIC SURGERY | Facility: CLINIC | Age: 56
End: 2021-03-22

## 2021-03-22 ENCOUNTER — TREATMENT (OUTPATIENT)
Dept: PHYSICAL THERAPY | Facility: CLINIC | Age: 56
End: 2021-03-22

## 2021-03-22 VITALS
HEIGHT: 69 IN | SYSTOLIC BLOOD PRESSURE: 131 MMHG | HEART RATE: 62 BPM | WEIGHT: 185 LBS | DIASTOLIC BLOOD PRESSURE: 76 MMHG | BODY MASS INDEX: 27.4 KG/M2

## 2021-03-22 DIAGNOSIS — M75.122 COMPLETE TEAR OF LEFT ROTATOR CUFF, UNSPECIFIED WHETHER TRAUMATIC: ICD-10-CM

## 2021-03-22 DIAGNOSIS — Z47.89 ORTHOPEDIC AFTERCARE: Primary | ICD-10-CM

## 2021-03-22 PROCEDURE — 97014 ELECTRIC STIMULATION THERAPY: CPT | Performed by: PHYSICAL THERAPIST

## 2021-03-22 PROCEDURE — 97110 THERAPEUTIC EXERCISES: CPT | Performed by: PHYSICAL THERAPIST

## 2021-03-22 PROCEDURE — 97140 MANUAL THERAPY 1/> REGIONS: CPT | Performed by: PHYSICAL THERAPIST

## 2021-03-22 PROCEDURE — 99024 POSTOP FOLLOW-UP VISIT: CPT | Performed by: ORTHOPAEDIC SURGERY

## 2021-03-22 NOTE — PROGRESS NOTES
"   Physical Therapy Daily Progress Note        Patient: Jovany Taveras   : 1965  Diagnosis/ICD-10 Code:  Orthopedic aftercare [Z47.89]  Referring practitioner: Wan Mccormick, *  Date of Initial Visit: Type: THERAPY  Noted: 3/5/2021  Today's Date: 3/22/2021  Patient seen for 6 sessions             4 Weeks post op as of 3/22/2021    Subjective     Jovany Taveras reports: Shoulder no longer hurts when he is at rest but does with certain motions.   Reports seeing MD and being instructed \"not to push it\" due to combination of labral repair and RCR.    Pain level (0-10): 0 (at rest)    Objective   See Exercise, Manual, and Modality Logs for complete treatment.       Assessment/Plan Improved PROM and improved tolerance to AAROM.  Still in considerable pain.      Progress strengthening /stabilization /functional activity as tolerated and per MD protocol           Manual Therapy:    15     mins  27100;  Therapeutic Exercise:    30     mins  57193;     Neuromuscular Gilberto:        mins  11376;    Therapeutic Activity:          mins  31399;     Gait Training:           mins  29651;     Ultrasound:          mins  41463;    Electrical Stimulation:    15     mins  13647 ( );  Dry Needling          mins self-pay    Timed Treatment:   45   mins   Total Treatment:     60   mins    Maynor Redding PT  Physical Therapist                    "

## 2021-03-22 NOTE — PROGRESS NOTES
Patient ID: Jovany Taveras is a 55 y.o. male.  2/19/21 left shoulder arthroscopy supraspinatus and Bankart repair  Pain mild      Objective:    There were no vitals taken for this visit.    Physical Examination:    Incisions are healed, passive elevation 130 degrees external rotation 5 degrees    Imaging:      Assessment:  Doing well after Bankart and cuff repair    Plan:  Restrictions discussed. Continue physical therapy. See me in 8 weeks. Sling for 2 weeks    Procedures

## 2021-03-25 ENCOUNTER — TREATMENT (OUTPATIENT)
Dept: PHYSICAL THERAPY | Facility: CLINIC | Age: 56
End: 2021-03-25

## 2021-03-25 DIAGNOSIS — Z47.89 ORTHOPEDIC AFTERCARE: Primary | ICD-10-CM

## 2021-03-25 DIAGNOSIS — M75.122 COMPLETE TEAR OF LEFT ROTATOR CUFF, UNSPECIFIED WHETHER TRAUMATIC: ICD-10-CM

## 2021-03-25 PROCEDURE — 97530 THERAPEUTIC ACTIVITIES: CPT | Performed by: PHYSICAL THERAPIST

## 2021-03-25 PROCEDURE — 97140 MANUAL THERAPY 1/> REGIONS: CPT | Performed by: PHYSICAL THERAPIST

## 2021-03-25 PROCEDURE — 97110 THERAPEUTIC EXERCISES: CPT | Performed by: PHYSICAL THERAPIST

## 2021-03-25 PROCEDURE — 97014 ELECTRIC STIMULATION THERAPY: CPT | Performed by: PHYSICAL THERAPIST

## 2021-03-25 NOTE — PROGRESS NOTES
Physical Therapy Daily Progress Note    VISIT#: 7    Subjective   Jovany Taveras reports: A little sore today, 2/10 pain at beginning of session.       Objective     See Exercise, Manual, and Modality Logs for complete treatment.     Patient Education: avoiding sharp pains and quick movements with shoulder, restrictions per protocol     Assessment/Plan  Improved passive motion noted today, however pt continues to be most limited with abduction. No sharp pains during PROM, however pt c/o sharp pain during abduction AAROM in standing, therefore pt educated to avoid lifting to point of sharp pain. Pt with good understanding.     Progress per Plan of Care and Progress strengthening /stabilization /functional activity        Timed:         Manual Therapy:    19     mins  30074;     Therapeutic Exercise:    13     mins  99564;     Neuromuscular Gilberto:        mins  43250;    Therapeutic Activity:     10     mins  66137;     Gait Training:           mins  93738;     Ultrasound:          mins  22619;    Ionto                                   mins   77105  Self Care                            mins   30157  Canalith Repos                   mins  56909    Un-Timed:  Electrical Stimulation:    15     mins  30051 ( );  Traction          mins 87957  Dry Needle                 ______ mins DRYNDL  Low Eval          Mins  31508  Mod Eval          Mins  14135  High Eval                            Mins  26637  Re-Eval                               mins  48327    Timed Treatment:   42   mins   Total Treatment:     58   mins    Elena Roca, PT    Physical Therapist

## 2021-03-30 ENCOUNTER — TREATMENT (OUTPATIENT)
Dept: PHYSICAL THERAPY | Facility: CLINIC | Age: 56
End: 2021-03-30

## 2021-03-30 DIAGNOSIS — Z47.89 ORTHOPEDIC AFTERCARE: Primary | ICD-10-CM

## 2021-03-30 DIAGNOSIS — M75.122 COMPLETE TEAR OF LEFT ROTATOR CUFF, UNSPECIFIED WHETHER TRAUMATIC: ICD-10-CM

## 2021-03-30 PROCEDURE — 97014 ELECTRIC STIMULATION THERAPY: CPT | Performed by: PHYSICAL THERAPIST

## 2021-03-30 PROCEDURE — 97110 THERAPEUTIC EXERCISES: CPT | Performed by: PHYSICAL THERAPIST

## 2021-03-30 PROCEDURE — 97140 MANUAL THERAPY 1/> REGIONS: CPT | Performed by: PHYSICAL THERAPIST

## 2021-03-30 NOTE — PROGRESS NOTES
Physical Therapy Daily Progress Note    VISIT#: 8    Subjective   Jovany Taveras reports: Arm is feeling ok today, no pain, however still having issues with lifting the arm out to the side. Reporting he has not been using the arm at home though.   Pt arriving 15 min late to appt today.     Objective     See Exercise, Manual, and Modality Logs for complete treatment.     Patient Education: protocol progressions     Assessment/Plan  Pt with good ryley to progressions with prone scap strength, no pain during session. Pt issued printout with progressions, educated to continue following lifting precautions, pt with good understanding. Included prone scap strength and UBE with no issues.     Progress per Plan of Care        Timed:         Manual Therapy:    16     mins  52172;     Therapeutic Exercise:    17     mins  60794;     Neuromuscular Gilberto:        mins  02404;    Therapeutic Activity:          mins  51910;     Gait Training:           mins  31948;     Ultrasound:          mins  37604;    Ionto                                   mins   93269  Self Care                            mins   32961  Canalith Repos                   mins  43098    Un-Timed:  Electrical Stimulation:    15     mins  93735 ( );  Traction          mins 21940  Dry Needle                 ______ mins DRYNDL  Low Eval          Mins  82015  Mod Eval          Mins  12255  High Eval                            Mins  69269  Re-Eval                               mins  44932    Timed Treatment:   33   mins   Total Treatment:     50   mins    Elena Roca, PT    Physical Therapist

## 2021-04-01 ENCOUNTER — TREATMENT (OUTPATIENT)
Dept: PHYSICAL THERAPY | Facility: CLINIC | Age: 56
End: 2021-04-01

## 2021-04-01 DIAGNOSIS — M75.122 COMPLETE TEAR OF LEFT ROTATOR CUFF, UNSPECIFIED WHETHER TRAUMATIC: ICD-10-CM

## 2021-04-01 DIAGNOSIS — Z47.89 ORTHOPEDIC AFTERCARE: Primary | ICD-10-CM

## 2021-04-01 DIAGNOSIS — M25.512 ACUTE PAIN OF LEFT SHOULDER: ICD-10-CM

## 2021-04-01 PROCEDURE — 97140 MANUAL THERAPY 1/> REGIONS: CPT | Performed by: PHYSICAL THERAPIST

## 2021-04-01 PROCEDURE — 97014 ELECTRIC STIMULATION THERAPY: CPT | Performed by: PHYSICAL THERAPIST

## 2021-04-01 PROCEDURE — 97110 THERAPEUTIC EXERCISES: CPT | Performed by: PHYSICAL THERAPIST

## 2021-04-01 NOTE — PROGRESS NOTES
Physical Therapy Daily Progress Note    VISIT#: 9    Subjective   Jovany Taveras reports that he is doing well today and that [facility] with no pain.   Pain Rating (0/10): 0    Objective     See Exercise, Manual, and Modality Logs for complete treatment.     Assessment/Plan   Pt continues to demonstrate decreased ROM and decreased mobility with posterior and inferior glide. Pt arrived 20 min late to his session limiting amount of time available for her session.      Plan  Progress per Plan of Care and Progress strengthening /stabilization /functional activity            Timed:         Manual Therapy:    16     mins  10550;     Therapeutic Exercise:    17     mins  59967;     Neuromuscular Gilberto:        mins  23685;    Therapeutic Activity:          mins  90697;     Gait Training:           mins  02560;     Ultrasound:          mins  77458;    Ionto                                   mins   87743  Self Care                            mins   73945    Un-Timed:  Electrical Stimulation:    15     mins  46868 ( );  Dry Needling          mins self-pay  Traction          mins 07508  Low Eval          Mins  10175  Mod Eval          Mins  81445  High Eval                            Mins  58856  Re-Eval                               mins  92839    Timed Treatment:   33   mins   Total Treatment:     48   mins    Mylene Vasquez PT, DPT  Physical Therapist

## 2021-04-05 DIAGNOSIS — F98.8 ATTENTION DEFICIT DISORDER (ADD) WITHOUT HYPERACTIVITY: ICD-10-CM

## 2021-04-05 RX ORDER — DEXTROAMPHETAMINE SACCHARATE, AMPHETAMINE ASPARTATE, DEXTROAMPHETAMINE SULFATE AND AMPHETAMINE SULFATE 5; 5; 5; 5 MG/1; MG/1; MG/1; MG/1
1 TABLET ORAL 2 TIMES DAILY
Qty: 60 TABLET | Refills: 0 | Status: SHIPPED | OUTPATIENT
Start: 2021-04-05 | End: 2021-05-06 | Stop reason: SDUPTHER

## 2021-04-05 NOTE — TELEPHONE ENCOUNTER
Caller: Jovany Taveras    Relationship: Self    Best call back number:[phone]   Medication needed:   Requested Prescriptions     Pending Prescriptions Disp Refills   • amphetamine-dextroamphetamine (Adderall) 20 MG tablet 60 tablet 0     Sig: Take 1 tablet by mouth 2 (Two) Times a Day.       When do you need the refill by: ASAP    Does the patient have less than a 3 day supply:  [x] Yes  [] No    What is the patient's preferred pharmacy: [id number] [place] [address] [address] AT [address] OF [place] [address] & [facility] - [phone]  - [phone] FX

## 2021-04-06 ENCOUNTER — TREATMENT (OUTPATIENT)
Dept: PHYSICAL THERAPY | Facility: CLINIC | Age: 56
End: 2021-04-06

## 2021-04-06 DIAGNOSIS — M25.512 ACUTE PAIN OF LEFT SHOULDER: ICD-10-CM

## 2021-04-06 DIAGNOSIS — Z47.89 ORTHOPEDIC AFTERCARE: Primary | ICD-10-CM

## 2021-04-06 DIAGNOSIS — M75.122 COMPLETE TEAR OF LEFT ROTATOR CUFF, UNSPECIFIED WHETHER TRAUMATIC: ICD-10-CM

## 2021-04-06 PROCEDURE — 97110 THERAPEUTIC EXERCISES: CPT | Performed by: PHYSICAL THERAPIST

## 2021-04-06 PROCEDURE — 97014 ELECTRIC STIMULATION THERAPY: CPT | Performed by: PHYSICAL THERAPIST

## 2021-04-06 PROCEDURE — 97140 MANUAL THERAPY 1/> REGIONS: CPT | Performed by: PHYSICAL THERAPIST

## 2021-04-06 PROCEDURE — 97530 THERAPEUTIC ACTIVITIES: CPT | Performed by: PHYSICAL THERAPIST

## 2021-04-09 ENCOUNTER — TREATMENT (OUTPATIENT)
Dept: PHYSICAL THERAPY | Facility: CLINIC | Age: 56
End: 2021-04-09

## 2021-04-09 DIAGNOSIS — M25.512 ACUTE PAIN OF LEFT SHOULDER: ICD-10-CM

## 2021-04-09 DIAGNOSIS — M75.122 COMPLETE TEAR OF LEFT ROTATOR CUFF, UNSPECIFIED WHETHER TRAUMATIC: ICD-10-CM

## 2021-04-09 DIAGNOSIS — Z47.89 ORTHOPEDIC AFTERCARE: Primary | ICD-10-CM

## 2021-04-09 PROCEDURE — 97014 ELECTRIC STIMULATION THERAPY: CPT | Performed by: PHYSICAL THERAPIST

## 2021-04-09 PROCEDURE — 97110 THERAPEUTIC EXERCISES: CPT | Performed by: PHYSICAL THERAPIST

## 2021-04-09 PROCEDURE — 97530 THERAPEUTIC ACTIVITIES: CPT | Performed by: PHYSICAL THERAPIST

## 2021-04-09 PROCEDURE — 97140 MANUAL THERAPY 1/> REGIONS: CPT | Performed by: PHYSICAL THERAPIST

## 2021-04-09 NOTE — PROGRESS NOTES
Physical Therapy Daily Progress Note    VISIT#: 11    Subjective   Jovany Taveras reports: Was really sore after last session, nothing sharp though.     Objective     See Exercise, Manual, and Modality Logs for complete treatment.     Patient Education: protocol limitations and next phase     Assessment/Plan  Pt with good ROM today, meeting protocol ranges without issue with flexion and abduction. Good carryover with scap strengthening, pt requiring only min cues for form.     Progress per Plan of Care          Timed:         Manual Therapy:    17     mins  89945;     Therapeutic Exercise:    13     mins  19707;     Neuromuscular Gilberto:        mins  24997;    Therapeutic Activity:     10     mins  21055;     Gait Training:           mins  75942;     Ultrasound:          mins  96080;    Ionto                                   mins   96804  Self Care                            mins   33492  Canalith Repos                   mins  13656    Un-Timed:  Electrical Stimulation:    15     mins  00178 ( );  Traction          mins 14664  Dry Needle                 ______ mins DRYNDL  Low Eval          Mins  95157  Mod Eval          Mins  04209  High Eval                            Mins  17042  Re-Eval                               mins  67154    Timed Treatment:   40   mins   Total Treatment:     58   mins    Elena Roca PT    Physical Therapist

## 2021-04-13 ENCOUNTER — TREATMENT (OUTPATIENT)
Dept: PHYSICAL THERAPY | Facility: CLINIC | Age: 56
End: 2021-04-13

## 2021-04-13 DIAGNOSIS — Z47.89 ORTHOPEDIC AFTERCARE: Primary | ICD-10-CM

## 2021-04-13 DIAGNOSIS — M25.512 ACUTE PAIN OF LEFT SHOULDER: ICD-10-CM

## 2021-04-13 DIAGNOSIS — M75.122 COMPLETE TEAR OF LEFT ROTATOR CUFF, UNSPECIFIED WHETHER TRAUMATIC: ICD-10-CM

## 2021-04-13 PROCEDURE — 97530 THERAPEUTIC ACTIVITIES: CPT | Performed by: PHYSICAL THERAPIST

## 2021-04-13 PROCEDURE — 97110 THERAPEUTIC EXERCISES: CPT | Performed by: PHYSICAL THERAPIST

## 2021-04-13 PROCEDURE — 97140 MANUAL THERAPY 1/> REGIONS: CPT | Performed by: PHYSICAL THERAPIST

## 2021-04-13 PROCEDURE — 97014 ELECTRIC STIMULATION THERAPY: CPT | Performed by: PHYSICAL THERAPIST

## 2021-04-13 NOTE — PROGRESS NOTES
Physical Therapy Daily Progress Note    VISIT#: 12    Subjective   Jovany Taveras reports: Was not sore after last session, but feels tight this morning.       Objective     See Exercise, Manual, and Modality Logs for complete treatment.     Patient Education: can add light weights for scap strength - 1-3#     Assessment/Plan  Good ryley to strength additions today, no pain during session. Pt progressing well. Ed to add 1-3# weights to HEP for prone scap strength. Also ed to avoid body weight ex (plank) til 10-12 weeks post op, pt with good understanding.     Progress per Plan of Care        Timed:         Manual Therapy:    17     mins  78086;     Therapeutic Exercise:    10     mins  54933;     Neuromuscular Gilberto:        mins  44889;    Therapeutic Activity:     14     mins  90107;     Gait Training:           mins  38480;     Ultrasound:          mins  39448;    Ionto                                   mins   01601  Self Care                            mins   19670  Canalith Repos                   mins  41725    Un-Timed:  Electrical Stimulation:    15     mins  40915 ( );  Traction          mins 06682  Dry Needle                 ______ mins DRYNDL  Low Eval          Mins  73249  Mod Eval          Mins  56861  High Eval                            Mins  59271  Re-Eval                               mins  39051    Timed Treatment:   41   mins   Total Treatment:     58   mins    Elena Roca, PT    Physical Therapist

## 2021-04-15 ENCOUNTER — TREATMENT (OUTPATIENT)
Dept: PHYSICAL THERAPY | Facility: CLINIC | Age: 56
End: 2021-04-15

## 2021-04-15 ENCOUNTER — TELEPHONE (OUTPATIENT)
Dept: FAMILY MEDICINE CLINIC | Facility: CLINIC | Age: 56
End: 2021-04-15

## 2021-04-15 DIAGNOSIS — Z47.89 ORTHOPEDIC AFTERCARE: Primary | ICD-10-CM

## 2021-04-15 DIAGNOSIS — M25.512 ACUTE PAIN OF LEFT SHOULDER: ICD-10-CM

## 2021-04-15 DIAGNOSIS — M75.122 COMPLETE TEAR OF LEFT ROTATOR CUFF, UNSPECIFIED WHETHER TRAUMATIC: ICD-10-CM

## 2021-04-15 PROCEDURE — 97110 THERAPEUTIC EXERCISES: CPT | Performed by: PHYSICAL THERAPIST

## 2021-04-15 PROCEDURE — 97140 MANUAL THERAPY 1/> REGIONS: CPT | Performed by: PHYSICAL THERAPIST

## 2021-04-15 PROCEDURE — 97530 THERAPEUTIC ACTIVITIES: CPT | Performed by: PHYSICAL THERAPIST

## 2021-04-15 NOTE — TELEPHONE ENCOUNTER
Caller: Jovany Taveras    Relationship: Self    Best call back number: [phone]    What specialty or service is being requested: COLONOSCOPY    Any additional details: PATIENT STATES HE RECEIVED A MESSAGE FROM  [facility] STATING IT IS TIME FOR A ROUTINE COLONOSCOPY. PATIENT IS REQUESTING A REFERRAL.

## 2021-04-15 NOTE — TELEPHONE ENCOUNTER
PT INFORMED OF NOT HAVING COLONOSCOPY UNTIL 2023 PT DOING WELL WITH SHOULDER SURGERY WANTED TO THANKYOU FOR SENDING HIM TO DR [facility]

## 2021-04-15 NOTE — PROGRESS NOTES
Physical Therapy Daily Progress Note    VISIT#: 13    Subjective   Jovany Taveras reports: Feeling good, not sore after last time.       Objective     See Exercise, Manual, and Modality Logs for complete treatment.     Patient Education: protocol changes next week for motion     Assessment/Plan  No increases in pain noted during session today, passive flexion with increased tightness however pt was still able to reach motion per protocol.     Progress per Plan of Care        Timed:         Manual Therapy:    18     mins  71596;     Therapeutic Exercise:    10     mins  53308;     Neuromuscular iGlberto:        mins  57910;    Therapeutic Activity:     15     mins  83098;     Gait Training:           mins  50116;     Ultrasound:          mins  37874;    Ionto                                   mins   39396  Self Care                            mins   40326  Canalith Repos                   mins  99644    Un-Timed:  Electrical Stimulation:         mins  39090 ( );  Traction          mins 95577  Dry Needle                 ______ mins DRYNDL  Low Eval          Mins  58593  Mod Eval          Mins  14130  High Eval                            Mins  22914  Re-Eval                               mins  78889    Timed Treatment:  43    mins   Total Treatment:     43   mins    Elena Roca PT    Physical Therapist

## 2021-04-20 ENCOUNTER — TREATMENT (OUTPATIENT)
Dept: PHYSICAL THERAPY | Facility: CLINIC | Age: 56
End: 2021-04-20

## 2021-04-20 DIAGNOSIS — M25.512 ACUTE PAIN OF LEFT SHOULDER: ICD-10-CM

## 2021-04-20 DIAGNOSIS — M75.122 COMPLETE TEAR OF LEFT ROTATOR CUFF, UNSPECIFIED WHETHER TRAUMATIC: ICD-10-CM

## 2021-04-20 DIAGNOSIS — Z47.89 ORTHOPEDIC AFTERCARE: Primary | ICD-10-CM

## 2021-04-20 PROCEDURE — 97110 THERAPEUTIC EXERCISES: CPT | Performed by: PHYSICAL THERAPIST

## 2021-04-20 PROCEDURE — 97530 THERAPEUTIC ACTIVITIES: CPT | Performed by: PHYSICAL THERAPIST

## 2021-04-20 PROCEDURE — 97140 MANUAL THERAPY 1/> REGIONS: CPT | Performed by: PHYSICAL THERAPIST

## 2021-04-20 PROCEDURE — 97535 SELF CARE MNGMENT TRAINING: CPT | Performed by: PHYSICAL THERAPIST

## 2021-04-20 NOTE — PROGRESS NOTES
Physical Therapy Daily Progress Note    VISIT#: 14    Subjective   Jovany Taveras reports: No issues lately, did some of a workout class this morning and didn't have any pain. Used a 4# weight with slow bicep curls.     Objective     See Exercise, Manual, and Modality Logs for complete treatment.     Patient Education: protocol limitations     Assessment/Plan  Cues for scap retraction with prone ex, along with additional education on importance of mid and low trap strength along with latissimus dorsi strength, and re-educating upper trap usage. Pt with good understanding and good form after cues from PT.     Progress per Plan of Care        Timed:         Manual Therapy:    14     mins  25626;     Therapeutic Exercise:    9     mins  51617;     Neuromuscular Gilberto:       mins  13811;    Therapeutic Activity:     12     mins  88644;     Gait Training:           mins  75051;     Ultrasound:          mins  57784;    Ionto                                   mins   99178  Self Care                       8     mins   73611  Canalith Repos                   mins  06095    Un-Timed:  Electrical Stimulation:         mins  21403 ( );  Traction          mins 33106  Dry Needle                 ______ mins DRYNDL  Low Eval          Mins  93197  Mod Eval          Mins  48961  High Eval                            Mins  79774  Re-Eval                               mins  53345    Timed Treatment:   43   mins   Total Treatment:     43   mins    Elena Roca PT    Physical Therapist

## 2021-04-22 ENCOUNTER — TREATMENT (OUTPATIENT)
Dept: PHYSICAL THERAPY | Facility: CLINIC | Age: 56
End: 2021-04-22

## 2021-04-22 DIAGNOSIS — Z47.89 ORTHOPEDIC AFTERCARE: Primary | ICD-10-CM

## 2021-04-22 DIAGNOSIS — M75.122 COMPLETE TEAR OF LEFT ROTATOR CUFF, UNSPECIFIED WHETHER TRAUMATIC: ICD-10-CM

## 2021-04-22 DIAGNOSIS — M25.512 ACUTE PAIN OF LEFT SHOULDER: ICD-10-CM

## 2021-04-22 PROCEDURE — 97110 THERAPEUTIC EXERCISES: CPT | Performed by: PHYSICAL THERAPIST

## 2021-04-22 PROCEDURE — 97140 MANUAL THERAPY 1/> REGIONS: CPT | Performed by: PHYSICAL THERAPIST

## 2021-04-22 PROCEDURE — 97530 THERAPEUTIC ACTIVITIES: CPT | Performed by: PHYSICAL THERAPIST

## 2021-04-22 NOTE — PROGRESS NOTES
Physical Therapy Daily Progress Note    VISIT#: 15    Subjective   Jovany Taveras reports: No increases in pain recently. Still doing classes at the [facility].     Objective     See Exercise, Manual, and Modality Logs for complete treatment.     Patient Education: avoiding  Bicep with weights til 10 weeks     Assessment/Plan  Pt with good ryley to addition of theraband strength today for upper back strength. No pain during session. Issued green band for HEP progression, pt with good understanding and good motivation.     Progress per Plan of Care            Timed:         Manual Therapy:    14     mins  09441;     Therapeutic Exercise:    16     mins  81479;     Neuromuscular Gilberto:        mins  59256;    Therapeutic Activity:     10     mins  39066;     Gait Training:           mins  94030;     Ultrasound:         mins  99682;    Ionto                                   mins   04142  Self Care                            mins   56559  Canalith Repos                   mins  09380    Un-Timed:  Electrical Stimulation:         mins  66600 ( );  Traction          mins 37062  Dry Needle                 ______ mins DRYNDL  Low Eval          Mins  88826  Mod Eval          Mins  88815  High Eval                            Mins  29581  Re-Eval                               mins  24096    Timed Treatment:   40   mins   Total Treatment:     40   mins    Elena Roca, PT    Physical Therapist

## 2021-04-27 ENCOUNTER — LAB (OUTPATIENT)
Dept: FAMILY MEDICINE CLINIC | Facility: CLINIC | Age: 56
End: 2021-04-27

## 2021-04-27 ENCOUNTER — OFFICE VISIT (OUTPATIENT)
Dept: FAMILY MEDICINE CLINIC | Facility: CLINIC | Age: 56
End: 2021-04-27

## 2021-04-27 VITALS
TEMPERATURE: 98.2 F | HEART RATE: 67 BPM | WEIGHT: 186 LBS | BODY MASS INDEX: 27.55 KG/M2 | OXYGEN SATURATION: 98 % | HEIGHT: 69 IN | DIASTOLIC BLOOD PRESSURE: 78 MMHG | RESPIRATION RATE: 16 BRPM | SYSTOLIC BLOOD PRESSURE: 121 MMHG

## 2021-04-27 DIAGNOSIS — R35.0 URINARY FREQUENCY: ICD-10-CM

## 2021-04-27 DIAGNOSIS — R30.0 DYSURIA: Primary | ICD-10-CM

## 2021-04-27 LAB
ANION GAP SERPL CALCULATED.3IONS-SCNC: 10.6 MMOL/L (ref 5–15)
BILIRUB UR QL STRIP: NEGATIVE
BUN SERPL-MCNC: 22 MG/DL (ref 6–20)
BUN/CREAT SERPL: 23.2 (ref 7–25)
CALCIUM SPEC-SCNC: 9.7 MG/DL (ref 8.6–10.5)
CHLORIDE SERPL-SCNC: 102 MMOL/L (ref 98–107)
CLARITY UR: CLEAR
CO2 SERPL-SCNC: 28.4 MMOL/L (ref 22–29)
COLOR UR: YELLOW
CREAT SERPL-MCNC: 0.95 MG/DL (ref 0.76–1.27)
GFR SERPL CREATININE-BSD FRML MDRD: 83 ML/MIN/1.73
GLUCOSE SERPL-MCNC: 94 MG/DL (ref 65–99)
GLUCOSE UR STRIP-MCNC: NEGATIVE MG/DL
HGB UR QL STRIP.AUTO: NEGATIVE
KETONES UR QL STRIP: NEGATIVE
LEUKOCYTE ESTERASE UR QL STRIP.AUTO: NEGATIVE
NITRITE UR QL STRIP: NEGATIVE
PH UR STRIP.AUTO: 8.5 [PH] (ref 5–8)
POTASSIUM SERPL-SCNC: 5.1 MMOL/L (ref 3.5–5.2)
PROT UR QL STRIP: NEGATIVE
SODIUM SERPL-SCNC: 141 MMOL/L (ref 136–145)
SP GR UR STRIP: 1.02 (ref 1–1.03)
UROBILINOGEN UR QL STRIP: ABNORMAL

## 2021-04-27 PROCEDURE — 99214 OFFICE O/P EST MOD 30 MIN: CPT | Performed by: FAMILY MEDICINE

## 2021-04-27 PROCEDURE — 36415 COLL VENOUS BLD VENIPUNCTURE: CPT | Performed by: FAMILY MEDICINE

## 2021-04-27 PROCEDURE — 80048 BASIC METABOLIC PNL TOTAL CA: CPT | Performed by: FAMILY MEDICINE

## 2021-04-27 PROCEDURE — 87491 CHLMYD TRACH DNA AMP PROBE: CPT | Performed by: FAMILY MEDICINE

## 2021-04-27 PROCEDURE — 87591 N.GONORRHOEAE DNA AMP PROB: CPT | Performed by: FAMILY MEDICINE

## 2021-04-27 PROCEDURE — 81003 URINALYSIS AUTO W/O SCOPE: CPT | Performed by: FAMILY MEDICINE

## 2021-04-27 RX ORDER — MELOXICAM 15 MG/1
15 TABLET ORAL DAILY
COMMUNITY
Start: 2021-04-02 | End: 2021-06-02

## 2021-04-27 RX ORDER — SULFAMETHOXAZOLE AND TRIMETHOPRIM 800; 160 MG/1; MG/1
1 TABLET ORAL 2 TIMES DAILY
Qty: 20 TABLET | Refills: 0 | Status: SHIPPED | OUTPATIENT
Start: 2021-04-27 | End: 2021-05-07

## 2021-04-27 NOTE — PROGRESS NOTES
Subjective   Chief Complaint   Patient presents with   • Burning in  Penis   • Urinary Frequency     Jovany Taveras is a 56 y.o. male.     Patient Care Team:  Debra Frazier MD as PCP - General    He is coming in today due to urinary symptoms.  Tells me that few days ago he started noticing some burning at the tip of his penis.  He was concerned about some possible infection and he went to the urgent care to get checked.  He had urinalysis done which was negative for infection.  He also was checked for gonorrhea and chlamydia, herpes, and HIV and all this was negative.  Later on he also started noticing some urinary frequency.  He denies any blood in urine or any abdominal pain.  He denies any fever.  He was treated for chlamydia a few years ago, he is currently sexually active, however he denie any STD exposure as far as he knows.         The following portions of the patient's history were reviewed and updated as appropriate: allergies, current medications, past family history, past medical history, past social history, past surgical history and problem list.  Past Medical History:   Diagnosis Date   • ADD (attention deficit disorder)    • Allergic rhinitis    • Bulging lumbar disc    • Chlamydia infection    • Genital warts    • Osteoarthritis    • Rotator cuff tear    • Vitamin D deficiency      Past Surgical History:   Procedure Laterality Date   • COLONOSCOPY  09/10/2018    polyps removed; 5 years repeat   • SHOULDER ARTHROSCOPY W/ ROTATOR CUFF REPAIR Left 2/19/2021    Procedure: SHOULDER ARTHROSCOPY, BANKART REPAIR AND ROTATOR CUFF REPAIR;  Surgeon: Wan Mccormick MD;  Location: [facility] MAIN OR;  Service: Orthopedics;  Laterality: Left;   • SHOULDER SURGERY Left 02/19/2021    scope/ bankart repair   • TONSILLECTOMY       The patient has a family history of  Family History   Problem Relation Age of Onset   • Hypertension Father    • Osteoarthritis Father    • Osteoporosis Father      Social History  "    Socioeconomic History   • Marital status: Single     Spouse name: Not on file   • Number of children: Not on file   • Years of education: Not on file   • Highest education level: Not on file   Tobacco Use   • Smoking status: Never Smoker   • Smokeless tobacco: Never Used   Vaping Use   • Vaping Use: Never used   Substance and Sexual Activity   • Alcohol use: Yes     Alcohol/week: 12.0 standard drinks     Types: 12 Cans of beer per week   • Drug use: No   • Sexual activity: Defer       Review of Systems   Genitourinary: Positive for dysuria. Negative for discharge, frequency, penile pain, erectile dysfunction and urgency.     Visit Vitals  /78 (BP Location: Left arm, Patient Position: Sitting, Cuff Size: Adult)   Pulse 67   Temp 98.2 °F (36.8 °C) (Temporal)   Resp 16   Ht 175.3 cm (69\")   Wt 84.4 kg (186 lb)   SpO2 98%   BMI 27.47 kg/m²       Current Outpatient Medications:   •  amphetamine-dextroamphetamine (Adderall) 20 MG tablet, Take 1 tablet by mouth 2 (Two) Times a Day., Disp: 60 tablet, Rfl: 0  •  betamethasone, augmented, (DIPROLENE) 0.05 % gel, Apply 1 application topically to the appropriate area as directed As Needed., Disp: , Rfl:   •  fluticasone (FLONASE) 50 MCG/ACT nasal spray, FLUTICASONE PROPIONATE 50 MCG/ACT SUSP, Disp: , Rfl:   •  meloxicam (MOBIC) 15 MG tablet, Take 15 mg by mouth Daily., Disp: , Rfl:   •  sulfamethoxazole-trimethoprim (Bactrim DS) 800-160 MG per tablet, Take 1 tablet by mouth 2 (Two) Times a Day for 10 days., Disp: 20 tablet, Rfl: 0    Objective   Physical Exam  Constitutional:       General: He is not in acute distress.     Appearance: Normal appearance. He is well-developed. He is not ill-appearing or diaphoretic.      Comments: Patient is in no distress, patient has normal voice and speech.  Normal respiratory effort.   HENT:      Head: Normocephalic and atraumatic.   Pulmonary:      Effort: Pulmonary effort is normal.   Musculoskeletal:      Cervical back: Normal " range of motion and neck supple.   Neurological:      General: No focal deficit present.      Mental Status: He is alert and oriented to person, place, and time. Mental status is at baseline.   Psychiatric:         Mood and Affect: Mood normal.       CMP    CMP 10/7/20 2/8/21   Glucose 85 82   BUN 24 (A) 18   Creatinine 1.03 0.87   eGFR Non African Am 76 92   Sodium 140 139   Potassium 4.5 4.4   Chloride 101 99   Calcium 10.0 9.6   Albumin 4.80    Total Bilirubin 0.5    Alkaline Phosphatase 54    AST (SGOT) 19    ALT (SGPT) 19    (A) Abnormal value                Assessment/Plan   Diagnoses and all orders for this visit:    1. Dysuria (Primary)  -     Urinalysis With Culture If Indicated - Urine, Clean Catch  -     Basic Metabolic Panel  -     sulfamethoxazole-trimethoprim (Bactrim DS) 800-160 MG per tablet; Take 1 tablet by mouth 2 (Two) Times a Day for 10 days.  Dispense: 20 tablet; Refill: 0  -     Chlamydia trachomatis, Neisseria gonorrhoeae, PCR - , Urine, Clean Catch    2. Urinary frequency  -     Urinalysis With Culture If Indicated - Urine, Clean Catch  -     Basic Metabolic Panel  -     sulfamethoxazole-trimethoprim (Bactrim DS) 800-160 MG per tablet; Take 1 tablet by mouth 2 (Two) Times a Day for 10 days.  Dispense: 20 tablet; Refill: 0  -     Chlamydia trachomatis, Neisseria gonorrhoeae, PCR - , Urine, Clean Catch  -     PSA DIAGNOSTIC; Future      I reviewed his symptoms and concerns.  I will be checking urinalysis and I will also check him for gonorrhea and chlamydia.  I will be also checking metabolic panel as he is concerned that his frequent urination might be a sign of elevated blood sugar, I reviewed his blood work results from his last fall and his blood sugar was within normal limits.  I discussed with him that his symptoms possibly might be due to inflammation in the prostate.  I will be starting him on Bactrim.  He is to monitor his symptoms and call us back if no resolution with the  antibiotic.        Return if symptoms worsen or fail to improve, for Recheck.    Requested Prescriptions     Signed Prescriptions Disp Refills   • sulfamethoxazole-trimethoprim (Bactrim DS) 800-160 MG per tablet 20 tablet 0     Sig: Take 1 tablet by mouth 2 (Two) Times a Day for 10 days.

## 2021-04-28 ENCOUNTER — TREATMENT (OUTPATIENT)
Dept: PHYSICAL THERAPY | Facility: CLINIC | Age: 56
End: 2021-04-28

## 2021-04-28 DIAGNOSIS — Z47.89 ORTHOPEDIC AFTERCARE: Primary | ICD-10-CM

## 2021-04-28 DIAGNOSIS — M75.122 COMPLETE TEAR OF LEFT ROTATOR CUFF, UNSPECIFIED WHETHER TRAUMATIC: ICD-10-CM

## 2021-04-28 DIAGNOSIS — M25.512 ACUTE PAIN OF LEFT SHOULDER: ICD-10-CM

## 2021-04-28 PROCEDURE — 97140 MANUAL THERAPY 1/> REGIONS: CPT | Performed by: PHYSICAL THERAPIST

## 2021-04-28 PROCEDURE — 97530 THERAPEUTIC ACTIVITIES: CPT | Performed by: PHYSICAL THERAPIST

## 2021-04-28 PROCEDURE — 97110 THERAPEUTIC EXERCISES: CPT | Performed by: PHYSICAL THERAPIST
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
## 2021-04-29 LAB
C TRACH RRNA SPEC QL NAA+PROBE: NEGATIVE
N GONORRHOEA RRNA SPEC QL NAA+PROBE: NEGATIVE

## 2021-04-30 ENCOUNTER — TREATMENT (OUTPATIENT)
Dept: PHYSICAL THERAPY | Facility: CLINIC | Age: 56
End: 2021-04-30

## 2021-04-30 DIAGNOSIS — M25.512 ACUTE PAIN OF LEFT SHOULDER: ICD-10-CM

## 2021-04-30 DIAGNOSIS — M75.122 COMPLETE TEAR OF LEFT ROTATOR CUFF, UNSPECIFIED WHETHER TRAUMATIC: ICD-10-CM

## 2021-04-30 DIAGNOSIS — Z47.89 ORTHOPEDIC AFTERCARE: Primary | ICD-10-CM

## 2021-04-30 PROCEDURE — 97140 MANUAL THERAPY 1/> REGIONS: CPT | Performed by: PHYSICAL THERAPIST

## 2021-04-30 PROCEDURE — 97110 THERAPEUTIC EXERCISES: CPT | Performed by: PHYSICAL THERAPIST

## 2021-04-30 PROCEDURE — 97530 THERAPEUTIC ACTIVITIES: CPT | Performed by: PHYSICAL THERAPIST

## 2021-04-30 PROCEDURE — 97535 SELF CARE MNGMENT TRAINING: CPT | Performed by: PHYSICAL THERAPIST

## 2021-04-30 NOTE — PROGRESS NOTES
Physical Therapy Daily Progress Note    VISIT#: 17    Subjective   Jovany Taveras reports: Shoulder is feeling good, is worried he won't be able to do his full job in 2 weeks though. ()       Objective     See Exercise, Manual, and Modality Logs for complete treatment.     Patient Education: can introduce bicep curls now, low weight, no sharp pains     Assessment/Plan   Pt able to increase resistance training to include bicep curls in safe ranges, no increase in pain during session despite pt c/o increased soreness with end range stretching and manual. Ed to add bicep curl with low weight to HEP, pt with good understanding. Also spent time discussing protocol changes, returning to work with carrying low loads, and improved mechanics with carrying weights.     Progress per Plan of Care        Timed:         Manual Therapy:    18     mins  46743;     Therapeutic Exercise:    13     mins  64862;     Neuromuscular Gilberto:        mins  69159;    Therapeutic Activity:     16     mins  56262;     Gait Training:           mins  65247;     Ultrasound:          mins  57230;    Ionto                                   mins   14723  Self Care                      9      mins   31117  Canalith Repos                   mins  50021    Un-Timed:  Electrical Stimulation:         mins  70584 ( );  Traction          mins 37496  Dry Needle                 ______ mins DRYNDL  Low Eval          Mins  17750  Mod Eval          Mins  90294  High Eval                            Mins  62700  Re-Eval                               mins  12252    Timed Treatment:   56   mins   Total Treatment:     56   mins    Elena Roca PT    Physical Therapist

## 2021-05-03 ENCOUNTER — TREATMENT (OUTPATIENT)
Dept: PHYSICAL THERAPY | Facility: CLINIC | Age: 56
End: 2021-05-03

## 2021-05-03 DIAGNOSIS — Z47.89 ORTHOPEDIC AFTERCARE: Primary | ICD-10-CM

## 2021-05-03 DIAGNOSIS — M75.122 COMPLETE TEAR OF LEFT ROTATOR CUFF, UNSPECIFIED WHETHER TRAUMATIC: ICD-10-CM

## 2021-05-03 DIAGNOSIS — M25.512 ACUTE PAIN OF LEFT SHOULDER: ICD-10-CM

## 2021-05-03 PROCEDURE — 97530 THERAPEUTIC ACTIVITIES: CPT | Performed by: PHYSICAL THERAPIST

## 2021-05-03 PROCEDURE — 97140 MANUAL THERAPY 1/> REGIONS: CPT | Performed by: PHYSICAL THERAPIST

## 2021-05-03 PROCEDURE — 97110 THERAPEUTIC EXERCISES: CPT | Performed by: PHYSICAL THERAPIST

## 2021-05-03 NOTE — PROGRESS NOTES
Physical Therapy Daily Progress Note    VISIT#: 18    Subjective   Jovany Taveras reports: Shoulder is doing ok, still stiff and cant get the last bit of motion back yet.     Objective     See Exercise, Manual, and Modality Logs for complete treatment.     Patient Education: inc weight with prone scap strength     Assessment/Plan  Continues with limitation at end ranges of rotation and abduction, flexion is full passively. Able to tolerate inc weight with bicep curl and row today with no issue. Additional stretching for IR/ER at end of session.     Progress per Plan of Care        Timed:         Manual Therapy:    16     mins  80987;     Therapeutic Exercise:    10     mins  81177;     Neuromuscular Gilberto:        mins  45081;    Therapeutic Activity:     10     mins  33418;     Gait Training:           mins  04709;     Ultrasound:          mins  44704;    Ionto                                   mins   48297  Self Care                            mins   10772  Canalith Repos                   mins  63695    Un-Timed:  Electrical Stimulation:         mins  54879 ( );  Traction          mins 28999  Dry Needle                 ______ mins DRYNDL  Low Eval          Mins  67702  Mod Eval          Mins  91152  High Eval                            Mins  94165  Re-Eval                               mins  24748    Timed Treatment:   36   mins   Total Treatment:     55   mins    Elena Roca PT    Physical Therapist

## 2021-05-06 DIAGNOSIS — F98.8 ATTENTION DEFICIT DISORDER (ADD) WITHOUT HYPERACTIVITY: ICD-10-CM

## 2021-05-06 RX ORDER — DEXTROAMPHETAMINE SACCHARATE, AMPHETAMINE ASPARTATE, DEXTROAMPHETAMINE SULFATE AND AMPHETAMINE SULFATE 5; 5; 5; 5 MG/1; MG/1; MG/1; MG/1
1 TABLET ORAL 2 TIMES DAILY
Qty: 60 TABLET | Refills: 0 | Status: SHIPPED | OUTPATIENT
Start: 2021-05-06 | End: 2021-06-07 | Stop reason: SDUPTHER

## 2021-05-06 NOTE — TELEPHONE ENCOUNTER
Caller: Jovany Taveras    Relationship: Self    Best call back number: [phone]    Medication needed:   Requested Prescriptions     Pending Prescriptions Disp Refills   • amphetamine-dextroamphetamine (Adderall) 20 MG tablet 60 tablet 0     Sig: Take 1 tablet by mouth 2 (Two) Times a Day.       When do you need the refill by: 05/06    What additional details did the patient provide when requesting the medication: HAS LESS THAN THREE DAYS REMAINING.     Does the patient have less than a 3 day supply:  [x] Yes  [] No    What is the patient's preferred pharmacy: [id number] [place] [id number] AT SEC OF [place] [address] & [facility] - [phone]  - [phone] FX

## 2021-05-07 ENCOUNTER — TREATMENT (OUTPATIENT)
Dept: PHYSICAL THERAPY | Facility: CLINIC | Age: 56
End: 2021-05-07

## 2021-05-07 DIAGNOSIS — M25.512 ACUTE PAIN OF LEFT SHOULDER: ICD-10-CM

## 2021-05-07 DIAGNOSIS — Z47.89 ORTHOPEDIC AFTERCARE: Primary | ICD-10-CM

## 2021-05-07 DIAGNOSIS — M75.122 COMPLETE TEAR OF LEFT ROTATOR CUFF, UNSPECIFIED WHETHER TRAUMATIC: ICD-10-CM

## 2021-05-07 PROCEDURE — 97530 THERAPEUTIC ACTIVITIES: CPT | Performed by: PHYSICAL THERAPIST

## 2021-05-07 PROCEDURE — 97140 MANUAL THERAPY 1/> REGIONS: CPT | Performed by: PHYSICAL THERAPIST

## 2021-05-07 PROCEDURE — 97110 THERAPEUTIC EXERCISES: CPT | Performed by: PHYSICAL THERAPIST

## 2021-05-07 NOTE — PROGRESS NOTES
Physical Therapy Daily Progress Note    VISIT#: 19    Subjective   Jovany Taveras reports: No changes, feeling good. [facility] with no pain.       Objective     See Exercise, Manual, and Modality Logs for complete treatment.   ROM: Flex 150, abduction 158, IR T10, ER C4   Patient Education: additional strength at end range     Assessment/Plan  Able to progress to end range strength with dowel chidi AAROM and isometric hold at end range. No pain during session outside of passive mobility, however pt showing improved motion in all directions.     Progress per Plan of Care        Timed:         Manual Therapy:    16     mins  95998;     Therapeutic Exercise:    10     mins  84896;     Neuromuscular Gilberto:        mins  49839;    Therapeutic Activity:     14     mins  88924;     Gait Training:           mins  43425;     Ultrasound:          mins  02091;    Ionto                                   mins   44898  Self Care                            mins   39047  Canalith Repos                   mins  42057    Un-Timed:  Electrical Stimulation:         mins  81269 ( );  Traction          mins 00380  Dry Needle                 ______ mins DRYNDL  Low Eval          Mins  32292  Mod Eval          Mins  72499  High Eval                            Mins  05525  Re-Eval                               mins  38700    Timed Treatment:   40   mins   Total Treatment:     40   mins    Elena Roca, PT    Physical Therapist

## 2021-05-13 ENCOUNTER — TREATMENT (OUTPATIENT)
Dept: PHYSICAL THERAPY | Facility: CLINIC | Age: 56
End: 2021-05-13

## 2021-05-13 ENCOUNTER — OFFICE VISIT (OUTPATIENT)
Dept: ORTHOPEDIC SURGERY | Facility: CLINIC | Age: 56
End: 2021-05-13

## 2021-05-13 VITALS
SYSTOLIC BLOOD PRESSURE: 125 MMHG | HEART RATE: 69 BPM | HEIGHT: 69 IN | WEIGHT: 186 LBS | DIASTOLIC BLOOD PRESSURE: 73 MMHG | BODY MASS INDEX: 27.55 KG/M2

## 2021-05-13 DIAGNOSIS — Z47.89 ORTHOPEDIC AFTERCARE: Primary | ICD-10-CM

## 2021-05-13 DIAGNOSIS — M25.512 ACUTE PAIN OF LEFT SHOULDER: ICD-10-CM

## 2021-05-13 DIAGNOSIS — M75.122 COMPLETE TEAR OF LEFT ROTATOR CUFF, UNSPECIFIED WHETHER TRAUMATIC: ICD-10-CM

## 2021-05-13 PROCEDURE — 99024 POSTOP FOLLOW-UP VISIT: CPT | Performed by: ORTHOPAEDIC SURGERY

## 2021-05-13 PROCEDURE — 97140 MANUAL THERAPY 1/> REGIONS: CPT | Performed by: PHYSICAL THERAPIST

## 2021-05-13 PROCEDURE — 97164 PT RE-EVAL EST PLAN CARE: CPT | Performed by: PHYSICAL THERAPIST

## 2021-05-13 NOTE — PROGRESS NOTES
Physical Therapy Re-Evaluation and Plan of Care    Patient: Jovany Taveras   : 1965  Diagnosis/ICD-10 Code:  Orthopedic aftercare [Z47.89]  Referring practitioner: Wan Mccormick, *  Date of Initial Visit: 2021  Today's Date: 2021  Patient seen for 20 sessions           Subjective Questionnaire: QuickDASH: 4.5% impairment, Work Module 50% impairment, Sports Module 25% impairment       Subjective   Reporting being able to resume some exercises classes again that are using his arm more, however has not been lifting much. Sees MD today, and is not sure how he will be able to go back to work as a  yet. Will discuss this with MD today.     Objective   ROM flex 130, abd 160, IR L3, ER T2  MMT flex 4-/5 slight pain, abduction 4/5, IR and ER 4/5, bicep and tricep 4+/5     Assessment & Plan     Assessment  Impairments: abnormal or restricted ROM, activity intolerance, impaired physical strength and lacks appropriate home exercise program  Assessment details: Pt has attended 20 OPPT sessions thus far following (L) bankart repair by Dr. Mccormick on 2021. He reports 4.5% impairment on QuickDASH, 50% impairment on work module, and 25% impairment on sports module. He shows improved function in all areas, however remains limited in end range motion and strength, which limits his functional mobility to participate in recreational activities as well as safely resume his job as a . Recommend continued skilled OPPT to address remaining limitations, pt in agreement.   Functional Limitations: carrying objects, lifting, pushing, uncomfortable because of pain, reaching behind back and reaching overhead  Goals  Plan Goals: Plan Goals: STG (3 weeks)  1) Will be independent in home program for basic active assistive range of motion of the (L) shoulder - MET   2) Will demonstrate basic understanding of his condition and his role in treatment progression - MET   3) will tolerate initiation  of isometrics or resistive strengthening of his (L) shoulder. - MET 5/13  4) will demonstrate moderate improvement in tolerance to daily activity as evidenced by QuickDASH: score of 50% or less - MET 5/13    LTG (12 weeks)  1) independent in home program for self-management of his condition - progressing  2) will demonstrate significant improvement in tolerance to daily activity as evidenced by QuickDASH: score of 15% or less - MET 5/13  3) will demonstrate AROM of the (L) shoulder flexion and abduction of 170 degrees or greater to allow for full functional use of the (L) upper extremity to allow for transition to advanced strengthening program. - progressing   4) will demonstrate AROM of the (L shoulder ER of 85 degrees or greater to allow for full functional use of the (L) upper extremity to allow for transition to advanced strengthening program. - MET 5/13  5) Will demonstrate MMT of the (L) shoulder of 4+/5 or greater in all major planes to allow for progressing to return to sports and work activities - progressing   6) will demonstrate significant improvement in tolerance to work activity as evidenced by QuickDASH: Work score of 20% or less - progressing  7) will demonstrate significant improvement in tolerance to sports activity as evidenced by QuickDASH: sport score of 20% or less - progressing     Plan  Therapy options: will be seen for skilled physical therapy services  Planned modality interventions: cryotherapy, dry needling, electrical stimulation/Russian stimulation and thermotherapy (hydrocollator packs)  Planned therapy interventions: manual therapy, neuromuscular re-education, soft tissue mobilization, spinal/joint mobilization, strengthening, stretching, therapeutic activities, home exercise program, functional ROM exercises, flexibility, balance/weight-bearing training and body mechanics training  Frequency: 2x week (1-2x/week)  Duration in visits: 20  Duration in weeks: 15  Treatment plan  discussed with: patient        History # of Personal Factors and/or Comorbidities: LOW (0)  Examination of Body System(s): # of elements: LOW (1-2)  Clinical Presentation: STABLE   Clinical Decision Making: LOW     Timed:         Manual Therapy:    15     mins  96550;     Therapeutic Exercise:         mins  70632;     Neuromuscular Gilberto:        mins  20243;    Therapeutic Activity:          mins  29052;     Gait Training:           mins  25949;     Ultrasound:          mins  87837;    Ionto                                   mins   19867  Self Care                            mins   95438  Canalith Repos         mins 57874      Un-Timed:  Electrical Stimulation:         mins  51217 ( );  Traction          mins 16443  Dry Needle                 ______ mins DRYNDL  Low Eval          Mins  46549  Mod Eval          Mins  49335  High Eval                            Mins  32846  Re-Eval                           10    mins  89939        Timed Treatment:   15   mins   Total Treatment:     55   mins    PT SIGNATURE: Elena Roca PT   DATE TREATMENT INITIATED: 5/13/2021    Re-Certification  Certification Period: 8/11/2021  I certify that the therapy services are furnished while this patient is under my care.  The services outlined above are required by this patient, and will be reviewed every 90 days.     PHYSICIAN: Wan Mccormick MD      DATE:     Please sign and return via fax to [phone].. Thank you, [facility].

## 2021-05-13 NOTE — PROGRESS NOTES
"     Patient ID: Jovany Taveras is a 56 y.o. male.  2/19/21 left shoulder arthroscopy supraspinatus and Bankart repair  Pain mild, has been doing some push-ups      Objective:    /73   Pulse 69   Ht 175.3 cm (69\")   Wt 84.4 kg (186 lb)   BMI 27.47 kg/m²     Physical Examination:  Left shoulder demonstrates healed incisions, passive elevation 170 abduction 130 external rotation 30 internal rotation S1 with intact repair       Imaging:      Assessment:  Doing well after cuff and Bankart repair    Plan:  Finish out formal physical therapy, discussed restrictions such as push-ups and pull up and bench press avoidance but otherwise okay for light overhead strengthening.  See me in 6 weeks  Return to work with restrictions in 10 days and without restrictions in a month    Procedures  "

## 2021-05-25 ENCOUNTER — TREATMENT (OUTPATIENT)
Dept: PHYSICAL THERAPY | Facility: CLINIC | Age: 56
End: 2021-05-25

## 2021-05-25 DIAGNOSIS — M25.512 ACUTE PAIN OF LEFT SHOULDER: ICD-10-CM

## 2021-05-25 DIAGNOSIS — M75.122 COMPLETE TEAR OF LEFT ROTATOR CUFF, UNSPECIFIED WHETHER TRAUMATIC: ICD-10-CM

## 2021-05-25 DIAGNOSIS — Z47.89 ORTHOPEDIC AFTERCARE: Primary | ICD-10-CM

## 2021-05-25 PROCEDURE — 97140 MANUAL THERAPY 1/> REGIONS: CPT | Performed by: PHYSICAL THERAPIST

## 2021-05-25 PROCEDURE — 97530 THERAPEUTIC ACTIVITIES: CPT | Performed by: PHYSICAL THERAPIST

## 2021-05-25 PROCEDURE — 97110 THERAPEUTIC EXERCISES: CPT | Performed by: PHYSICAL THERAPIST

## 2021-05-25 NOTE — PROGRESS NOTES
Physical Therapy Daily Progress Note    VISIT#: 21/40 in POC     Subjective   Jovany Taveras reports: Is back at work, is doing fine but doesn't think he has gained any motion since he was last in therapy. MD told him to stop doing pushups at workout class.       Objective     See Exercise, Manual, and Modality Logs for complete treatment.     Patient Education: body weight training with TRX straps vs pushups     Assessment/Plan  Pt with full passive motion in all directions post manual. Able to perform additional scap strength with TRX and with body blade today, no sharp pains, however pt moderately fatigued at end of session.     Progress per Plan of Care        Timed:         Manual Therapy:    15     mins  29493;     Therapeutic Exercise:    12     mins  37984;     Neuromuscular Gilberto:        mins  15462;    Therapeutic Activity:     18     mins  76094;     Gait Training:           mins  89845;     Ultrasound:          mins  40070;    Ionto                                   mins   01897  Self Care                            mins   18025  Canalith Repos                   mins  56118    Un-Timed:  Electrical Stimulation:         mins  48142 ( );  Traction          mins 50655  Dry Needle                 ______ mins DRYNDL  Low Eval          Mins  81058  Mod Eval          Mins  77331  High Eval                            Mins  12193  Re-Eval                               mins  97565    Timed Treatment:   45   mins   Total Treatment:     45   mins    Elena Roca, PT    Physical Therapist

## 2021-05-27 ENCOUNTER — TREATMENT (OUTPATIENT)
Dept: PHYSICAL THERAPY | Facility: CLINIC | Age: 56
End: 2021-05-27

## 2021-05-27 DIAGNOSIS — Z47.89 ORTHOPEDIC AFTERCARE: Primary | ICD-10-CM

## 2021-05-27 DIAGNOSIS — M25.512 ACUTE PAIN OF LEFT SHOULDER: ICD-10-CM

## 2021-05-27 DIAGNOSIS — M75.122 COMPLETE TEAR OF LEFT ROTATOR CUFF, UNSPECIFIED WHETHER TRAUMATIC: ICD-10-CM

## 2021-05-27 PROCEDURE — 97530 THERAPEUTIC ACTIVITIES: CPT | Performed by: PHYSICAL THERAPIST

## 2021-05-27 PROCEDURE — 97110 THERAPEUTIC EXERCISES: CPT | Performed by: PHYSICAL THERAPIST

## 2021-05-27 PROCEDURE — 97140 MANUAL THERAPY 1/> REGIONS: CPT | Performed by: PHYSICAL THERAPIST

## 2021-06-01 ENCOUNTER — TREATMENT (OUTPATIENT)
Dept: PHYSICAL THERAPY | Facility: CLINIC | Age: 56
End: 2021-06-01

## 2021-06-01 DIAGNOSIS — M25.512 ACUTE PAIN OF LEFT SHOULDER: ICD-10-CM

## 2021-06-01 DIAGNOSIS — M75.122 COMPLETE TEAR OF LEFT ROTATOR CUFF, UNSPECIFIED WHETHER TRAUMATIC: ICD-10-CM

## 2021-06-01 DIAGNOSIS — Z47.89 ORTHOPEDIC AFTERCARE: Primary | ICD-10-CM

## 2021-06-01 PROCEDURE — 97140 MANUAL THERAPY 1/> REGIONS: CPT | Performed by: PHYSICAL THERAPIST

## 2021-06-01 PROCEDURE — 97110 THERAPEUTIC EXERCISES: CPT | Performed by: PHYSICAL THERAPIST

## 2021-06-01 PROCEDURE — 97530 THERAPEUTIC ACTIVITIES: CPT | Performed by: PHYSICAL THERAPIST

## 2021-06-01 NOTE — PROGRESS NOTES
Physical Therapy Daily Progress Note    VISIT#: 23    Subjective   Jovany Taveras reports: Doing well, has stopped doing his pushups and planks at his workout class.       Objective     See Exercise, Manual, and Modality Logs for complete treatment.   Positive for empty can test, positive for pain with shoulder abduction MMT   Patient Education: inflammed tendons, changes to exercise program at [facility]     Assessment/Plan  Pt with increased pain at supra and infraspinatus today with testing, educated pt to tone down workouts at [facility] as he shows some inflammation at these tendons, no further injury suspected at this time. KT tape for stability and pain relief at end of session. Pt with good understanding regarding precautions.     Progress per Plan of Care        Timed:         Manual Therapy:    14     mins  15462;     Therapeutic Exercise:    10     mins  10390;     Neuromuscular Gilberto:        mins  56405;    Therapeutic Activity:     16     mins  56402;     Gait Training:           mins  91102;     Ultrasound:          mins  08573;    Ionto                                   mins   52183  Self Care                            mins   93015  Canalith Repos                   mins  03769    Un-Timed:  Electrical Stimulation:         mins  93647 ( );  Traction          mins 60040  Dry Needle                 ______ mins DRYNDL  Low Eval          Mins  86190  Mod Eval          Mins  94421  High Eval                            Mins  49819  Re-Eval                               mins  38584    Timed Treatment:   40   mins   Total Treatment:     40   mins    Elena Roca PT    Physical Therapist

## 2021-06-02 RX ORDER — MELOXICAM 15 MG/1
TABLET ORAL
Qty: 90 TABLET | Refills: 1 | Status: SHIPPED | OUTPATIENT
Start: 2021-06-02 | End: 2021-09-08 | Stop reason: SDUPTHER

## 2021-06-03 ENCOUNTER — TREATMENT (OUTPATIENT)
Dept: PHYSICAL THERAPY | Facility: CLINIC | Age: 56
End: 2021-06-03

## 2021-06-03 DIAGNOSIS — Z47.89 ORTHOPEDIC AFTERCARE: Primary | ICD-10-CM

## 2021-06-03 DIAGNOSIS — M75.122 COMPLETE TEAR OF LEFT ROTATOR CUFF, UNSPECIFIED WHETHER TRAUMATIC: ICD-10-CM

## 2021-06-03 DIAGNOSIS — M25.512 ACUTE PAIN OF LEFT SHOULDER: ICD-10-CM

## 2021-06-03 PROCEDURE — 97014 ELECTRIC STIMULATION THERAPY: CPT | Performed by: PHYSICAL THERAPIST

## 2021-06-03 PROCEDURE — 97140 MANUAL THERAPY 1/> REGIONS: CPT | Performed by: PHYSICAL THERAPIST

## 2021-06-03 NOTE — PROGRESS NOTES
Physical Therapy Daily Progress Note    VISIT#: 24    Subjective   Jovany Taveras reports: Hasnt been doing as much with his shoulder since last time. Not hurting today.       Objective     See Exercise, Manual, and Modality Logs for complete treatment.   Empty can - positive   MMT flex, abd, ER, IR non painful 4+/5   Patient Education: workout changes     Assessment/Plan  Continued irritation of RC tendons today, therefore no progressions. Max education given on importance of avoiding chest and overhead strength with weights until RC tendon is not inflamed, pt with good understanding. IFC and KT tape for stability utilized today.     Progress per Plan of Care            Timed:         Manual Therapy:    23     mins  55690;     Therapeutic Exercise:    5     mins  75619;     Neuromuscular Gilberto:        mins  64035;    Therapeutic Activity:          mins  99402;     Gait Training:           mins  07573;     Ultrasound:          mins  36728;    Ionto                                   mins   65818  Self Care                            mins   28297  Canalith Repos                   mins  60096    Un-Timed:  Electrical Stimulation:    15     mins  50517 ( );  Traction          mins 23934  Dry Needle                 ______ mins DRYNDL  Low Eval          Mins  85917  Mod Eval          Mins  82364  High Eval                            Mins  22586  Re-Eval                               mins  69910    Timed Treatment:   28   mins   Total Treatment:     43   mins    Elena Roca PT    Physical Therapist

## 2021-06-07 DIAGNOSIS — F98.8 ATTENTION DEFICIT DISORDER (ADD) WITHOUT HYPERACTIVITY: ICD-10-CM

## 2021-06-07 RX ORDER — DEXTROAMPHETAMINE SACCHARATE, AMPHETAMINE ASPARTATE, DEXTROAMPHETAMINE SULFATE AND AMPHETAMINE SULFATE 5; 5; 5; 5 MG/1; MG/1; MG/1; MG/1
1 TABLET ORAL 2 TIMES DAILY
Qty: 60 TABLET | Refills: 0 | Status: SHIPPED | OUTPATIENT
Start: 2021-06-07 | End: 2021-07-07 | Stop reason: SDUPTHER

## 2021-06-08 ENCOUNTER — TREATMENT (OUTPATIENT)
Dept: PHYSICAL THERAPY | Facility: CLINIC | Age: 56
End: 2021-06-08

## 2021-06-08 DIAGNOSIS — M75.122 COMPLETE TEAR OF LEFT ROTATOR CUFF, UNSPECIFIED WHETHER TRAUMATIC: ICD-10-CM

## 2021-06-08 DIAGNOSIS — M25.512 ACUTE PAIN OF LEFT SHOULDER: ICD-10-CM

## 2021-06-08 DIAGNOSIS — Z47.89 ORTHOPEDIC AFTERCARE: Primary | ICD-10-CM

## 2021-06-08 PROCEDURE — 97110 THERAPEUTIC EXERCISES: CPT | Performed by: PHYSICAL THERAPIST

## 2021-06-08 PROCEDURE — 97140 MANUAL THERAPY 1/> REGIONS: CPT | Performed by: PHYSICAL THERAPIST

## 2021-06-08 PROCEDURE — 97014 ELECTRIC STIMULATION THERAPY: CPT | Performed by: PHYSICAL THERAPIST

## 2021-06-08 NOTE — PROGRESS NOTES
Physical Therapy Daily Progress Note    VISIT#: 25    Subjective   Jovany Taveras reports: Has not been doing the overhead strength since last session. Shoulder seems to be feeling better, is able to sleep on the left side again.       Objective     See Exercise, Manual, and Modality Logs for complete treatment.   Positive empty can, positive for pain with abduction MMT     Assessment/Plan   Pt able to progress with AAROM using weighted bar today with no pain despite continuing to show positive signs for RC inflammation. Continued with KT tape and pain relieving modalities today. No increase in pain during session.     Progress per Plan of Care        Timed:         Manual Therapy:    16     mins  47506;     Therapeutic Exercise:    10     mins  69735;     Neuromuscular Gilberto:       mins  01831;    Therapeutic Activity:          mins  69999;     Gait Training:           mins  00071;     Ultrasound:          mins  90078;    Ionto                                   mins   56633  Self Care                            mins   44279  Canalith Repos                   mins  13974    Un-Timed:  Electrical Stimulation:    15     mins  02354 ( );  Traction          mins 05954  Dry Needle                 ______ mins DRYNDL  Low Eval          Mins  86227  Mod Eval          Mins  01304  High Eval                            Mins  82105  Re-Eval                               mins  20117    Timed Treatment:   26   mins   Total Treatment:     45   mins    Elena Roca, PT    Physical Therapist

## 2021-06-10 ENCOUNTER — TREATMENT (OUTPATIENT)
Dept: PHYSICAL THERAPY | Facility: CLINIC | Age: 56
End: 2021-06-10

## 2021-06-10 DIAGNOSIS — Z47.89 ORTHOPEDIC AFTERCARE: Primary | ICD-10-CM

## 2021-06-10 DIAGNOSIS — M25.512 ACUTE PAIN OF LEFT SHOULDER: ICD-10-CM

## 2021-06-10 DIAGNOSIS — M75.122 COMPLETE TEAR OF LEFT ROTATOR CUFF, UNSPECIFIED WHETHER TRAUMATIC: ICD-10-CM

## 2021-06-10 PROCEDURE — 97014 ELECTRIC STIMULATION THERAPY: CPT | Performed by: PHYSICAL THERAPIST

## 2021-06-10 PROCEDURE — 97530 THERAPEUTIC ACTIVITIES: CPT | Performed by: PHYSICAL THERAPIST

## 2021-06-10 PROCEDURE — 97110 THERAPEUTIC EXERCISES: CPT | Performed by: PHYSICAL THERAPIST

## 2021-06-10 PROCEDURE — 97140 MANUAL THERAPY 1/> REGIONS: CPT | Performed by: PHYSICAL THERAPIST

## 2021-06-10 NOTE — PROGRESS NOTES
Physical Therapy Daily Progress Note    VISIT#: 26    Subjective   Jovany Taveras reports: Shoulder is still a little sore but not too bad.       Objective     See Exercise, Manual, and Modality Logs for complete treatment.     Patient Education: continue to avoid overhead strength til next visit     Assessment/Plan  Pt with improved pain levels this session, however continues to show tenderness with abduction MMT and empty can special test. Recommend continue to avoid overhead strength, pt with good understanding.     Progress per Plan of Care            Timed:         Manual Therapy:    14     mins  41549;     Therapeutic Exercise:    10     mins  67539;     Neuromuscular Gilberto:        mins  58813;    Therapeutic Activity:     13    mins  45939;     Gait Training:           mins  04481;     Ultrasound:          mins  96100;    Ionto                                   mins   18910  Self Care                            mins   61944  Canalith Repos                   mins  63891    Un-Timed:  Electrical Stimulation:    15     mins  13527 ( );  Traction          mins 46413  Dry Needle                 ______ mins DRYNDL  Low Eval          Mins  70725  Mod Eval          Mins  87046  High Eval                            Mins  37394  Re-Eval                               mins  24262    Timed Treatment:   37   mins   Total Treatment:     52   mins    Elena Roca, PT    Physical Therapist

## 2021-06-15 ENCOUNTER — TREATMENT (OUTPATIENT)
Dept: PHYSICAL THERAPY | Facility: CLINIC | Age: 56
End: 2021-06-15

## 2021-06-15 DIAGNOSIS — M25.512 ACUTE PAIN OF LEFT SHOULDER: ICD-10-CM

## 2021-06-15 DIAGNOSIS — M75.122 COMPLETE TEAR OF LEFT ROTATOR CUFF, UNSPECIFIED WHETHER TRAUMATIC: ICD-10-CM

## 2021-06-15 DIAGNOSIS — Z47.89 ORTHOPEDIC AFTERCARE: Primary | ICD-10-CM

## 2021-06-15 PROCEDURE — 97140 MANUAL THERAPY 1/> REGIONS: CPT | Performed by: PHYSICAL THERAPIST

## 2021-06-15 PROCEDURE — 97014 ELECTRIC STIMULATION THERAPY: CPT | Performed by: PHYSICAL THERAPIST

## 2021-06-15 PROCEDURE — 97110 THERAPEUTIC EXERCISES: CPT | Performed by: PHYSICAL THERAPIST

## 2021-06-15 NOTE — PROGRESS NOTES
Physical Therapy Daily Progress Note and MD note     VISIT#: 27    Subjective   Jovany Taveras reports: Nothing new, still been avoiding body pump exercise classes, but did do a lot of work at home today.       Objective     See Exercise, Manual, and Modality Logs for complete treatment.   Positive empty can and positive pain with shoulder flexion MMT   Patient Education: add resistance bands back to HEP - lat pull down, row, and shoulder ext     Assessment/Plan  Pt continues with pain during empty can and flexion MMT today. Continued ed to avoid overstressing anterior shoulder, along with importance of upper back strength. Pt with good understanding. Discussed decreasing to 1x/week to continue working toward full work ability and full sport return. Pt agreeable.     Progress per Plan of Care        Timed:         Manual Therapy:    14     mins  55115;     Therapeutic Exercise:    16     mins  96532;     Neuromuscular Gilberto:        mins  91609;    Therapeutic Activity:          mins  41913;     Gait Training:           mins  98717;     Ultrasound:          mins  71790;    Ionto                                   mins   11236  Self Care                            mins   24020  Canalith Repos                   mins  51574    Un-Timed:  Electrical Stimulation:    15     mins  43914 ( );  Traction          mins 45737  Dry Needle                 ______ mins DRYNDL  Low Eval          Mins  48082  Mod Eval          Mins  81777  High Eval                            Mins  76341  Re-Eval                               mins  13164    Timed Treatment:   30   mins   Total Treatment:     47   mins    Elena Roca PT    Physical Therapist

## 2021-06-17 ENCOUNTER — OFFICE VISIT (OUTPATIENT)
Dept: ORTHOPEDIC SURGERY | Facility: CLINIC | Age: 56
End: 2021-06-17

## 2021-06-17 VITALS
HEIGHT: 69 IN | BODY MASS INDEX: 27.55 KG/M2 | DIASTOLIC BLOOD PRESSURE: 75 MMHG | SYSTOLIC BLOOD PRESSURE: 121 MMHG | WEIGHT: 186 LBS | HEART RATE: 61 BPM

## 2021-06-17 DIAGNOSIS — M75.122 COMPLETE TEAR OF LEFT ROTATOR CUFF, UNSPECIFIED WHETHER TRAUMATIC: Primary | ICD-10-CM

## 2021-06-17 PROCEDURE — 99212 OFFICE O/P EST SF 10 MIN: CPT | Performed by: ORTHOPAEDIC SURGERY

## 2021-06-17 NOTE — PROGRESS NOTES
"     Patient ID: Jovany Taveras is a 56 y.o. male.  Left shoulder pain  2/19/21 left shoulder arthroscopy supraspinatus and Bankart repair  Pain minimal    Review of Systems:    Left shoulder pain improving    Objective:    /75   Pulse 61   Ht 175.3 cm (69\")   Wt 84.4 kg (186 lb)   BMI 27.47 kg/m²     Physical Examination:  Left shoulder demonstrates healed incisions.  Passive elevation 170 abduction 140 external rotation 50 internal rotation S1 with a negative Speed, Revloc, supraspinatus test       Imaging:       Assessment:    Doing well after cuff repair    Plan:   Finish out formal therapy, gradual activity as tolerated see me as needed      Procedures          Disclaimer: Please note that areas of this note were completed with computer voice recognition software.  Quite often unanticipated grammatical, syntax, homophones, and other interpretive errors are inadvertently transcribed by the computer software. Please excuse any errors that have escaped final proofreading.  "

## 2021-06-24 ENCOUNTER — TREATMENT (OUTPATIENT)
Dept: PHYSICAL THERAPY | Facility: CLINIC | Age: 56
End: 2021-06-24

## 2021-06-24 DIAGNOSIS — M25.512 ACUTE PAIN OF LEFT SHOULDER: ICD-10-CM

## 2021-06-24 DIAGNOSIS — M75.122 COMPLETE TEAR OF LEFT ROTATOR CUFF, UNSPECIFIED WHETHER TRAUMATIC: ICD-10-CM

## 2021-06-24 DIAGNOSIS — Z47.89 ORTHOPEDIC AFTERCARE: Primary | ICD-10-CM

## 2021-06-24 PROCEDURE — 97530 THERAPEUTIC ACTIVITIES: CPT | Performed by: PHYSICAL THERAPIST

## 2021-06-24 PROCEDURE — 97140 MANUAL THERAPY 1/> REGIONS: CPT | Performed by: PHYSICAL THERAPIST

## 2021-06-24 NOTE — PROGRESS NOTES
Physical Therapy Daily Progress Note    VISIT#: 28    Subjective   Jovany Taveras reports: Saw Dr. Mccormick who told him he was doing well and did not need to come back to see him again unless something changes in the next few months, no restrictions. Pt to see MD at work soon as well to return to full duty. Pt with c/o of lacking IR mobility to reach behind his back fully.       Objective     See Exercise, Manual, and Modality Logs for complete treatment.   Empty can - negative  MMT: 4+/5 flex and abd, no pain   Patient Education: capsule stretching, gentle body weight ex to start higher level strengthening     Assessment/Plan  Pt able to complete initial body weight training today to include incline push through hand/forearm alternating sides with no pain, as well as body blade ex progressions. Mod fatigue at end of session, however pt declined modalities. Negative special testing and MMT noted today.     Progress per Plan of Care            Timed:         Manual Therapy:    13     mins  23560;     Therapeutic Exercise:         mins  84270;     Neuromuscular Gilberto:        mins  48971;    Therapeutic Activity:     10     mins  53024;     Gait Training:           mins  27003;     Ultrasound:          mins  69121;    Ionto                                   mins   00462  Self Care                            mins   57797  Canalith Repos                   mins  44514    Un-Timed:  Electrical Stimulation:         mins  78201 ( );  Traction          mins 40224  Dry Needle                 ______ mins DRYNDL  Low Eval          Mins  60030  Mod Eval          Mins  15964  High Eval                            Mins  02937  Re-Eval                               mins  03798    Timed Treatment:   23   mins   Total Treatment:     45   mins    Elena Roca, PT    Physical Therapist

## 2021-07-02 ENCOUNTER — DOCUMENTATION (OUTPATIENT)
Dept: PHYSICAL THERAPY | Facility: CLINIC | Age: 56
End: 2021-07-02

## 2021-07-02 NOTE — PROGRESS NOTES
Discharge Summary  Discharge Summary from Physical Therapy Report      Dates  PT visit: 3/5/21 -6/24/21  Number of Visits: 28    Goals: All Met    Discharge Plan: Continue with current home exercise program as instructed    Comments : Pt missed last appt, PT called pt to check in and pt reporting he forgot appt but he is doing well. Does not have much pain at all, is doing his BodyPump class at the [facility] with 25# weights, and is back at work but is just on light duty right now. Pt appropriate for DC at this time. Pt not present for discharge, therefore no functional measures taken. See last note for most updated information.       Date of Discharge 7/2/21        Elena Roca, PT  Physical Therapist

## 2021-07-07 DIAGNOSIS — F98.8 ATTENTION DEFICIT DISORDER (ADD) WITHOUT HYPERACTIVITY: ICD-10-CM

## 2021-07-07 RX ORDER — DEXTROAMPHETAMINE SACCHARATE, AMPHETAMINE ASPARTATE, DEXTROAMPHETAMINE SULFATE AND AMPHETAMINE SULFATE 5; 5; 5; 5 MG/1; MG/1; MG/1; MG/1
1 TABLET ORAL 2 TIMES DAILY
Qty: 60 TABLET | Refills: 0 | Status: SHIPPED | OUTPATIENT
Start: 2021-07-07 | End: 2021-08-06 | Stop reason: SDUPTHER

## 2021-08-06 DIAGNOSIS — F98.8 ATTENTION DEFICIT DISORDER (ADD) WITHOUT HYPERACTIVITY: ICD-10-CM

## 2021-08-06 RX ORDER — DEXTROAMPHETAMINE SACCHARATE, AMPHETAMINE ASPARTATE, DEXTROAMPHETAMINE SULFATE AND AMPHETAMINE SULFATE 5; 5; 5; 5 MG/1; MG/1; MG/1; MG/1
1 TABLET ORAL 2 TIMES DAILY
Qty: 60 TABLET | Refills: 0 | Status: SHIPPED | OUTPATIENT
Start: 2021-08-06 | End: 2021-09-08 | Stop reason: SDUPTHER

## 2021-09-08 DIAGNOSIS — F98.8 ATTENTION DEFICIT DISORDER (ADD) WITHOUT HYPERACTIVITY: ICD-10-CM

## 2021-09-08 RX ORDER — MELOXICAM 15 MG/1
15 TABLET ORAL DAILY
Qty: 90 TABLET | Refills: 1 | Status: SHIPPED | OUTPATIENT
Start: 2021-09-08 | End: 2022-03-08 | Stop reason: SDUPTHER

## 2021-09-08 RX ORDER — DEXTROAMPHETAMINE SACCHARATE, AMPHETAMINE ASPARTATE, DEXTROAMPHETAMINE SULFATE AND AMPHETAMINE SULFATE 5; 5; 5; 5 MG/1; MG/1; MG/1; MG/1
1 TABLET ORAL 2 TIMES DAILY
Qty: 60 TABLET | Refills: 0 | Status: SHIPPED | OUTPATIENT
Start: 2021-09-08 | End: 2021-10-08 | Stop reason: SDUPTHER

## 2021-10-08 DIAGNOSIS — F98.8 ATTENTION DEFICIT DISORDER (ADD) WITHOUT HYPERACTIVITY: ICD-10-CM

## 2021-10-08 RX ORDER — DEXTROAMPHETAMINE SACCHARATE, AMPHETAMINE ASPARTATE, DEXTROAMPHETAMINE SULFATE AND AMPHETAMINE SULFATE 5; 5; 5; 5 MG/1; MG/1; MG/1; MG/1
1 TABLET ORAL 2 TIMES DAILY
Qty: 60 TABLET | Refills: 0 | Status: SHIPPED | OUTPATIENT
Start: 2021-10-08 | End: 2021-11-08 | Stop reason: SDUPTHER

## 2021-10-12 NOTE — TELEPHONE ENCOUNTER
I reviewed his chart and my records indicate that his last colonoscopy was in 9/2018, it was done by Dr. Betts and 5-year follow-up was recommended.  I do not mind to give her a new referral for the colonoscopy if needed, but again according my records he is not due until 9/2023.  Please clarify with the patient.  Thank you.   Private car

## 2021-10-20 ENCOUNTER — LAB (OUTPATIENT)
Dept: FAMILY MEDICINE CLINIC | Facility: CLINIC | Age: 56
End: 2021-10-20

## 2021-10-20 ENCOUNTER — OFFICE VISIT (OUTPATIENT)
Dept: FAMILY MEDICINE CLINIC | Facility: CLINIC | Age: 56
End: 2021-10-20

## 2021-10-20 VITALS
BODY MASS INDEX: 27.93 KG/M2 | TEMPERATURE: 98.2 F | HEART RATE: 69 BPM | HEIGHT: 69 IN | OXYGEN SATURATION: 98 % | RESPIRATION RATE: 16 BRPM | WEIGHT: 188.6 LBS | DIASTOLIC BLOOD PRESSURE: 82 MMHG | SYSTOLIC BLOOD PRESSURE: 125 MMHG

## 2021-10-20 DIAGNOSIS — M79.674 TOE PAIN, RIGHT: ICD-10-CM

## 2021-10-20 DIAGNOSIS — Z12.5 PROSTATE CANCER SCREENING: ICD-10-CM

## 2021-10-20 DIAGNOSIS — Z00.00 PREVENTATIVE HEALTH CARE: Primary | ICD-10-CM

## 2021-10-20 PROCEDURE — 36415 COLL VENOUS BLD VENIPUNCTURE: CPT | Performed by: FAMILY MEDICINE

## 2021-10-20 PROCEDURE — 84443 ASSAY THYROID STIM HORMONE: CPT | Performed by: FAMILY MEDICINE

## 2021-10-20 PROCEDURE — 81003 URINALYSIS AUTO W/O SCOPE: CPT | Performed by: FAMILY MEDICINE

## 2021-10-20 PROCEDURE — 84550 ASSAY OF BLOOD/URIC ACID: CPT | Performed by: FAMILY MEDICINE

## 2021-10-20 PROCEDURE — 80053 COMPREHEN METABOLIC PANEL: CPT | Performed by: FAMILY MEDICINE

## 2021-10-20 PROCEDURE — 99396 PREV VISIT EST AGE 40-64: CPT | Performed by: FAMILY MEDICINE

## 2021-10-20 PROCEDURE — 85025 COMPLETE CBC W/AUTO DIFF WBC: CPT | Performed by: FAMILY MEDICINE

## 2021-10-20 PROCEDURE — 80061 LIPID PANEL: CPT | Performed by: FAMILY MEDICINE

## 2021-10-20 PROCEDURE — 83036 HEMOGLOBIN GLYCOSYLATED A1C: CPT | Performed by: FAMILY MEDICINE

## 2021-10-20 PROCEDURE — 82306 VITAMIN D 25 HYDROXY: CPT | Performed by: FAMILY MEDICINE

## 2021-10-20 PROCEDURE — G0103 PSA SCREENING: HCPCS | Performed by: FAMILY MEDICINE

## 2021-10-20 NOTE — PROGRESS NOTES
Subjective   Chief Complaint   Patient presents with   • Annual Exam   • Pain   • ear pressure     Jovany Taveras is a 56 y.o. male.     Patient Care Team:  Debra Frazier MD as PCP - General    He is coming in today for his annual wellness exam and he is also due for fasting wellness blood work.  He has pretty healthy lifestyle, he is trying to keep up with healthy diet and he certainly stays very physically active.  He had left shoulder rotator cuff repair early this year, he pretty much feels back to normal and has full range of motion.  Since 6/2021 he started noticing some pain on and off in the right great toe.  He denies any trauma.  He denies any redness or obvious swelling.  He is concerned that this might be due to gout.  He also injured his left fifth finger few days ago.  He is able to move it just fine, but has some soreness at the distal phalanx.  He also has been having some fullness and discomfort in his ears, mainly in the left ear.  No fever or difficulty breathing are being reported. Patient does not report any chest pain, shortness of breath, dizziness, nausea, vomiting, or diarrhea, visual issues, headaches, numbness or tingling. No urinary issues reported like urgency, frequency, or discomfort upon urination.  No significant weight changes reported.  No swelling reported.  No rashes or any other skin issues reported. No emotional issues or insomnia.       The following portions of the patient's history were reviewed and updated as appropriate: allergies, current medications, past family history, past medical history, past social history, past surgical history and problem list.  Past Medical History:   Diagnosis Date   • ADD (attention deficit disorder)    • Allergic rhinitis    • Bulging lumbar disc    • Chlamydia infection    • Genital warts    • Osteoarthritis    • Rotator cuff tear    • Vitamin D deficiency      Past Surgical History:   Procedure Laterality Date   • COLONOSCOPY   09/10/2018    polyps removed; 5 years repeat   • SHOULDER ARTHROSCOPY W/ ROTATOR CUFF REPAIR Left 2/19/2021    Procedure: SHOULDER ARTHROSCOPY, BANKART REPAIR AND ROTATOR CUFF REPAIR;  Surgeon: Wan Mccormick MD;  Location: [facility] MAIN OR;  Service: Orthopedics;  Laterality: Left;   • SHOULDER SURGERY Left 02/19/2021    scope/ bankart repair   • TONSILLECTOMY       The patient has a family history of  Family History   Problem Relation Age of Onset   • Hypertension Father    • Osteoarthritis Father    • Osteoporosis Father      Social History     Socioeconomic History   • Marital status: Single   Tobacco Use   • Smoking status: Never Smoker   • Smokeless tobacco: Never Used   Vaping Use   • Vaping Use: Never used   Substance and Sexual Activity   • Alcohol use: Yes     Alcohol/week: 12.0 standard drinks     Types: 12 Cans of beer per week   • Drug use: No   • Sexual activity: Defer       Review of Systems   Constitutional: Negative for activity change, appetite change, chills, fatigue, fever, unexpected weight gain and unexpected weight loss.   HENT: Positive for ear pain. Negative for congestion, hearing loss, nosebleeds, postnasal drip, swollen glands, tinnitus and trouble swallowing.    Eyes: Negative for blurred vision, double vision and visual disturbance.   Respiratory: Negative for cough, choking, chest tightness, shortness of breath, wheezing and stridor.    Cardiovascular: Negative for chest pain, palpitations and leg swelling.   Gastrointestinal: Negative for abdominal distention, abdominal pain, anal bleeding, constipation, diarrhea, nausea, vomiting and GERD.   Endocrine: Negative for cold intolerance, heat intolerance and polyphagia.   Genitourinary: Negative for dysuria, flank pain, frequency, hematuria and urgency.   Musculoskeletal: Positive for arthralgias. Negative for back pain, gait problem, joint swelling and neck pain.   Skin: Negative for color change, dry skin and skin lesions.  "  Allergic/Immunologic: Negative for environmental allergies and food allergies.   Neurological: Negative for dizziness, tremors, speech difficulty, light-headedness, numbness, headache and confusion.   Hematological: Negative for adenopathy. Does not bruise/bleed easily.   Psychiatric/Behavioral: Negative for decreased concentration, sleep disturbance, depressed mood and stress.     Visit Vitals  /82 (BP Location: Left arm, Patient Position: Sitting, Cuff Size: Adult)   Pulse 69   Temp 98.2 °F (36.8 °C)   Resp 16   Ht 175.3 cm (69\")   Wt 85.5 kg (188 lb 9.6 oz)   SpO2 98%   BMI 27.85 kg/m²       Current Outpatient Medications:   •  amphetamine-dextroamphetamine (Adderall) 20 MG tablet, Take 1 tablet by mouth 2 (Two) Times a Day., Disp: 60 tablet, Rfl: 0  •  betamethasone, augmented, (DIPROLENE) 0.05 % gel, Apply 1 application topically to the appropriate area as directed As Needed., Disp: , Rfl:   •  fluticasone (FLONASE) 50 MCG/ACT nasal spray, FLUTICASONE PROPIONATE 50 MCG/ACT SUSP, Disp: , Rfl:   •  meloxicam (MOBIC) 15 MG tablet, Take 1 tablet by mouth Daily., Disp: 90 tablet, Rfl: 1    Objective   Physical Exam  Vitals and nursing note reviewed.   Constitutional:       General: He is not in acute distress.     Appearance: He is well-developed. He is not diaphoretic.   HENT:      Head: Normocephalic and atraumatic.      Right Ear: External ear normal.      Left Ear: External ear normal.   Eyes:      General: No scleral icterus.        Right eye: No discharge.         Left eye: No discharge.      Conjunctiva/sclera: Conjunctivae normal.      Pupils: Pupils are equal, round, and reactive to light.   Neck:      Thyroid: No thyromegaly.      Vascular: No JVD.   Cardiovascular:      Rate and Rhythm: Normal rate and regular rhythm.      Heart sounds: Normal heart sounds. No murmur heard.  No friction rub. No gallop.    Pulmonary:      Effort: Pulmonary effort is normal. No respiratory distress.      Breath " sounds: Normal breath sounds. No stridor. No wheezing, rhonchi or rales.   Abdominal:      General: Bowel sounds are normal. There is no distension.      Palpations: Abdomen is soft. There is no mass.      Tenderness: There is no abdominal tenderness. There is no guarding or rebound.      Hernia: No hernia is present.   Musculoskeletal:         General: No swelling, tenderness or deformity. Normal range of motion.      Cervical back: Normal range of motion and neck supple. No muscular tenderness.      Right lower leg: No edema.      Left lower leg: No edema.      Comments: Right foot was examined, there is some swelling noted of the first metatarsal phalangeal joint.  Also left hand was examined and there is some bruise noted at the distal phalanx, full range of motion.   Lymphadenopathy:      Cervical: No cervical adenopathy.   Skin:     General: Skin is warm and dry.      Capillary Refill: Capillary refill takes less than 2 seconds.      Coloration: Skin is not pale.      Findings: No bruising, erythema, lesion or rash.   Neurological:      General: No focal deficit present.      Mental Status: He is alert and oriented to person, place, and time.      Cranial Nerves: No cranial nerve deficit.      Sensory: No sensory deficit.      Motor: No weakness.      Coordination: Coordination normal.      Deep Tendon Reflexes: Reflexes normal.   Psychiatric:         Mood and Affect: Mood normal.         Behavior: Behavior normal.         Judgment: Judgment normal.         Assessment/Plan   Diagnoses and all orders for this visit:    1. Preventative health care (Primary)  -     PSA Screen  -     Hemoglobin A1c    2. Prostate cancer screening  -     CBC Auto Differential  -     Comprehensive Metabolic Panel  -     Lipid Panel  -     Vitamin D 25 Hydroxy  -     Urinalysis With Culture If Indicated - Urine, Clean Catch  -     TSH    3. Toe pain, right  -     Uric Acid; Future      Wellness exam was done today - see above for  details. Healthy life style was reviewed and discussed and re-enforced. Regular exercise and healthy diet were also discussed and recommended.  I will be getting fasting blood work.  I reviewed his health maintenance.  His last colonoscopy was in 9/2018 and 5-year follow-up was recommended.  He is fully vaccinated against COVID-19 and he will be getting a booster shot as soon as this is available to him.  I will be checking uric acid to rule out gout if it comes to the symptoms of his right great toe.  I advised for him to start using Flonase to help with the ear symptoms.  We also addressed his left fifth finger, he possibly might have a small fracture.  We discussed the x-ray, but decided not to proceed at this point.            Return in about 6 months (around 4/20/2022) for Next scheduled follow up.    Requested Prescriptions      No prescriptions requested or ordered in this encounter

## 2021-10-21 DIAGNOSIS — M79.674 TOE PAIN, RIGHT: Primary | ICD-10-CM

## 2021-10-21 LAB
25(OH)D3 SERPL-MCNC: 50.7 NG/ML (ref 30–100)
ALBUMIN SERPL-MCNC: 5 G/DL (ref 3.5–5.2)
ALBUMIN/GLOB SERPL: 2.2 G/DL
ALP SERPL-CCNC: 51 U/L (ref 39–117)
ALT SERPL W P-5'-P-CCNC: 15 U/L (ref 1–41)
ANION GAP SERPL CALCULATED.3IONS-SCNC: 12.3 MMOL/L (ref 5–15)
AST SERPL-CCNC: 17 U/L (ref 1–40)
BASOPHILS # BLD AUTO: 0.04 10*3/MM3 (ref 0–0.2)
BASOPHILS NFR BLD AUTO: 0.5 % (ref 0–1.5)
BILIRUB SERPL-MCNC: 0.6 MG/DL (ref 0–1.2)
BILIRUB UR QL STRIP: NEGATIVE
BUN SERPL-MCNC: 20 MG/DL (ref 6–20)
BUN/CREAT SERPL: 19.2 (ref 7–25)
CALCIUM SPEC-SCNC: 9.7 MG/DL (ref 8.6–10.5)
CHLORIDE SERPL-SCNC: 101 MMOL/L (ref 98–107)
CHOLEST SERPL-MCNC: 227 MG/DL (ref 0–200)
CLARITY UR: CLEAR
CO2 SERPL-SCNC: 26.7 MMOL/L (ref 22–29)
COLOR UR: YELLOW
CREAT SERPL-MCNC: 1.04 MG/DL (ref 0.76–1.27)
DEPRECATED RDW RBC AUTO: 47.8 FL (ref 37–54)
EOSINOPHIL # BLD AUTO: 0.12 10*3/MM3 (ref 0–0.4)
EOSINOPHIL NFR BLD AUTO: 1.6 % (ref 0.3–6.2)
ERYTHROCYTE [DISTWIDTH] IN BLOOD BY AUTOMATED COUNT: 14.4 % (ref 12.3–15.4)
GFR SERPL CREATININE-BSD FRML MDRD: 74 ML/MIN/1.73
GLOBULIN UR ELPH-MCNC: 2.3 GM/DL
GLUCOSE SERPL-MCNC: 90 MG/DL (ref 65–99)
GLUCOSE UR STRIP-MCNC: NEGATIVE MG/DL
HBA1C MFR BLD: 5.4 % (ref 3.5–5.6)
HCT VFR BLD AUTO: 45.9 % (ref 37.5–51)
HDLC SERPL-MCNC: 89 MG/DL (ref 40–60)
HGB BLD-MCNC: 14.7 G/DL (ref 13–17.7)
HGB UR QL STRIP.AUTO: NEGATIVE
IMM GRANULOCYTES # BLD AUTO: 0.02 10*3/MM3 (ref 0–0.05)
IMM GRANULOCYTES NFR BLD AUTO: 0.3 % (ref 0–0.5)
KETONES UR QL STRIP: NEGATIVE
LDLC SERPL CALC-MCNC: 129 MG/DL (ref 0–100)
LDLC/HDLC SERPL: 1.44 {RATIO}
LEUKOCYTE ESTERASE UR QL STRIP.AUTO: NEGATIVE
LYMPHOCYTES # BLD AUTO: 3.09 10*3/MM3 (ref 0.7–3.1)
LYMPHOCYTES NFR BLD AUTO: 42.4 % (ref 19.6–45.3)
MCH RBC QN AUTO: 29.5 PG (ref 26.6–33)
MCHC RBC AUTO-ENTMCNC: 32 G/DL (ref 31.5–35.7)
MCV RBC AUTO: 92.2 FL (ref 79–97)
MONOCYTES # BLD AUTO: 0.51 10*3/MM3 (ref 0.1–0.9)
MONOCYTES NFR BLD AUTO: 7 % (ref 5–12)
NEUTROPHILS NFR BLD AUTO: 3.5 10*3/MM3 (ref 1.7–7)
NEUTROPHILS NFR BLD AUTO: 48.2 % (ref 42.7–76)
NITRITE UR QL STRIP: NEGATIVE
NRBC BLD AUTO-RTO: 0 /100 WBC (ref 0–0.2)
PH UR STRIP.AUTO: 7 [PH] (ref 5–8)
PLATELET # BLD AUTO: 271 10*3/MM3 (ref 140–450)
PMV BLD AUTO: 10.4 FL (ref 6–12)
POTASSIUM SERPL-SCNC: 4.8 MMOL/L (ref 3.5–5.2)
PROT SERPL-MCNC: 7.3 G/DL (ref 6–8.5)
PROT UR QL STRIP: NEGATIVE
PSA SERPL-MCNC: 0.6 NG/ML (ref 0–4)
RBC # BLD AUTO: 4.98 10*6/MM3 (ref 4.14–5.8)
SODIUM SERPL-SCNC: 140 MMOL/L (ref 136–145)
SP GR UR STRIP: 1.01 (ref 1–1.03)
TRIGL SERPL-MCNC: 50 MG/DL (ref 0–150)
TSH SERPL DL<=0.05 MIU/L-ACNC: 1.23 UIU/ML (ref 0.27–4.2)
URATE SERPL-MCNC: 5 MG/DL (ref 3.4–7)
UROBILINOGEN UR QL STRIP: NORMAL
VLDLC SERPL-MCNC: 9 MG/DL (ref 5–40)
WBC # BLD AUTO: 7.28 10*3/MM3 (ref 3.4–10.8)

## 2021-11-08 DIAGNOSIS — F98.8 ATTENTION DEFICIT DISORDER (ADD) WITHOUT HYPERACTIVITY: ICD-10-CM

## 2021-11-08 RX ORDER — DEXTROAMPHETAMINE SACCHARATE, AMPHETAMINE ASPARTATE, DEXTROAMPHETAMINE SULFATE AND AMPHETAMINE SULFATE 5; 5; 5; 5 MG/1; MG/1; MG/1; MG/1
1 TABLET ORAL 2 TIMES DAILY
Qty: 60 TABLET | Refills: 0 | Status: SHIPPED | OUTPATIENT
Start: 2021-11-08 | End: 2021-12-08 | Stop reason: SDUPTHER

## 2021-11-19 ENCOUNTER — OFFICE VISIT (OUTPATIENT)
Dept: FAMILY MEDICINE CLINIC | Facility: CLINIC | Age: 56
End: 2021-11-19

## 2021-11-19 VITALS
WEIGHT: 189 LBS | RESPIRATION RATE: 16 BRPM | OXYGEN SATURATION: 96 % | HEART RATE: 57 BPM | DIASTOLIC BLOOD PRESSURE: 89 MMHG | TEMPERATURE: 97.7 F | SYSTOLIC BLOOD PRESSURE: 144 MMHG | BODY MASS INDEX: 27.99 KG/M2 | HEIGHT: 69 IN

## 2021-11-19 DIAGNOSIS — J01.01 ACUTE RECURRENT MAXILLARY SINUSITIS: Primary | ICD-10-CM

## 2021-11-19 DIAGNOSIS — L30.9 DERMATITIS: ICD-10-CM

## 2021-11-19 PROCEDURE — 99213 OFFICE O/P EST LOW 20 MIN: CPT | Performed by: FAMILY MEDICINE

## 2021-11-19 RX ORDER — AZITHROMYCIN 250 MG/1
250 TABLET, FILM COATED ORAL DAILY
Qty: 6 TABLET | Refills: 0 | Status: SHIPPED | OUTPATIENT
Start: 2021-11-19 | End: 2021-11-24

## 2021-11-19 RX ORDER — METHYLPREDNISOLONE 4 MG/1
TABLET ORAL
Qty: 1 EACH | Refills: 0 | OUTPATIENT
Start: 2021-11-19 | End: 2022-02-26

## 2021-11-19 RX ORDER — BETAMETHASONE DIPROPIONATE 0.05 %
1 GEL (GRAM) TOPICAL 2 TIMES DAILY
Qty: 15 G | Refills: 0 | Status: SHIPPED | OUTPATIENT
Start: 2021-11-19

## 2021-12-08 DIAGNOSIS — F98.8 ATTENTION DEFICIT DISORDER (ADD) WITHOUT HYPERACTIVITY: ICD-10-CM

## 2021-12-08 RX ORDER — DEXTROAMPHETAMINE SACCHARATE, AMPHETAMINE ASPARTATE, DEXTROAMPHETAMINE SULFATE AND AMPHETAMINE SULFATE 5; 5; 5; 5 MG/1; MG/1; MG/1; MG/1
1 TABLET ORAL 2 TIMES DAILY
Qty: 60 TABLET | Refills: 0 | Status: SHIPPED | OUTPATIENT
Start: 2021-12-08 | End: 2022-01-11 | Stop reason: SDUPTHER

## 2022-01-11 DIAGNOSIS — F98.8 ATTENTION DEFICIT DISORDER (ADD) WITHOUT HYPERACTIVITY: ICD-10-CM

## 2022-01-11 RX ORDER — DEXTROAMPHETAMINE SACCHARATE, AMPHETAMINE ASPARTATE, DEXTROAMPHETAMINE SULFATE AND AMPHETAMINE SULFATE 5; 5; 5; 5 MG/1; MG/1; MG/1; MG/1
1 TABLET ORAL 2 TIMES DAILY
Qty: 60 TABLET | Refills: 0 | Status: SHIPPED | OUTPATIENT
Start: 2022-01-11 | End: 2022-02-09 | Stop reason: SDUPTHER

## 2022-02-09 DIAGNOSIS — F98.8 ATTENTION DEFICIT DISORDER (ADD) WITHOUT HYPERACTIVITY: ICD-10-CM

## 2022-02-09 RX ORDER — DEXTROAMPHETAMINE SACCHARATE, AMPHETAMINE ASPARTATE, DEXTROAMPHETAMINE SULFATE AND AMPHETAMINE SULFATE 5; 5; 5; 5 MG/1; MG/1; MG/1; MG/1
1 TABLET ORAL 2 TIMES DAILY
Qty: 60 TABLET | Refills: 0 | Status: SHIPPED | OUTPATIENT
Start: 2022-02-09 | End: 2022-03-09 | Stop reason: SDUPTHER

## 2022-03-08 RX ORDER — MELOXICAM 15 MG/1
15 TABLET ORAL DAILY
Qty: 90 TABLET | Refills: 1 | Status: SHIPPED | OUTPATIENT
Start: 2022-03-08 | End: 2022-09-12

## 2022-03-09 DIAGNOSIS — F98.8 ATTENTION DEFICIT DISORDER (ADD) WITHOUT HYPERACTIVITY: ICD-10-CM

## 2022-03-09 RX ORDER — DEXTROAMPHETAMINE SACCHARATE, AMPHETAMINE ASPARTATE, DEXTROAMPHETAMINE SULFATE AND AMPHETAMINE SULFATE 5; 5; 5; 5 MG/1; MG/1; MG/1; MG/1
1 TABLET ORAL 2 TIMES DAILY
Qty: 60 TABLET | Refills: 0 | Status: SHIPPED | OUTPATIENT
Start: 2022-03-09 | End: 2022-04-14 | Stop reason: SDUPTHER

## 2022-04-04 DIAGNOSIS — M79.674 TOE PAIN, RIGHT: Primary | ICD-10-CM

## 2022-04-14 ENCOUNTER — OFFICE VISIT (OUTPATIENT)
Dept: FAMILY MEDICINE CLINIC | Facility: CLINIC | Age: 57
End: 2022-04-14

## 2022-04-14 VITALS
HEART RATE: 70 BPM | WEIGHT: 196.8 LBS | OXYGEN SATURATION: 96 % | RESPIRATION RATE: 16 BRPM | BODY MASS INDEX: 29.15 KG/M2 | SYSTOLIC BLOOD PRESSURE: 120 MMHG | DIASTOLIC BLOOD PRESSURE: 78 MMHG | TEMPERATURE: 98.1 F | HEIGHT: 69 IN

## 2022-04-14 DIAGNOSIS — M54.42 CHRONIC BILATERAL LOW BACK PAIN WITH BILATERAL SCIATICA: Primary | ICD-10-CM

## 2022-04-14 DIAGNOSIS — G89.29 CHRONIC BILATERAL LOW BACK PAIN WITH BILATERAL SCIATICA: Primary | ICD-10-CM

## 2022-04-14 DIAGNOSIS — M54.41 CHRONIC BILATERAL LOW BACK PAIN WITH BILATERAL SCIATICA: Primary | ICD-10-CM

## 2022-04-14 DIAGNOSIS — F98.8 ATTENTION DEFICIT DISORDER (ADD) WITHOUT HYPERACTIVITY: ICD-10-CM

## 2022-04-14 PROBLEM — R35.0 URINARY FREQUENCY: Status: RESOLVED | Noted: 2021-04-27 | Resolved: 2022-04-14

## 2022-04-14 PROBLEM — R30.0 DYSURIA: Status: RESOLVED | Noted: 2021-04-27 | Resolved: 2022-04-14

## 2022-04-14 PROCEDURE — 99214 OFFICE O/P EST MOD 30 MIN: CPT | Performed by: FAMILY MEDICINE

## 2022-04-14 RX ORDER — TRAMADOL HYDROCHLORIDE 50 MG/1
50 TABLET ORAL EVERY 6 HOURS PRN
Qty: 20 TABLET | Refills: 0 | Status: SHIPPED | OUTPATIENT
Start: 2022-04-14

## 2022-04-14 RX ORDER — DEXTROAMPHETAMINE SACCHARATE, AMPHETAMINE ASPARTATE, DEXTROAMPHETAMINE SULFATE AND AMPHETAMINE SULFATE 5; 5; 5; 5 MG/1; MG/1; MG/1; MG/1
20 TABLET ORAL
COMMUNITY
Start: 2022-03-09 | End: 2022-04-14 | Stop reason: SDUPTHER

## 2022-04-14 RX ORDER — CYCLOBENZAPRINE HCL 10 MG
10 TABLET ORAL NIGHTLY PRN
Qty: 20 TABLET | Refills: 0 | Status: SHIPPED | OUTPATIENT
Start: 2022-04-14

## 2022-04-14 RX ORDER — CYCLOBENZAPRINE HCL 10 MG
1 TABLET ORAL EVERY 8 HOURS PRN
COMMUNITY
Start: 2022-04-09 | End: 2022-04-14 | Stop reason: SDUPTHER

## 2022-04-14 RX ORDER — METHYLPREDNISOLONE 4 MG/1
TABLET ORAL
Qty: 1 EACH | Refills: 0 | Status: SHIPPED | OUTPATIENT
Start: 2022-04-14 | End: 2022-04-29

## 2022-04-14 RX ORDER — DEXTROAMPHETAMINE SACCHARATE, AMPHETAMINE ASPARTATE, DEXTROAMPHETAMINE SULFATE AND AMPHETAMINE SULFATE 5; 5; 5; 5 MG/1; MG/1; MG/1; MG/1
20 TABLET ORAL 2 TIMES DAILY
Qty: 60 TABLET | Refills: 0 | Status: SHIPPED | OUTPATIENT
Start: 2022-04-14 | End: 2022-05-13 | Stop reason: SDUPTHER

## 2022-04-14 NOTE — PROGRESS NOTES
Subjective   Chief Complaint   Patient presents with   • Back Pain     Lower/ since Thursday   • ADD   • Med Refill     Jovany Taveras is a 57 y.o. male.     Patient Care Team:  Debra Frazier MD as PCP - General    He is coming in today to talk about his back problems.  He has been dealing with chronic back pain for a long time, he simply found a way how to cope with the pain, he is on anti-inflammatories, he also has been using inversion table which helps.  Last week after exercising he felt a pop in his back, later on that day he started noticing pain in the back which was progressively getting worse.  He tried to use the inversion table as he typically does, but at that time it actually made it worse.  He started feeling pain moving down to his butt cheeks and also in his legs in front of his thighs, the pain was getting excruciating.  He took a hot bath and that helped some.  The next day the pain was so bad that he even vomited.  He was seen at urgent care and he was given Toradol shot and prescription for muscle relaxant.  He had some leftover oxycodone at home from his shoulder surgery couple of years ago and he was taking that regularly and that seems to be helping.  He now feels much better and is able to function.  He was not able to work all this week due to the back pain.  He is also following up on his ADD.  He is on Adderall and this medication works for him well.  He would like to get refill.       The following portions of the patient's history were reviewed and updated as appropriate: allergies, current medications, past family history, past medical history, past social history, past surgical history and problem list.  Past Medical History:   Diagnosis Date   • ADD (attention deficit disorder)    • Allergic    • Allergic rhinitis    • Bulging lumbar disc    • Chlamydia infection    • Genital warts    • Osteoarthritis    • Rotator cuff tear    • Vitamin D deficiency      Past Surgical History:  "  Procedure Laterality Date   • COLONOSCOPY  09/10/2018    polyps removed; 5 years repeat   • SHOULDER ARTHROSCOPY W/ ROTATOR CUFF REPAIR Left 2/19/2021    Procedure: SHOULDER ARTHROSCOPY, BANKART REPAIR AND ROTATOR CUFF REPAIR;  Surgeon: Wan Mccormick MD;  Location: [facility] MAIN OR;  Service: Orthopedics;  Laterality: Left;   • SHOULDER SURGERY Left 02/19/2021    scope/ bankart repair   • TONSILLECTOMY       The patient has a family history of  Family History   Problem Relation Age of Onset   • Hypertension Father    • Osteoarthritis Father    • Osteoporosis Father      Social History     Socioeconomic History   • Marital status: Single   Tobacco Use   • Smoking status: Never Smoker   • Smokeless tobacco: Never Used   Vaping Use   • Vaping Use: Never used   Substance and Sexual Activity   • Alcohol use: Yes     Alcohol/week: 6.0 standard drinks     Types: 6 Cans of beer per week   • Drug use: No   • Sexual activity: Yes     Partners: Female     Birth control/protection: I.U.D.       Review of Systems   Constitutional: Negative for fever and unexpected weight loss.   Cardiovascular: Negative for chest pain.   Gastrointestinal: Negative for abdominal pain.   Genitourinary: Negative for dysuria and urinary incontinence.   Musculoskeletal: Positive for back pain. Negative for gait problem.   Neurological: Positive for weakness. Negative for tremors and numbness.   Psychiatric/Behavioral: Positive for decreased concentration.     Visit Vitals  /78 (BP Location: Left arm, Patient Position: Sitting, Cuff Size: Adult)   Pulse 70   Temp 98.1 °F (36.7 °C)   Resp 16   Ht 175.3 cm (69.02\")   Wt 89.3 kg (196 lb 12.8 oz)   SpO2 96%   BMI 29.05 kg/m²       Current Outpatient Medications:   •  amphetamine-dextroamphetamine (ADDERALL) 20 MG tablet, Take 1 tablet by mouth 2 (Two) Times a Day., Disp: 60 tablet, Rfl: 0  •  cyclobenzaprine (FLEXERIL) 10 MG tablet, Take 1 tablet by mouth At Night As Needed for Muscle " Spasms., Disp: 20 tablet, Rfl: 0  •  betamethasone, augmented, (DIPROLENE) 0.05 % gel, Apply 1 application topically to the appropriate area as directed 2 (Two) Times a Day., Disp: 15 g, Rfl: 0  •  meloxicam (MOBIC) 15 MG tablet, TAKE 1 TABLET BY MOUTH DAILY, Disp: 90 tablet, Rfl: 1  •  methylPREDNISolone (Medrol) 4 MG dose pack, Take as directed on package instructions., Disp: 1 each, Rfl: 0  •  traMADol (ULTRAM) 50 MG tablet, Take 1 tablet by mouth Every 6 (Six) Hours As Needed for Moderate Pain ., Disp: 20 tablet, Rfl: 0    Objective   Physical Exam  Constitutional:       General: He is not in acute distress.     Appearance: Normal appearance. He is well-developed. He is not ill-appearing or diaphoretic.      Comments: Patient is in no distress, patient has normal voice and speech.  Normal respiratory effort.   HENT:      Head: Normocephalic and atraumatic.   Pulmonary:      Effort: Pulmonary effort is normal.   Musculoskeletal:      Cervical back: Normal range of motion and neck supple.      Comments: No palpation tenderness over the spinal processes or muscles of the lumbar spine area noted.  Negative straight leg raising test bilaterally.   Neurological:      General: No focal deficit present.      Mental Status: He is alert and oriented to person, place, and time. Mental status is at baseline.   Psychiatric:         Mood and Affect: Mood normal.         Assessment/Plan   Diagnoses and all orders for this visit:    1. Chronic bilateral low back pain with bilateral sciatica (Primary)  -     methylPREDNISolone (Medrol) 4 MG dose pack; Take as directed on package instructions.  Dispense: 1 each; Refill: 0  -     cyclobenzaprine (FLEXERIL) 10 MG tablet; Take 1 tablet by mouth At Night As Needed for Muscle Spasms.  Dispense: 20 tablet; Refill: 0  -     traMADol (ULTRAM) 50 MG tablet; Take 1 tablet by mouth Every 6 (Six) Hours As Needed for Moderate Pain .  Dispense: 20 tablet; Refill: 0    2. Attention deficit  disorder (ADD) without hyperactivity  -     amphetamine-dextroamphetamine (ADDERALL) 20 MG tablet; Take 1 tablet by mouth 2 (Two) Times a Day.  Dispense: 60 tablet; Refill: 0      I reviewed his symptoms and concerns.  His back issues are consistent with a flareup of his chronic back problems with acute bilateral radiculopathy.  I will be starting him Medrol Dosepak.  I also refilled his muscle relaxer and gave him prescription for some tramadol to take as needed.  He is to monitor his symptoms and contact us back if any concerns.  He had evaluation about 6 years ago including x-rays and MRI and at that time he was even seen by neurosurgeon.  His ADD symptoms are stable and well-controlled.  He may continue Adderall and refill was given.          Return in about 6 months (around 10/14/2022) for Next scheduled follow up.    Requested Prescriptions     Signed Prescriptions Disp Refills   • methylPREDNISolone (Medrol) 4 MG dose pack 1 each 0     Sig: Take as directed on package instructions.   • cyclobenzaprine (FLEXERIL) 10 MG tablet 20 tablet 0     Sig: Take 1 tablet by mouth At Night As Needed for Muscle Spasms.   • amphetamine-dextroamphetamine (ADDERALL) 20 MG tablet 60 tablet 0     Sig: Take 1 tablet by mouth 2 (Two) Times a Day.   • traMADol (ULTRAM) 50 MG tablet 20 tablet 0     Sig: Take 1 tablet by mouth Every 6 (Six) Hours As Needed for Moderate Pain .

## 2022-04-29 ENCOUNTER — LAB (OUTPATIENT)
Dept: LAB | Facility: HOSPITAL | Age: 57
End: 2022-04-29

## 2022-04-29 ENCOUNTER — OFFICE VISIT (OUTPATIENT)
Dept: FAMILY MEDICINE CLINIC | Facility: CLINIC | Age: 57
End: 2022-04-29

## 2022-04-29 VITALS
TEMPERATURE: 98.7 F | SYSTOLIC BLOOD PRESSURE: 107 MMHG | RESPIRATION RATE: 16 BRPM | OXYGEN SATURATION: 97 % | WEIGHT: 192.6 LBS | BODY MASS INDEX: 28.53 KG/M2 | HEIGHT: 69 IN | DIASTOLIC BLOOD PRESSURE: 71 MMHG | HEART RATE: 65 BPM

## 2022-04-29 DIAGNOSIS — R10.11 RUQ PAIN: ICD-10-CM

## 2022-04-29 DIAGNOSIS — K21.9 GASTROESOPHAGEAL REFLUX DISEASE, UNSPECIFIED WHETHER ESOPHAGITIS PRESENT: Primary | ICD-10-CM

## 2022-04-29 LAB
ALBUMIN SERPL-MCNC: 4.1 G/DL (ref 3.5–5.2)
ALBUMIN/GLOB SERPL: 1.6 G/DL
ALP SERPL-CCNC: 50 U/L (ref 39–117)
ALT SERPL W P-5'-P-CCNC: 33 U/L (ref 1–41)
ANION GAP SERPL CALCULATED.3IONS-SCNC: 13.1 MMOL/L (ref 5–15)
AST SERPL-CCNC: 27 U/L (ref 1–40)
BASOPHILS # BLD AUTO: 0.03 10*3/MM3 (ref 0–0.2)
BASOPHILS NFR BLD AUTO: 0.6 % (ref 0–1.5)
BILIRUB SERPL-MCNC: 0.2 MG/DL (ref 0–1.2)
BUN SERPL-MCNC: 21 MG/DL (ref 6–20)
BUN/CREAT SERPL: 21.2 (ref 7–25)
CALCIUM SPEC-SCNC: 9.5 MG/DL (ref 8.6–10.5)
CHLORIDE SERPL-SCNC: 102 MMOL/L (ref 98–107)
CO2 SERPL-SCNC: 25.9 MMOL/L (ref 22–29)
CREAT SERPL-MCNC: 0.99 MG/DL (ref 0.76–1.27)
DEPRECATED RDW RBC AUTO: 43.5 FL (ref 37–54)
EGFRCR SERPLBLD CKD-EPI 2021: 90 ML/MIN/1.73
EOSINOPHIL # BLD AUTO: 0.09 10*3/MM3 (ref 0–0.4)
EOSINOPHIL NFR BLD AUTO: 1.9 % (ref 0.3–6.2)
ERYTHROCYTE [DISTWIDTH] IN BLOOD BY AUTOMATED COUNT: 13.4 % (ref 12.3–15.4)
GLOBULIN UR ELPH-MCNC: 2.6 GM/DL
GLUCOSE SERPL-MCNC: 90 MG/DL (ref 65–99)
HCT VFR BLD AUTO: 44.3 % (ref 37.5–51)
HGB BLD-MCNC: 14.6 G/DL (ref 13–17.7)
IMM GRANULOCYTES # BLD AUTO: 0.01 10*3/MM3 (ref 0–0.05)
IMM GRANULOCYTES NFR BLD AUTO: 0.2 % (ref 0–0.5)
LIPASE SERPL-CCNC: 25 U/L (ref 13–60)
LYMPHOCYTES # BLD AUTO: 1.96 10*3/MM3 (ref 0.7–3.1)
LYMPHOCYTES NFR BLD AUTO: 40.3 % (ref 19.6–45.3)
MCH RBC QN AUTO: 29.4 PG (ref 26.6–33)
MCHC RBC AUTO-ENTMCNC: 33 G/DL (ref 31.5–35.7)
MCV RBC AUTO: 89.1 FL (ref 79–97)
MONOCYTES # BLD AUTO: 0.47 10*3/MM3 (ref 0.1–0.9)
MONOCYTES NFR BLD AUTO: 9.7 % (ref 5–12)
NEUTROPHILS NFR BLD AUTO: 2.3 10*3/MM3 (ref 1.7–7)
NEUTROPHILS NFR BLD AUTO: 47.3 % (ref 42.7–76)
NRBC BLD AUTO-RTO: 0 /100 WBC (ref 0–0.2)
PLATELET # BLD AUTO: 192 10*3/MM3 (ref 140–450)
PMV BLD AUTO: 10.3 FL (ref 6–12)
POTASSIUM SERPL-SCNC: 4.3 MMOL/L (ref 3.5–5.2)
PROT SERPL-MCNC: 6.7 G/DL (ref 6–8.5)
RBC # BLD AUTO: 4.97 10*6/MM3 (ref 4.14–5.8)
SODIUM SERPL-SCNC: 141 MMOL/L (ref 136–145)
WBC NRBC COR # BLD: 4.86 10*3/MM3 (ref 3.4–10.8)

## 2022-04-29 PROCEDURE — 80053 COMPREHEN METABOLIC PANEL: CPT | Performed by: FAMILY MEDICINE

## 2022-04-29 PROCEDURE — 36415 COLL VENOUS BLD VENIPUNCTURE: CPT | Performed by: FAMILY MEDICINE

## 2022-04-29 PROCEDURE — 99214 OFFICE O/P EST MOD 30 MIN: CPT | Performed by: FAMILY MEDICINE

## 2022-04-29 PROCEDURE — 85025 COMPLETE CBC W/AUTO DIFF WBC: CPT | Performed by: FAMILY MEDICINE

## 2022-04-29 PROCEDURE — 83690 ASSAY OF LIPASE: CPT

## 2022-04-29 RX ORDER — PANTOPRAZOLE SODIUM 40 MG/1
40 TABLET, DELAYED RELEASE ORAL DAILY
Qty: 30 TABLET | Refills: 0 | Status: SHIPPED | OUTPATIENT
Start: 2022-04-29 | End: 2022-05-27

## 2022-04-29 NOTE — PROGRESS NOTES
Subjective   Chief Complaint   Patient presents with   • Heartburn     reflux   • GI Problem     Jovany Taveras is a 57 y.o. male.     Patient Care Team:  Debra Frazier MD as PCP - General    He is coming in today as he has not been feeling well for the last few weeks.  He originally started having pretty bad back issues, but this is now under control.  For the last 3 weeks however he has been experiencing some indigestion, heartburn.  He even had 1 episode of vomiting 3 weeks ago.  His symptoms have been somehow fluctuating.  At times he even had some chills and elevated temperature at 99.7.  He tells me that he also had feeling in his skin like a curtain, 2 days ago his symptoms were really bad, however yesterday and today he feels much better.  He had some episodes of belching with a better taste coming up to his throat.  That even made him cough.  He has not tried any over-the-counter acid reducers.  He has been on meloxicam for quite some time due to chronic pain.  He was treated with a course of Medrol Dosepak couple of weeks ago due to back issues.  No chest pains or difficulty breathing reported.       The following portions of the patient's history were reviewed and updated as appropriate: allergies, current medications, past family history, past medical history, past social history, past surgical history and problem list.  Past Medical History:   Diagnosis Date   • ADD (attention deficit disorder)    • Allergic    • Allergic rhinitis    • Bulging lumbar disc    • Chlamydia infection    • Genital warts    • Osteoarthritis    • Rotator cuff tear    • Vitamin D deficiency      Past Surgical History:   Procedure Laterality Date   • COLONOSCOPY  09/10/2018    polyps removed; 5 years repeat   • SHOULDER ARTHROSCOPY W/ ROTATOR CUFF REPAIR Left 2/19/2021    Procedure: SHOULDER ARTHROSCOPY, BANKART REPAIR AND ROTATOR CUFF REPAIR;  Surgeon: Wan Mccormick MD;  Location: [facility] MAIN OR;  Service:  "Orthopedics;  Laterality: Left;   • SHOULDER SURGERY Left 02/19/2021    scope/ bankart repair   • TONSILLECTOMY       The patient has a family history of  Family History   Problem Relation Age of Onset   • Hypertension Father    • Osteoarthritis Father    • Osteoporosis Father      Social History     Socioeconomic History   • Marital status: Single   Tobacco Use   • Smoking status: Never Smoker   • Smokeless tobacco: Never Used   Vaping Use   • Vaping Use: Never used   Substance and Sexual Activity   • Alcohol use: Yes     Alcohol/week: 6.0 standard drinks     Types: 6 Cans of beer per week   • Drug use: No   • Sexual activity: Yes     Partners: Female     Birth control/protection: I.U.D.       Review of Systems   Gastrointestinal: Positive for nausea, vomiting and GERD.     Visit Vitals  /71 (BP Location: Left arm, Patient Position: Sitting, Cuff Size: Adult)   Pulse 65   Temp 98.7 °F (37.1 °C) (Temporal)   Resp 16   Ht 175.3 cm (69.02\")   Wt 87.4 kg (192 lb 9.6 oz)   SpO2 97%   BMI 28.43 kg/m²       Current Outpatient Medications:   •  amphetamine-dextroamphetamine (ADDERALL) 20 MG tablet, Take 1 tablet by mouth 2 (Two) Times a Day., Disp: 60 tablet, Rfl: 0  •  betamethasone, augmented, (DIPROLENE) 0.05 % gel, Apply 1 application topically to the appropriate area as directed 2 (Two) Times a Day., Disp: 15 g, Rfl: 0  •  cyclobenzaprine (FLEXERIL) 10 MG tablet, Take 1 tablet by mouth At Night As Needed for Muscle Spasms., Disp: 20 tablet, Rfl: 0  •  meloxicam (MOBIC) 15 MG tablet, TAKE 1 TABLET BY MOUTH DAILY, Disp: 90 tablet, Rfl: 1  •  traMADol (ULTRAM) 50 MG tablet, Take 1 tablet by mouth Every 6 (Six) Hours As Needed for Moderate Pain ., Disp: 20 tablet, Rfl: 0  •  pantoprazole (PROTONIX) 40 MG EC tablet, Take 1 tablet by mouth Daily., Disp: 30 tablet, Rfl: 0    Objective   Physical Exam  Constitutional:       General: He is not in acute distress.     Appearance: Normal appearance. He is well-developed. He " is not ill-appearing or diaphoretic.      Comments: Patient is in no distress, patient has normal voice and speech.  Normal respiratory effort.   HENT:      Head: Normocephalic and atraumatic.   Pulmonary:      Effort: Pulmonary effort is normal.   Abdominal:      General: There is no distension.      Palpations: There is no mass.      Tenderness: There is abdominal tenderness. There is no guarding or rebound.      Hernia: No hernia is present.      Comments: There is some palpation tenderness noted in right upper quadrant.   Musculoskeletal:      Cervical back: Normal range of motion and neck supple.   Neurological:      General: No focal deficit present.      Mental Status: He is alert and oriented to person, place, and time. Mental status is at baseline.   Psychiatric:         Mood and Affect: Mood normal.         Assessment/Plan   Diagnoses and all orders for this visit:    1. Gastroesophageal reflux disease, unspecified whether esophagitis present (Primary)  -     pantoprazole (PROTONIX) 40 MG EC tablet; Take 1 tablet by mouth Daily.  Dispense: 30 tablet; Refill: 0    2. RUQ pain  -     US Abdomen Limited; Future  -     CBC Auto Differential  -     Comprehensive Metabolic Panel  -     Lipase; Future      I reviewed his symptoms and concerns.  I will be starting him on PPI.  I advised for him to discontinue meloxicam as that can definitely cause some GI upset.  I will be getting some labs and I will be ordering right upper quadrant ultrasound to rule out gallbladder issues.  I will advise further once the results are available.        Return if symptoms worsen or fail to improve, for Recheck.    Requested Prescriptions     Signed Prescriptions Disp Refills   • pantoprazole (PROTONIX) 40 MG EC tablet 30 tablet 0     Sig: Take 1 tablet by mouth Daily.

## 2022-05-09 ENCOUNTER — HOSPITAL ENCOUNTER (OUTPATIENT)
Dept: ULTRASOUND IMAGING | Facility: HOSPITAL | Age: 57
Discharge: HOME | End: 2022-05-09
Admitting: FAMILY MEDICINE

## 2022-05-09 DIAGNOSIS — R10.11 RUQ PAIN: ICD-10-CM

## 2022-05-09 PROCEDURE — 76705 ECHO EXAM OF ABDOMEN: CPT

## 2022-05-10 NOTE — PROGRESS NOTES
I called the patient he says he feels great  other than he is 20 lbs. Over weight. His acid reflux, nausea, diarrhea and vomiting are all GONE!  He says he is not okay with the polyp being on the gallbladder because he is afraid in 20-40 years it can be cancerous. HE DOES NOT want the gallbladder removed though. He said instead of calling him back to my chart message him. Thank You

## 2022-05-13 DIAGNOSIS — F98.8 ATTENTION DEFICIT DISORDER (ADD) WITHOUT HYPERACTIVITY: ICD-10-CM

## 2022-05-13 RX ORDER — DEXTROAMPHETAMINE SACCHARATE, AMPHETAMINE ASPARTATE, DEXTROAMPHETAMINE SULFATE AND AMPHETAMINE SULFATE 5; 5; 5; 5 MG/1; MG/1; MG/1; MG/1
20 TABLET ORAL 2 TIMES DAILY
Qty: 60 TABLET | Refills: 0 | Status: SHIPPED | OUTPATIENT
Start: 2022-05-13 | End: 2022-06-15 | Stop reason: SDUPTHER

## 2022-05-27 DIAGNOSIS — K21.9 GASTROESOPHAGEAL REFLUX DISEASE, UNSPECIFIED WHETHER ESOPHAGITIS PRESENT: ICD-10-CM

## 2022-05-27 RX ORDER — PANTOPRAZOLE SODIUM 40 MG/1
40 TABLET, DELAYED RELEASE ORAL DAILY
Qty: 30 TABLET | Refills: 0 | Status: SHIPPED | OUTPATIENT
Start: 2022-05-27 | End: 2022-06-15 | Stop reason: SDUPTHER

## 2022-06-15 DIAGNOSIS — K21.9 GASTROESOPHAGEAL REFLUX DISEASE, UNSPECIFIED WHETHER ESOPHAGITIS PRESENT: ICD-10-CM

## 2022-06-15 DIAGNOSIS — F98.8 ATTENTION DEFICIT DISORDER (ADD) WITHOUT HYPERACTIVITY: ICD-10-CM

## 2022-06-15 RX ORDER — PANTOPRAZOLE SODIUM 40 MG/1
40 TABLET, DELAYED RELEASE ORAL DAILY
Qty: 30 TABLET | Refills: 0 | Status: SHIPPED | OUTPATIENT
Start: 2022-06-15 | End: 2022-07-14

## 2022-06-15 RX ORDER — DEXTROAMPHETAMINE SACCHARATE, AMPHETAMINE ASPARTATE, DEXTROAMPHETAMINE SULFATE AND AMPHETAMINE SULFATE 5; 5; 5; 5 MG/1; MG/1; MG/1; MG/1
20 TABLET ORAL 2 TIMES DAILY
Qty: 60 TABLET | Refills: 0 | Status: SHIPPED | OUTPATIENT
Start: 2022-06-15 | End: 2022-07-14 | Stop reason: SDUPTHER

## 2022-07-14 DIAGNOSIS — K21.9 GASTROESOPHAGEAL REFLUX DISEASE, UNSPECIFIED WHETHER ESOPHAGITIS PRESENT: ICD-10-CM

## 2022-07-14 DIAGNOSIS — F98.8 ATTENTION DEFICIT DISORDER (ADD) WITHOUT HYPERACTIVITY: ICD-10-CM

## 2022-07-14 RX ORDER — PANTOPRAZOLE SODIUM 40 MG/1
40 TABLET, DELAYED RELEASE ORAL DAILY
Qty: 30 TABLET | Refills: 0 | Status: SHIPPED | OUTPATIENT
Start: 2022-07-14 | End: 2022-08-16 | Stop reason: SDUPTHER

## 2022-07-14 RX ORDER — DEXTROAMPHETAMINE SACCHARATE, AMPHETAMINE ASPARTATE, DEXTROAMPHETAMINE SULFATE AND AMPHETAMINE SULFATE 5; 5; 5; 5 MG/1; MG/1; MG/1; MG/1
20 TABLET ORAL 2 TIMES DAILY
Qty: 60 TABLET | Refills: 0 | Status: SHIPPED | OUTPATIENT
Start: 2022-07-14 | End: 2022-08-16 | Stop reason: SDUPTHER

## 2022-07-14 RX ORDER — PANTOPRAZOLE SODIUM 40 MG/1
40 TABLET, DELAYED RELEASE ORAL DAILY
Qty: 30 TABLET | Refills: 0 | Status: SHIPPED | OUTPATIENT
Start: 2022-07-14 | End: 2022-07-14 | Stop reason: SDUPTHER

## 2022-08-16 DIAGNOSIS — K21.9 GASTROESOPHAGEAL REFLUX DISEASE, UNSPECIFIED WHETHER ESOPHAGITIS PRESENT: ICD-10-CM

## 2022-08-16 DIAGNOSIS — F98.8 ATTENTION DEFICIT DISORDER (ADD) WITHOUT HYPERACTIVITY: ICD-10-CM

## 2022-08-16 RX ORDER — DEXTROAMPHETAMINE SACCHARATE, AMPHETAMINE ASPARTATE, DEXTROAMPHETAMINE SULFATE AND AMPHETAMINE SULFATE 5; 5; 5; 5 MG/1; MG/1; MG/1; MG/1
20 TABLET ORAL 2 TIMES DAILY
Qty: 60 TABLET | Refills: 0 | Status: SHIPPED | OUTPATIENT
Start: 2022-08-16 | End: 2022-09-20 | Stop reason: SDUPTHER

## 2022-08-16 RX ORDER — PANTOPRAZOLE SODIUM 40 MG/1
40 TABLET, DELAYED RELEASE ORAL DAILY
Qty: 30 TABLET | Refills: 0 | Status: SHIPPED | OUTPATIENT
Start: 2022-08-16 | End: 2022-12-30 | Stop reason: SDUPTHER

## 2022-09-12 DIAGNOSIS — F98.8 ATTENTION DEFICIT DISORDER (ADD) WITHOUT HYPERACTIVITY: ICD-10-CM

## 2022-09-12 RX ORDER — DEXTROAMPHETAMINE SACCHARATE, AMPHETAMINE ASPARTATE, DEXTROAMPHETAMINE SULFATE AND AMPHETAMINE SULFATE 5; 5; 5; 5 MG/1; MG/1; MG/1; MG/1
20 TABLET ORAL 2 TIMES DAILY
Qty: 60 TABLET | Refills: 0 | OUTPATIENT
Start: 2022-09-12

## 2022-09-12 RX ORDER — MELOXICAM 15 MG/1
15 TABLET ORAL DAILY
Qty: 90 TABLET | Refills: 0 | Status: SHIPPED | OUTPATIENT
Start: 2022-09-12 | End: 2022-11-23 | Stop reason: SDUPTHER

## 2022-09-20 DIAGNOSIS — F98.8 ATTENTION DEFICIT DISORDER (ADD) WITHOUT HYPERACTIVITY: ICD-10-CM

## 2022-09-20 RX ORDER — DEXTROAMPHETAMINE SACCHARATE, AMPHETAMINE ASPARTATE, DEXTROAMPHETAMINE SULFATE AND AMPHETAMINE SULFATE 5; 5; 5; 5 MG/1; MG/1; MG/1; MG/1
20 TABLET ORAL 2 TIMES DAILY
Qty: 60 TABLET | Refills: 0 | Status: SHIPPED | OUTPATIENT
Start: 2022-09-20 | End: 2022-10-20 | Stop reason: SDUPTHER

## 2022-10-20 DIAGNOSIS — F98.8 ATTENTION DEFICIT DISORDER (ADD) WITHOUT HYPERACTIVITY: ICD-10-CM

## 2022-10-20 RX ORDER — DEXTROAMPHETAMINE SACCHARATE, AMPHETAMINE ASPARTATE, DEXTROAMPHETAMINE SULFATE AND AMPHETAMINE SULFATE 5; 5; 5; 5 MG/1; MG/1; MG/1; MG/1
20 TABLET ORAL 2 TIMES DAILY
Qty: 60 TABLET | Refills: 0 | Status: SHIPPED | OUTPATIENT
Start: 2022-10-20 | End: 2022-11-23 | Stop reason: SDUPTHER

## 2022-11-01 ENCOUNTER — OFFICE VISIT (OUTPATIENT)
Dept: FAMILY MEDICINE CLINIC | Facility: CLINIC | Age: 57
End: 2022-11-01

## 2022-11-01 VITALS
WEIGHT: 192 LBS | TEMPERATURE: 98.4 F | DIASTOLIC BLOOD PRESSURE: 84 MMHG | RESPIRATION RATE: 16 BRPM | HEART RATE: 67 BPM | HEIGHT: 69 IN | OXYGEN SATURATION: 98 % | BODY MASS INDEX: 28.44 KG/M2 | SYSTOLIC BLOOD PRESSURE: 127 MMHG

## 2022-11-01 DIAGNOSIS — J06.9 ACUTE URI: Primary | ICD-10-CM

## 2022-11-01 DIAGNOSIS — F98.8 ATTENTION DEFICIT DISORDER (ADD) WITHOUT HYPERACTIVITY: ICD-10-CM

## 2022-11-01 PROBLEM — J01.01 ACUTE RECURRENT MAXILLARY SINUSITIS: Status: RESOLVED | Noted: 2021-11-19 | Resolved: 2022-11-01

## 2022-11-01 LAB
EXPIRATION DATE: NORMAL
FLUAV AG NPH QL: NEGATIVE
FLUBV AG NPH QL: NEGATIVE
INTERNAL CONTROL: NORMAL
Lab: NORMAL

## 2022-11-01 PROCEDURE — 99213 OFFICE O/P EST LOW 20 MIN: CPT | Performed by: FAMILY MEDICINE

## 2022-11-01 PROCEDURE — 87804 INFLUENZA ASSAY W/OPTIC: CPT | Performed by: FAMILY MEDICINE

## 2022-11-01 RX ORDER — DEXTROMETHORPHAN HYDROBROMIDE AND PROMETHAZINE HYDROCHLORIDE 15; 6.25 MG/5ML; MG/5ML
5 SYRUP ORAL 4 TIMES DAILY PRN
Qty: 180 ML | Refills: 0 | Status: SHIPPED | OUTPATIENT
Start: 2022-11-01

## 2022-11-01 NOTE — PROGRESS NOTES
Subjective   Chief Complaint   Patient presents with   • URI   • Cough     productive   • Fever   • Headache   • Nasal Congestion     Ear pain/ bilateral     Jovany Taveras is a 57 y.o. male.     Patient Care Team:  Debra Frazier MD as PCP - General    History of Present Illness  He is coming in today due to upper respiratory symptoms, which started on 10/27/2022 and progressively got worse.  He reports cough, congestion, body aches and chills as well as low-grade fever.  He also had some headaches and nasal congestion and some ear fullness.  His grandchildren recently tested positive for RSV and there are some other adults in the family sick with similar symptoms.  He tells me that about 3 weeks ago he tested positive for COVID-19, but at that time he had mild symptoms and they resolved fairly quickly.  Since he started being sick this time around he also did a home COVID-19 test and this was negative.  No chest pains or difficulty breathing or wheezing reported.  He also wants to follow-up on his ADD.  He is on Adderall and has been on this medication for several years.  The medication is working for him well and he denies any side effects.       The following portions of the patient's history were reviewed and updated as appropriate: allergies, current medications, past family history, past medical history, past social history, past surgical history and problem list.  Past Medical History:   Diagnosis Date   • ADD (attention deficit disorder)    • Allergic    • Allergic rhinitis    • Bulging lumbar disc    • Chlamydia infection    • Genital warts    • Osteoarthritis    • Rotator cuff tear    • Vitamin D deficiency      Past Surgical History:   Procedure Laterality Date   • COLONOSCOPY  09/10/2018    polyps removed; 5 years repeat   • SHOULDER ARTHROSCOPY W/ ROTATOR CUFF REPAIR Left 2/19/2021    Procedure: SHOULDER ARTHROSCOPY, BANKART REPAIR AND ROTATOR CUFF REPAIR;  Surgeon: Wan Mccormick MD;   "Location: [facility] MAIN OR;  Service: Orthopedics;  Laterality: Left;   • SHOULDER SURGERY Left 02/19/2021    scope/ bankart repair   • TONSILLECTOMY       The patient has a family history of  Family History   Problem Relation Age of Onset   • Hypertension Father    • Osteoarthritis Father    • Osteoporosis Father      Social History     Socioeconomic History   • Marital status: Single   Tobacco Use   • Smoking status: Never   • Smokeless tobacco: Never   Vaping Use   • Vaping Use: Never used   Substance and Sexual Activity   • Alcohol use: Yes     Alcohol/week: 6.0 standard drinks     Types: 6 Cans of beer per week   • Drug use: No   • Sexual activity: Yes     Partners: Female     Birth control/protection: I.U.D.       Review of Systems   Constitutional: Positive for chills, fatigue and fever. Negative for activity change and appetite change.   HENT: Positive for sore throat. Negative for congestion, ear pain, postnasal drip, sinus pressure and swollen glands.    Respiratory: Positive for cough. Negative for choking, chest tightness, shortness of breath, wheezing and stridor.    Cardiovascular: Negative for chest pain.   Skin: Negative for dry skin and rash.   Neurological: Positive for headache.   Psychiatric/Behavioral: Positive for decreased concentration.     Visit Vitals  /84 (BP Location: Left arm, Patient Position: Sitting, Cuff Size: Adult)   Pulse 67   Temp 98.4 °F (36.9 °C) (Temporal)   Resp 16   Ht 175.3 cm (69.02\")   Wt 87.1 kg (192 lb)   SpO2 98%   BMI 28.34 kg/m²       Current Outpatient Medications:   •  amphetamine-dextroamphetamine (ADDERALL) 20 MG tablet, Take 1 tablet by mouth 2 (Two) Times a Day., Disp: 60 tablet, Rfl: 0  •  betamethasone, augmented, (DIPROLENE) 0.05 % gel, Apply 1 application topically to the appropriate area as directed 2 (Two) Times a Day., Disp: 15 g, Rfl: 0  •  cyclobenzaprine (FLEXERIL) 10 MG tablet, Take 1 tablet by mouth At Night As Needed for Muscle Spasms., Disp: " 20 tablet, Rfl: 0  •  meloxicam (MOBIC) 15 MG tablet, TAKE 1 TABLET BY MOUTH DAILY, Disp: 90 tablet, Rfl: 0  •  pantoprazole (PROTONIX) 40 MG EC tablet, Take 1 tablet by mouth Daily., Disp: 30 tablet, Rfl: 0  •  promethazine-dextromethorphan (PROMETHAZINE-DM) 6.25-15 MG/5ML syrup, Take 5 mL by mouth 4 (Four) Times a Day As Needed for Cough., Disp: 180 mL, Rfl: 0  •  traMADol (ULTRAM) 50 MG tablet, Take 1 tablet by mouth Every 6 (Six) Hours As Needed for Moderate Pain ., Disp: 20 tablet, Rfl: 0    Objective   Physical Exam  Constitutional:       General: He is not in acute distress.     Appearance: Normal appearance. He is well-developed. He is not ill-appearing or diaphoretic.      Comments: Patient seems to be somehow rundown, however he is not in any acute distress.   HENT:      Head: Normocephalic and atraumatic.      Comments: Some congestion noted.  Pulmonary:      Effort: Pulmonary effort is normal.   Musculoskeletal:      Cervical back: Normal range of motion and neck supple.   Neurological:      General: No focal deficit present.      Mental Status: He is alert and oriented to person, place, and time. Mental status is at baseline.   Psychiatric:         Mood and Affect: Mood normal.         Assessment & Plan   Diagnoses and all orders for this visit:    1. Acute URI (Primary)  -     Cancel: POCT SARS-CoV-2 Antigen LESLIE  -     promethazine-dextromethorphan (PROMETHAZINE-DM) 6.25-15 MG/5ML syrup; Take 5 mL by mouth 4 (Four) Times a Day As Needed for Cough.  Dispense: 180 mL; Refill: 0  -     POC Influenza A / B    2. Attention deficit disorder (ADD) without hyperactivity      I reviewed his symptoms and concerns.  Rapid flu test was done today and it was negative.  I suspect that his symptoms are due to viral infection, possibly it can be due to RSV as he had close exposure to it recently.  He tested positive for COVID-19 about 3 weeks ago and at that time patient's symptoms were mild and resolved per his  report.  He is to continue symptomatic treatment with over-the-counter medications like Tylenol and ibuprofen as needed.  Plenty of rest and fluids also was discussed and recommended.  I gave him some cough medicine.  He is to monitor his symptoms and contact us back if any concerns.  His ADD symptoms are stable and well-controlled with Adderall.  He may continue the same.  Medication refill was given recently.        Return in about 6 months (around 5/1/2023) for Next scheduled follow up.    Requested Prescriptions     Signed Prescriptions Disp Refills   • promethazine-dextromethorphan (PROMETHAZINE-DM) 6.25-15 MG/5ML syrup 180 mL 0     Sig: Take 5 mL by mouth 4 (Four) Times a Day As Needed for Cough.

## 2022-11-23 DIAGNOSIS — F98.8 ATTENTION DEFICIT DISORDER (ADD) WITHOUT HYPERACTIVITY: ICD-10-CM

## 2022-11-23 RX ORDER — MELOXICAM 15 MG/1
15 TABLET ORAL DAILY
Qty: 90 TABLET | Refills: 0 | Status: SHIPPED | OUTPATIENT
Start: 2022-11-23 | End: 2022-12-20

## 2022-11-23 RX ORDER — DEXTROAMPHETAMINE SACCHARATE, AMPHETAMINE ASPARTATE, DEXTROAMPHETAMINE SULFATE AND AMPHETAMINE SULFATE 5; 5; 5; 5 MG/1; MG/1; MG/1; MG/1
20 TABLET ORAL 2 TIMES DAILY
Qty: 60 TABLET | Refills: 0 | Status: SHIPPED | OUTPATIENT
Start: 2022-11-23 | End: 2023-01-04 | Stop reason: SDUPTHER

## 2022-12-20 RX ORDER — MELOXICAM 15 MG/1
15 TABLET ORAL DAILY
Qty: 90 TABLET | Refills: 0 | Status: SHIPPED | OUTPATIENT
Start: 2022-12-20 | End: 2023-03-13

## 2022-12-30 DIAGNOSIS — K21.9 GASTROESOPHAGEAL REFLUX DISEASE, UNSPECIFIED WHETHER ESOPHAGITIS PRESENT: ICD-10-CM

## 2022-12-30 RX ORDER — PANTOPRAZOLE SODIUM 40 MG/1
40 TABLET, DELAYED RELEASE ORAL DAILY
Qty: 30 TABLET | Refills: 0 | Status: SHIPPED | OUTPATIENT
Start: 2022-12-30 | End: 2022-12-31 | Stop reason: SDUPTHER

## 2023-01-04 DIAGNOSIS — F98.8 ATTENTION DEFICIT DISORDER (ADD) WITHOUT HYPERACTIVITY: ICD-10-CM

## 2023-01-05 RX ORDER — DEXTROAMPHETAMINE SACCHARATE, AMPHETAMINE ASPARTATE, DEXTROAMPHETAMINE SULFATE AND AMPHETAMINE SULFATE 5; 5; 5; 5 MG/1; MG/1; MG/1; MG/1
20 TABLET ORAL 2 TIMES DAILY
Qty: 60 TABLET | Refills: 0 | Status: SHIPPED | OUTPATIENT
Start: 2023-01-05 | End: 2023-03-20 | Stop reason: SDUPTHER

## 2023-03-13 RX ORDER — MELOXICAM 15 MG/1
15 TABLET ORAL DAILY
Qty: 90 TABLET | Refills: 0 | Status: SHIPPED | OUTPATIENT
Start: 2023-03-13

## 2023-03-20 DIAGNOSIS — F98.8 ATTENTION DEFICIT DISORDER (ADD) WITHOUT HYPERACTIVITY: ICD-10-CM

## 2023-03-20 RX ORDER — DEXTROAMPHETAMINE SACCHARATE, AMPHETAMINE ASPARTATE, DEXTROAMPHETAMINE SULFATE AND AMPHETAMINE SULFATE 5; 5; 5; 5 MG/1; MG/1; MG/1; MG/1
20 TABLET ORAL 2 TIMES DAILY
Qty: 60 TABLET | Refills: 0 | Status: SHIPPED | OUTPATIENT
Start: 2023-03-20

## 2023-03-30 DIAGNOSIS — K21.9 GASTROESOPHAGEAL REFLUX DISEASE, UNSPECIFIED WHETHER ESOPHAGITIS PRESENT: ICD-10-CM

## 2023-03-30 RX ORDER — PANTOPRAZOLE SODIUM 40 MG/1
TABLET, DELAYED RELEASE ORAL
Qty: 90 TABLET | Refills: 0 | Status: SHIPPED | OUTPATIENT
Start: 2023-03-30

## 2023-04-04 ENCOUNTER — TELEPHONE (OUTPATIENT)
Dept: FAMILY MEDICINE CLINIC | Facility: CLINIC | Age: 58
End: 2023-04-04

## 2023-04-04 NOTE — TELEPHONE ENCOUNTER
PATIENT IS GOING TO [facility] ON Thursday 4/6/23. I ADVISED HIM TO CALL IN TOMORROW MORNING TO SEE IF WE HAVE ANY CANCELLATIONS.

## 2023-04-04 NOTE — TELEPHONE ENCOUNTER
Caller: Jovany Taveras    Relationship to patient: Self    Best call back number: [phone]    Chief complaint: BACK PAIN    Type of visit: OFFICE VISIT    Requested date: 4/4-4/5/2023    Additional notes: PATIENT HAS TO BE SEEN FOR SHORT TERM DISABILITY. CAN PATIENT BE WORKED IN?

## 2023-04-13 ENCOUNTER — OFFICE VISIT (OUTPATIENT)
Dept: FAMILY MEDICINE CLINIC | Facility: CLINIC | Age: 58
End: 2023-04-13
Payer: COMMERCIAL

## 2023-04-13 VITALS
TEMPERATURE: 97.8 F | WEIGHT: 205.2 LBS | SYSTOLIC BLOOD PRESSURE: 117 MMHG | HEART RATE: 64 BPM | DIASTOLIC BLOOD PRESSURE: 73 MMHG | OXYGEN SATURATION: 98 % | HEIGHT: 69 IN | BODY MASS INDEX: 30.39 KG/M2 | RESPIRATION RATE: 16 BRPM

## 2023-04-13 DIAGNOSIS — E78.2 MIXED HYPERLIPIDEMIA: ICD-10-CM

## 2023-04-13 DIAGNOSIS — R73.9 HYPERGLYCEMIA: ICD-10-CM

## 2023-04-13 DIAGNOSIS — M25.511 CHRONIC RIGHT SHOULDER PAIN: ICD-10-CM

## 2023-04-13 DIAGNOSIS — F98.8 ATTENTION DEFICIT DISORDER (ADD) WITHOUT HYPERACTIVITY: ICD-10-CM

## 2023-04-13 DIAGNOSIS — G89.29 CHRONIC RIGHT SHOULDER PAIN: ICD-10-CM

## 2023-04-13 DIAGNOSIS — M54.42 CHRONIC BILATERAL LOW BACK PAIN WITH BILATERAL SCIATICA: Primary | ICD-10-CM

## 2023-04-13 DIAGNOSIS — G89.29 CHRONIC BILATERAL LOW BACK PAIN WITH BILATERAL SCIATICA: Primary | ICD-10-CM

## 2023-04-13 DIAGNOSIS — M54.41 CHRONIC BILATERAL LOW BACK PAIN WITH BILATERAL SCIATICA: Primary | ICD-10-CM

## 2023-04-13 DIAGNOSIS — K82.4 GALLBLADDER POLYP: ICD-10-CM

## 2023-04-13 PROBLEM — R10.11 RUQ PAIN: Status: RESOLVED | Noted: 2022-04-29 | Resolved: 2023-04-13

## 2023-04-13 PROBLEM — J06.9 ACUTE URI: Status: RESOLVED | Noted: 2022-11-01 | Resolved: 2023-04-13

## 2023-04-13 PROCEDURE — 99214 OFFICE O/P EST MOD 30 MIN: CPT | Performed by: FAMILY MEDICINE

## 2023-04-13 RX ORDER — TRAMADOL HYDROCHLORIDE 50 MG/1
50 TABLET ORAL EVERY 6 HOURS PRN
Qty: 20 TABLET | Refills: 0 | Status: SHIPPED | OUTPATIENT
Start: 2023-04-13

## 2023-04-13 NOTE — PROGRESS NOTES
Subjective   Chief Complaint   Patient presents with   • Back Pain     Lower back/ cramps   • Shoulder Pain     Right shoulder   2 months   • Weight Gain   • ADD   • Med Refill     Jovany Taveras is a 58 y.o. male.     Patient Care Team:  Debra Frazier MD as PCP - General    History of Present Illness  He is coming in today to address some of his symptoms and concerns.  He has been dealing with chronic lower back pain on and off for some time.  His job is very physical and certain activities cause flareup.  At times it is so bad that he has to miss work.  He noted that taking a hot bath at that time really helps.  He is on meloxicam.  He would like to get refill on tramadol which he takes only as needed and fairly sporadically.  He also noted for the last few months some pain with movement in the right shoulder.  He denies any injury.  No weakness reported.  He previously had rotator cuff repair on the left side about 2 years ago.  He is currently being treated for ADD and he is on Adderall.  He has gained some weight over the last couple of years and has hard time to lose it.  He would like to get fasting blood work done.       The following portions of the patient's history were reviewed and updated as appropriate: allergies, current medications, past family history, past medical history, past social history, past surgical history and problem list.  Past Medical History:   Diagnosis Date   • ADD (attention deficit disorder)    • Allergic    • Allergic rhinitis    • Bulging lumbar disc    • Chlamydia infection    • Genital warts    • Osteoarthritis    • Rotator cuff tear    • Vitamin D deficiency      Past Surgical History:   Procedure Laterality Date   • COLONOSCOPY  09/10/2018    polyps removed; 5 years repeat   • SHOULDER ARTHROSCOPY W/ ROTATOR CUFF REPAIR Left 2/19/2021    Procedure: SHOULDER ARTHROSCOPY, BANKART REPAIR AND ROTATOR CUFF REPAIR;  Surgeon: Wan Mccormick MD;  Location: [facility]  "OR;  Service: Orthopedics;  Laterality: Left;   • SHOULDER SURGERY Left 02/19/2021    scope/ bankart repair   • TONSILLECTOMY       The patient has a family history of  Family History   Problem Relation Age of Onset   • Hypertension Father    • Osteoarthritis Father    • Osteoporosis Father      Social History     Socioeconomic History   • Marital status: Single   Tobacco Use   • Smoking status: Never   • Smokeless tobacco: Never   Vaping Use   • Vaping Use: Never used   Substance and Sexual Activity   • Alcohol use: Yes     Alcohol/week: 6.0 standard drinks     Types: 6 Cans of beer per week   • Drug use: No   • Sexual activity: Yes     Partners: Female     Birth control/protection: I.U.D.       Review of Systems   Constitutional: Negative for activity change, fatigue and fever.   Respiratory: Negative for shortness of breath and wheezing.    Cardiovascular: Negative for chest pain, palpitations and leg swelling.   Musculoskeletal: Positive for arthralgias and back pain.   Skin: Negative for rash.   Neurological: Negative for tremors and headache.   Psychiatric/Behavioral: Positive for decreased concentration.     Visit Vitals  /73 (BP Location: Right arm, Patient Position: Sitting, Cuff Size: Adult)   Pulse 64   Temp 97.8 °F (36.6 °C) (Temporal)   Resp 16   Ht 175.3 cm (69\")   Wt 93.1 kg (205 lb 3.2 oz)   SpO2 98%   BMI 30.30 kg/m²       BMI is >= 30 and <35. (Class 1 Obesity). The following options were offered after discussion;: exercise counseling/recommendations      Current Outpatient Medications:   •  amphetamine-dextroamphetamine (ADDERALL) 20 MG tablet, Take 1 tablet by mouth 2 (Two) Times a Day., Disp: 60 tablet, Rfl: 0  •  betamethasone, augmented, (DIPROLENE) 0.05 % gel, Apply 1 application topically to the appropriate area as directed 2 (Two) Times a Day., Disp: 15 g, Rfl: 0  •  cyclobenzaprine (FLEXERIL) 10 MG tablet, Take 1 tablet by mouth At Night As Needed for Muscle Spasms., Disp: 20 tablet, " Rfl: 0  •  meloxicam (MOBIC) 15 MG tablet, TAKE 1 TABLET BY MOUTH DAILY, Disp: 90 tablet, Rfl: 0  •  pantoprazole (PROTONIX) 40 MG EC tablet, TAKE 1 TABLET BY MOUTH EVERY DAY, Disp: 90 tablet, Rfl: 0  •  traMADol (ULTRAM) 50 MG tablet, Take 1 tablet by mouth Every 6 (Six) Hours As Needed for Moderate Pain., Disp: 20 tablet, Rfl: 0    Objective   Physical Exam  Constitutional:       General: He is not in acute distress.     Appearance: Normal appearance. He is well-developed. He is not ill-appearing or diaphoretic.      Comments: Patient is in no distress, patient has normal voice and speech.  Normal respiratory effort.   HENT:      Head: Normocephalic and atraumatic.   Pulmonary:      Effort: Pulmonary effort is normal.   Musculoskeletal:      Cervical back: Normal range of motion and neck supple.      Comments: Slightly decreased range of motion in the right shoulder due to pain.   Neurological:      General: No focal deficit present.      Mental Status: He is alert and oriented to person, place, and time. Mental status is at baseline.   Psychiatric:         Mood and Affect: Mood normal.         Assessment & Plan   Diagnoses and all orders for this visit:    1. Chronic bilateral low back pain with bilateral sciatica (Primary)  -     Cancel: XR Shoulder 2+ View Right; Future  -     Cancel: Ambulatory Referral to Physical Therapy Evaluate and treat  -     traMADol (ULTRAM) 50 MG tablet; Take 1 tablet by mouth Every 6 (Six) Hours As Needed for Moderate Pain.  Dispense: 20 tablet; Refill: 0    2. Chronic right shoulder pain  -     XR Shoulder 2+ View Right; Future  -     Ambulatory Referral to Physical Therapy Evaluate and treat    3. Gallbladder polyp  -     US Gallbladder    4. Mixed hyperlipidemia  -     Comprehensive Metabolic Panel  -     Lipid Panel  -     TSH  -     Testosterone; Future    5. Hyperglycemia  -     Hemoglobin A1c  -     Testosterone; Future    6. Attention deficit disorder (ADD) without  hyperactivity      I reviewed his symptoms and concerns.  I will be referring him to physical therapy to work on the right shoulder as a suspect that his symptoms are due to rotator cuff pathology, possible impingement.  No weakness noted on exam today.  I also refilled his tramadol which he may take as needed for pain and he takes this fairly sporadically.  He is already on meloxicam.  I will be getting fasting blood work.  He I also addressed his right upper quadrant ultrasound done in 4/2022 which showed small polyp in the gallbladder.  I will be ordering follow-up ultrasound for the surveillance.        Return in about 6 months (around 10/13/2023).    Requested Prescriptions     Signed Prescriptions Disp Refills   • traMADol (ULTRAM) 50 MG tablet 20 tablet 0     Sig: Take 1 tablet by mouth Every 6 (Six) Hours As Needed for Moderate Pain.

## 2023-04-14 ENCOUNTER — TELEPHONE (OUTPATIENT)
Dept: FAMILY MEDICINE CLINIC | Facility: CLINIC | Age: 58
End: 2023-04-14

## 2023-04-14 NOTE — TELEPHONE ENCOUNTER
Hub staff attempted to follow warm transfer process and was unsuccessful     Caller: Jovany Taveras    Relationship to patient: Self    Best call back number: [phone]    Patient is needing: RETURNING ANGIES CALL FOR TEST RESULTS    PLEASE ADVISE

## 2023-04-20 ENCOUNTER — TREATMENT (OUTPATIENT)
Dept: PHYSICAL THERAPY | Facility: CLINIC | Age: 58
End: 2023-04-20
Payer: COMMERCIAL

## 2023-04-20 DIAGNOSIS — M25.511 CHRONIC RIGHT SHOULDER PAIN: Primary | ICD-10-CM

## 2023-04-20 DIAGNOSIS — G89.29 CHRONIC RIGHT SHOULDER PAIN: Primary | ICD-10-CM

## 2023-04-20 PROCEDURE — 97140 MANUAL THERAPY 1/> REGIONS: CPT | Performed by: PHYSICAL THERAPIST

## 2023-04-20 PROCEDURE — 97110 THERAPEUTIC EXERCISES: CPT | Performed by: PHYSICAL THERAPIST

## 2023-04-20 PROCEDURE — 97162 PT EVAL MOD COMPLEX 30 MIN: CPT | Performed by: PHYSICAL THERAPIST

## 2023-04-20 NOTE — PROGRESS NOTES
Physical Therapy Initial Evaluation and Plan of Care  Office: [address], [place] [address]  P: [phone]  F: [phone]    Patient: Jovany Taveras   : 1965  Diagnosis/ICD-10 Code:  Chronic right shoulder pain [M25.511, G89.29]  Referring practitioner: Debra Frazier MD  Date of Initial Visit: 2023  Today's Date: 2023  Patient seen for 1 sessions           Subjective Questionnaire: QuickDASH: 20% impairment; work subscale: 38% impairment      Subjective Evaluation    History of Present Illness  Mechanism of injury: Pt reports that he is having R shoulder pain. Pain began ~2.5 months ago. Just noticed that it hurt to raise the arm one day. No specific VÍCTOR. Has noticed that his pain is a little worse when he's dehydrated. Has been trying to drink more water but difficult at work. Usually has a catch in the shoulder when he tries to raise it to the front. Pain is on the top/front of shoulder. He did have RTC repair on the L shoulder a few years ago and it's doing well. Has had multiple episodes of shoulder pain on the R over the last 10 years or so. Pt also plays volleyball and shoulder seems to be doing fine with that. Pain level depends on the day. Hurts to get into a throwing motion. He can lift it, just feels like he's strained a muscle. Sometimes hurts so bad that he gets anxiety about moving the arm because he is expecting the pain. X-ray showed mild arthritis. No MRI at this time. No n/t. Pain isn't walking him up at night.     Pain  Current pain ratin  At best pain ratin  At worst pain ratin  Quality: sharp  Alleviating factors: meloxicam.  Aggravating factors: overhead activity, lifting, movement and outstretched reach    Hand dominance: right    Diagnostic Tests  Abnormal x-ray: see imaging.    Patient Goals  Patient goals for therapy: increased motion, decreased pain, return to sport/leisure activities and independence with ADLs/IADLs  Patient goal: be  [facility] [employer], continue to be able to play volleyball         Past Medical History:   Diagnosis Date   • ADD (attention deficit disorder)    • Allergic    • Allergic rhinitis    • Bulging lumbar disc    • Chlamydia infection    • Genital warts    • Osteoarthritis    • Rotator cuff tear    • Vitamin D deficiency         Past Surgical History:   Procedure Laterality Date   • COLONOSCOPY  09/10/2018    polyps removed; 5 years repeat   • SHOULDER ARTHROSCOPY W/ ROTATOR CUFF REPAIR Left 2/19/2021    Procedure: SHOULDER ARTHROSCOPY, BANKART REPAIR AND ROTATOR CUFF REPAIR;  Surgeon: Wan Mccormick MD;  Location: [facility] MAIN OR;  Service: Orthopedics;  Laterality: Left;   • SHOULDER SURGERY Left 02/19/2021    scope/ bankart repair   • TONSILLECTOMY          Objective          Postural Observations  Seated posture: fair  Standing posture: fair        Palpation     Additional Palpation Details  General tightness noted in shoulder musculature at rest    Tenderness     Right Shoulder  No tenderness     Cervical/Thoracic Screen   Thoracic range of motion within normal limits with the following exceptions: Min-mod hypomobility of thoracic spine with PA's in prone     Neurological Testing     Sensation     Shoulder   Left Shoulder   Intact: light touch    Right Shoulder   Intact: light touch    Active Range of Motion   Left Shoulder   Normal active range of motion    Right Shoulder   Flexion: WFL  Abduction: WFL and with pain  External rotation 45°: 68 degrees with pain    Additional Active Range of Motion Details  Pain ~ degrees of shoulder abd and slight with shoulder flex in same range    Joint Play     Right Shoulder  Hypomobile in the posterior capsule and inferior capsule.     Strength/Myotome Testing     Left Shoulder   Normal muscle strength    Isolated Muscles   Lower trapezius: 4   Middle trapezius: 4     Right Shoulder     Planes of Motion   Flexion: 4+   Abduction: 4+   External  rotation at 0°: 4+   Internal rotation at 0°: 4+     Isolated Muscles   Lower trapezius: 4-   Middle trapezius: 4-     Tests     Right Shoulder   Positive painful arc.   Negative empty can and Hawkin's.           Assessment & Plan     Assessment  Impairments: abnormal coordination, abnormal muscle firing, abnormal muscle tone, abnormal or restricted ROM, activity intolerance, impaired physical strength, lacks appropriate home exercise program, pain with function and weight-bearing intolerance  Functional Limitations: carrying objects, lifting, sleeping, pulling, pushing, uncomfortable because of pain, reaching behind back and reaching overhead  Assessment details: Pt is a 58 year old male with c/o R shoulder pain. Pt demonstrates poor scapular mobility as well as decreased mobility of thoracic spine which could be affecting overall shoulder mechanics. Min weakness noted in the R shoulder, however more weakness with mid and lower trap B with R worse than L. Pt has +painful arc. Functional limitations are listed above. Pt will benefit from PT in order to address impairments and improve overall function.     Prognosis: good    Goals  Plan Goals: STG (3 weeks):  Pt will demonstrate increased activation of scap stabilizers during activities/exercises to decrease load on shoulder.   Pt will display improved ROM of shoulder to WFL with min to no pain to assist with functional tasks.     LTG (6 weeks):  Pt will be independent with home exercises to assist with improved strength and continue to improve function.   Pt will demonstrate decreased score on the QuickDASH to 10% and quickdash work subscale to 16% to demonstrate decreased overall impairment.   Pt will be able to perform OH reaching and lifting with mod weight with no increased shoulder pain.   Pt will demonstrate improved shoulder and scapular strength to 4+/5 overall to assist with function.        Plan  Therapy options: will be seen for skilled therapy  services  Planned modality interventions: cryotherapy, TENS and thermotherapy (hydrocollator packs)  Planned therapy interventions: ADL retraining, body mechanics training, fine motor coordination training, flexibility, functional ROM exercises, home exercise program, joint mobilization, manual therapy, motor coordination training, neuromuscular re-education, postural training, soft tissue mobilization, spinal/joint mobilization, strengthening, stretching and therapeutic activities  Frequency: 2x week  Duration in weeks: 12  Treatment plan discussed with: patient        HEP:   Access Code: [email or web address]  URL: [email or web address]  Date: 04/20/2023  Prepared by: Letty Mcmahan    Exercises  - Seated Scapular Retraction  - 2 x daily - 10 reps - 5 sec hold  - Seated Flexion Stretch with Swiss Ball  - 2 x daily - 10 reps - 5 sec hold  - Standing shoulder flexion wall slides  - 2 x daily - 10 reps - 5 sec hold  - Seated Thoracic Lumbar Extension  - 2 x daily - 10 reps - 5 sec hold    History # of Personal Factors and/or Comorbidities: MODERATE (1-2)  Examination of Body System(s): # of elements: MODERATE (3)  Clinical Presentation: EVOLVING  Clinical Decision Making: MODERATE      Eval:  Low Eval          Mins  54710  Mod Eval     30     Mins  97414  High Eval                            Mins  70115    Timed:         Manual Therapy:    10     mins  17163;     Therapeutic Exercise:    10     mins  34827;     Neuromuscular Gilberto:        mins  71733;    Therapeutic Activity:          mins  98791;     Gait Training:           mins  54178;       Un-Timed:  Electrical Stimulation:         mins  12645 (MC );  Traction          mins 94210      Timed Treatment:   20   mins   Total Treatment:     50   mins    PT SIGNATURE: Letty Mcmahan PT, DPT, OCS           [place] License # [id number]              DATE TREATMENT INITIATED: 4/20/2023    Initial Certification  Certification Period: 7/18/2023  I certify that the therapy  services are furnished while this patient is under my care.  The services outlined above are required by this patient, and will be reviewed every 90 days.     PHYSICIAN: Debra Frazier MD      DATE:     Please sign and return via fax to [phone].. Thank you, [facility].

## 2023-04-26 ENCOUNTER — HOSPITAL ENCOUNTER (OUTPATIENT)
Dept: ULTRASOUND IMAGING | Facility: HOSPITAL | Age: 58
Discharge: HOME | End: 2023-04-26
Admitting: FAMILY MEDICINE
Payer: COMMERCIAL

## 2023-04-26 PROCEDURE — 76705 ECHO EXAM OF ABDOMEN: CPT

## 2023-05-03 DIAGNOSIS — F98.8 ATTENTION DEFICIT DISORDER (ADD) WITHOUT HYPERACTIVITY: ICD-10-CM

## 2023-05-03 RX ORDER — DEXTROAMPHETAMINE SACCHARATE, AMPHETAMINE ASPARTATE, DEXTROAMPHETAMINE SULFATE AND AMPHETAMINE SULFATE 5; 5; 5; 5 MG/1; MG/1; MG/1; MG/1
20 TABLET ORAL 2 TIMES DAILY
Qty: 60 TABLET | Refills: 0 | Status: SHIPPED | OUTPATIENT
Start: 2023-05-03

## 2023-05-12 NOTE — TELEPHONE ENCOUNTER
Obtained verbal consent for Fluoxetine. 5/3/23 @2079   Prescription was sent to the pharmacy. Please notify the patient. Thank you.

## 2023-06-06 ENCOUNTER — OFFICE VISIT (OUTPATIENT)
Dept: FAMILY MEDICINE CLINIC | Facility: CLINIC | Age: 58
End: 2023-06-06
Payer: COMMERCIAL

## 2023-06-06 VITALS
HEIGHT: 69 IN | OXYGEN SATURATION: 98 % | HEART RATE: 58 BPM | TEMPERATURE: 97.8 F | WEIGHT: 209 LBS | BODY MASS INDEX: 30.96 KG/M2 | RESPIRATION RATE: 16 BRPM

## 2023-06-06 DIAGNOSIS — F98.8 ATTENTION DEFICIT DISORDER (ADD) WITHOUT HYPERACTIVITY: ICD-10-CM

## 2023-06-06 DIAGNOSIS — L23.7 POISON IVY DERMATITIS: Primary | ICD-10-CM

## 2023-06-06 DIAGNOSIS — G89.29 CHRONIC RIGHT SHOULDER PAIN: ICD-10-CM

## 2023-06-06 DIAGNOSIS — M25.511 CHRONIC RIGHT SHOULDER PAIN: ICD-10-CM

## 2023-06-06 PROBLEM — L30.9 DERMATITIS: Status: RESOLVED | Noted: 2021-11-19 | Resolved: 2023-06-06

## 2023-06-06 PROCEDURE — 99213 OFFICE O/P EST LOW 20 MIN: CPT | Performed by: FAMILY MEDICINE

## 2023-06-06 RX ORDER — DEXTROAMPHETAMINE SACCHARATE, AMPHETAMINE ASPARTATE, DEXTROAMPHETAMINE SULFATE AND AMPHETAMINE SULFATE 5; 5; 5; 5 MG/1; MG/1; MG/1; MG/1
20 TABLET ORAL 2 TIMES DAILY
Qty: 60 TABLET | Refills: 0 | Status: SHIPPED | OUTPATIENT
Start: 2023-06-06

## 2023-06-06 RX ORDER — METHYLPREDNISOLONE 4 MG/1
TABLET ORAL
Qty: 1 EACH | Refills: 0
Start: 2023-06-06

## 2023-06-06 RX ORDER — METHYLPREDNISOLONE 4 MG/1
TABLET ORAL
Qty: 1 EACH | Refills: 0 | Status: SHIPPED | OUTPATIENT
Start: 2023-06-06 | End: 2023-06-06 | Stop reason: SDUPTHER

## 2023-06-06 RX ORDER — METHYLPREDNISOLONE SODIUM SUCCINATE 125 MG/2ML
125 INJECTION, POWDER, LYOPHILIZED, FOR SOLUTION INTRAMUSCULAR; INTRAVENOUS EVERY 6 HOURS
Status: SHIPPED | OUTPATIENT
Start: 2023-06-06

## 2023-06-06 RX ORDER — METHYLPREDNISOLONE SODIUM SUCCINATE 125 MG/2ML
125 INJECTION, POWDER, LYOPHILIZED, FOR SOLUTION INTRAMUSCULAR; INTRAVENOUS EVERY 6 HOURS
Status: DISCONTINUED | OUTPATIENT
Start: 2023-06-06 | End: 2023-06-06

## 2023-06-06 NOTE — PROGRESS NOTES
Subjective   Chief Complaint   Patient presents with    Poison Ivy     Started yesterday    Shoulder Pain     Jovany Taveras is a 58 y.o. male.     Patient Care Team:  Debra Frazier MD as PCP - General    History of Present Illness  He is coming in today due to skin issues.  He reports that for the last 2.5 weeks he has been dealing with poison ivy and the rash is located on his arms and spreading into the neck and face.  He has tried over-the-counter remedies.  He would like this to be addressed today.  He is getting  this coming Saturday.  He has dealt with poison ivy issues on and off for all of his life.  He also wants to talk about the right shoulder problems.  He has been experiencing some pain in the right shoulder for the last several months.  Today while at work after doing a sudden movement with her right shoulder the pain intensified and gets worse with certain movement.  He previously had issues with left shoulder and even had a surgery due to torn rotator cuff.     The following portions of the patient's history were reviewed and updated as appropriate: allergies, current medications, past family history, past medical history, past social history, past surgical history, and problem list.  Past Medical History:   Diagnosis Date    ADD (attention deficit disorder)     Allergic     Allergic rhinitis     Bulging lumbar disc     Chlamydia infection     Genital warts     Osteoarthritis     Rotator cuff tear     Vitamin D deficiency      Past Surgical History:   Procedure Laterality Date    COLONOSCOPY  09/10/2018    polyps removed; 5 years repeat    SHOULDER ARTHROSCOPY W/ ROTATOR CUFF REPAIR Left 2/19/2021    Procedure: SHOULDER ARTHROSCOPY, BANKART REPAIR AND ROTATOR CUFF REPAIR;  Surgeon: Wan Mccormick MD;  [facility] MA[facility] OR;  Service: Orthopedics;  Laterality: Left;    SHOULDER SURGERY Left 02/19/2021    scope/ bankart repair    TONSILLECTOMY       The patient has a family  "history of  Family History   Problem Relation Age of Onset    Hypertension Father     Osteoarthritis Father     Osteoporosis Father      Social History     Socioeconomic History    Marital status: Single   Tobacco Use    Smoking status: Never    Smokeless tobacco: Never   Vaping Use    Vaping Use: Never used   Substance and Sexual Activity    Alcohol use: Yes     Alcohol/week: 6.0 standard drinks     Types: 6 Cans of beer per week    Drug use: No    Sexual activity: Yes     Partners: Female     Birth control/protection: I.U.D.       Review of Systems   Constitutional:  Negative for chills and fever.   Musculoskeletal:  Positive for arthralgias.   Skin:  Positive for rash. Negative for color change, dry skin and skin lesions.   Visit Vitals  Pulse 58   Temp 97.8 °F (36.6 °C) (Infrared)   Resp 16   Ht 175.3 cm (69\")   Wt 94.8 kg (209 lb)   SpO2 98%   BMI 30.86 kg/m²              Current Outpatient Medications:     amphetamine-dextroamphetamine (ADDERALL) 20 MG tablet, Take 1 tablet by mouth 2 (Two) Times a Day., Disp: 60 tablet, Rfl: 0    betamethasone, augmented, (DIPROLENE) 0.05 % gel, Apply 1 application topically to the appropriate area as directed 2 (Two) Times a Day., Disp: 15 g, Rfl: 0    cyclobenzaprine (FLEXERIL) 10 MG tablet, Take 1 tablet by mouth At Night As Needed for Muscle Spasms., Disp: 20 tablet, Rfl: 0    meloxicam (MOBIC) 15 MG tablet, TAKE 1 TABLET BY MOUTH DAILY, Disp: 90 tablet, Rfl: 0    methylPREDNISolone (Medrol) 4 MG dose pack, Take as directed on package instructions., Disp: 1 each, Rfl: 0    pantoprazole (PROTONIX) 40 MG EC tablet, TAKE 1 TABLET BY MOUTH EVERY DAY, Disp: 90 tablet, Rfl: 0    traMADol (ULTRAM) 50 MG tablet, Take 1 tablet by mouth Every 6 (Six) Hours As Needed for Moderate Pain., Disp: 20 tablet, Rfl: 0    Current Facility-Administered Medications:     methylPREDNISolone sodium succinate (SOLU-Medrol) injection 125 mg, 125 mg, Intravenous, Q6H, Debra Frazier, " MD    Objective   Physical Exam  Constitutional:       General: He is not in acute distress.     Appearance: Normal appearance. He is well-developed. He is not ill-appearing or diaphoretic.      Comments: Patient is in no distress, patient has normal voice and speech.  Normal respiratory effort.   HENT:      Head: Normocephalic and atraumatic.   Pulmonary:      Effort: Pulmonary effort is normal.   Musculoskeletal:      Cervical back: Normal range of motion and neck supple.      Comments: Slightly decreased range of motion in the right shoulder with pain reported with movement against the resistance.  No weakness.  No joint line palpation tenderness.   Skin:     Comments: There is macular vesicular rash noted on both arms.  No signs of infection.  No petechiae.   Neurological:      General: No focal deficit present.      Mental Status: He is alert and oriented to person, place, and time. Mental status is at baseline.   Psychiatric:         Mood and Affect: Mood normal.       Assessment & Plan   Diagnoses and all orders for this visit:    1. Poison ivy dermatitis (Primary)  -     Discontinue: methylPREDNISolone (Medrol) 4 MG dose pack; Take as directed on package instructions.  Dispense: 1 each; Refill: 0  -     methylPREDNISolone sodium succinate (SOLU-Medrol) injection 125 mg  -     Discontinue: methylPREDNISolone sodium succinate (SOLU-Medrol) injection 125 mg  -     methylPREDNISolone (Medrol) 4 MG dose pack; Take as directed on package instructions.  Dispense: 1 each; Refill: 0    2. Chronic right shoulder pain  -     MRI Shoulder Right Without Contrast; Future      Patient was given Solu-Medrol 125 mg IM today.  I also given prescription for the Medrol Dosepak which she was instructed to start taking tomorrow.  He already has a topical steroid cream which she can use as needed.  I also addressed his right shoulder pain.  I reviewed x-ray results which was done in 4/2023.  Considering worsening of the pain and  the exam findings I will be scheduling MRI of the right shoulder to further evaluate soft tissue.    Return if symptoms worsen or fail to improve, for Recheck.    Requested Prescriptions     Signed Prescriptions Disp Refills    methylPREDNISolone (Medrol) 4 MG dose pack 1 each 0     Sig: Take as directed on package instructions.

## 2023-06-11 RX ORDER — MELOXICAM 15 MG/1
15 TABLET ORAL DAILY
Qty: 90 TABLET | Refills: 0 | Status: SHIPPED | OUTPATIENT
Start: 2023-06-11

## 2023-07-27 ENCOUNTER — PREP FOR SURGERY (OUTPATIENT)
Dept: OTHER | Facility: HOSPITAL | Age: 58
End: 2023-07-27
Payer: COMMERCIAL

## 2023-07-27 ENCOUNTER — OFFICE VISIT (OUTPATIENT)
Dept: ORTHOPEDIC SURGERY | Facility: CLINIC | Age: 58
End: 2023-07-27
Payer: COMMERCIAL

## 2023-07-27 VITALS — HEART RATE: 62 BPM | WEIGHT: 210.8 LBS | HEIGHT: 69 IN | BODY MASS INDEX: 31.22 KG/M2

## 2023-07-27 DIAGNOSIS — M75.121 COMPLETE TEAR OF RIGHT ROTATOR CUFF, UNSPECIFIED WHETHER TRAUMATIC: Primary | ICD-10-CM

## 2023-07-27 DIAGNOSIS — M75.111 NONTRAUMATIC INCOMPLETE TEAR OF RIGHT ROTATOR CUFF: Primary | ICD-10-CM

## 2023-07-27 PROCEDURE — 99214 OFFICE O/P EST MOD 30 MIN: CPT | Performed by: ORTHOPAEDIC SURGERY

## 2023-07-27 NOTE — PROGRESS NOTES
Patient ID: Jovany Taveras is a 58 y.o. male.    Chief Complaint:    Chief Complaint   Patient presents with    Right Shoulder - Initial Evaluation, Pain     Pain 4        HPI:  This is a 58-year-old gentleman here with about a year of right shoulder pain that is worse in the last 6 months though.  He does not recall a specific injury.  He has pain deep in the shoulder referring to the bicep or deltoid especially with overhead activity and at night.  He is taken oral medication use ice heat and rest with activity modification at work but pain continues with almost any reaching or overhead activity  Past Medical History:   Diagnosis Date    ADD (attention deficit disorder)     Allergic     Allergic rhinitis     Bulging lumbar disc     Chlamydia infection     Colon polyp 1989    Genital warts     Heart murmur 1979    Osteoarthritis     Rotator cuff tear     Vitamin D deficiency        Past Surgical History:   Procedure Laterality Date    COLONOSCOPY  09/10/2018    polyps removed; 5 years repeat    SHOULDER ARTHROSCOPY W/ ROTATOR CUFF REPAIR Left 2/19/2021    Procedure: SHOULDER ARTHROSCOPY, BANKART REPAIR AND ROTATOR CUFF REPAIR;  Surgeon: Wan Mccormick MD;  Location: [facility];  Service: Orthopedics;  Laterality: Left;    SHOULDER SURGERY Left 02/19/2021    scope/ bankart repair    TONSILLECTOMY         Family History   Problem Relation Age of Onset    Hypertension Father     Osteoarthritis Father     Osteoporosis Father           Social History     Occupational History    Not on file   Tobacco Use    Smoking status: Never    Smokeless tobacco: Never    Tobacco comments:     Never!   Vaping Use    Vaping Use: Never used   Substance and Sexual Activity    Alcohol use: Yes     Alcohol/week: 6.0 standard drinks     Types: 6 Cans of beer per week    Drug use: No    Sexual activity: Yes     Partners: Female     Birth control/protection: I.U.D.      Review of Systems   Cardiovascular:  Negative for  "chest pain.   Musculoskeletal:  Positive for arthralgias.     Objective:    Pulse 62   Ht 175.3 cm (69\")   Wt 95.6 kg (210 lb 12.8 oz)   BMI 31.13 kg/m²     Physical Examination:  Right shoulder demonstrates intact skin no areas of tenderness passive elevation 170 abduction 150 external rotation 50 internal rotation L5 with pain and weakness on Speed, [place], and supraspinatus testing.  Belly press and liftoff are 5/5.  Sensory and motor exam are intact all distributions. Radial pulse is palpable and capillary refill is less than two seconds to all digits.    Imaging:  Prior x-ray and MRI of the right shoulder reviewed demonstrate well-maintained joint spaces of the high-grade 80 to 90% tear of the anterior supraspinatus at about a 50% infraspinatus tear partial tearing of the upper subscapularis and possible interstitial bicep tendon tearing    Assessment:  Right shoulder near full-thickness cuff tear and bicep tendinopathy    Plan:  Conservative or surgical options discussed he would like to proceed with right shoulder arthroscopy with rotator cuff repair possible bicep tenodesis  Risks and benefits, specifically risks of bleeding, scar, infection, fracture, stiffness, retear, nerve, tendon or artery damage, the need for further surgery, DVT, and loss of life or limb and rehab were discussed. All questions were answered and addressed.      Procedures         Disclaimer: Part of this note may be an electronic transcription/translation of spoken language to printed text using the Dragon Dictation System  "

## 2023-08-28 DIAGNOSIS — M54.41 CHRONIC BILATERAL LOW BACK PAIN WITH BILATERAL SCIATICA: ICD-10-CM

## 2023-08-28 DIAGNOSIS — M54.42 CHRONIC BILATERAL LOW BACK PAIN WITH BILATERAL SCIATICA: ICD-10-CM

## 2023-08-28 DIAGNOSIS — G89.29 CHRONIC BILATERAL LOW BACK PAIN WITH BILATERAL SCIATICA: ICD-10-CM

## 2023-08-28 DIAGNOSIS — F98.8 ATTENTION DEFICIT DISORDER (ADD) WITHOUT HYPERACTIVITY: ICD-10-CM

## 2023-08-28 RX ORDER — DEXTROAMPHETAMINE SACCHARATE, AMPHETAMINE ASPARTATE, DEXTROAMPHETAMINE SULFATE AND AMPHETAMINE SULFATE 5; 5; 5; 5 MG/1; MG/1; MG/1; MG/1
20 TABLET ORAL 2 TIMES DAILY
Qty: 60 TABLET | Refills: 0 | Status: SHIPPED | OUTPATIENT
Start: 2023-08-28

## 2023-08-28 RX ORDER — TRAMADOL HYDROCHLORIDE 50 MG/1
50 TABLET ORAL EVERY 6 HOURS PRN
Qty: 20 TABLET | Refills: 0 | Status: SHIPPED | OUTPATIENT
Start: 2023-08-28

## 2023-09-28 DIAGNOSIS — F98.8 ATTENTION DEFICIT DISORDER (ADD) WITHOUT HYPERACTIVITY: ICD-10-CM

## 2023-09-28 RX ORDER — DEXTROAMPHETAMINE SACCHARATE, AMPHETAMINE ASPARTATE, DEXTROAMPHETAMINE SULFATE AND AMPHETAMINE SULFATE 5; 5; 5; 5 MG/1; MG/1; MG/1; MG/1
20 TABLET ORAL 2 TIMES DAILY
Qty: 60 TABLET | Refills: 0 | Status: SHIPPED | OUTPATIENT
Start: 2023-09-28

## 2023-10-10 PROBLEM — M75.111 NONTRAUMATIC INCOMPLETE TEAR OF RIGHT ROTATOR CUFF: Status: ACTIVE | Noted: 2023-07-27

## 2023-10-24 RX ORDER — MELOXICAM 15 MG/1
15 TABLET ORAL DAILY
Qty: 90 TABLET | Refills: 0 | Status: SHIPPED | OUTPATIENT
Start: 2023-10-24

## 2023-11-02 DIAGNOSIS — F98.8 ATTENTION DEFICIT DISORDER (ADD) WITHOUT HYPERACTIVITY: ICD-10-CM

## 2023-11-02 RX ORDER — DEXTROAMPHETAMINE SACCHARATE, AMPHETAMINE ASPARTATE, DEXTROAMPHETAMINE SULFATE AND AMPHETAMINE SULFATE 5; 5; 5; 5 MG/1; MG/1; MG/1; MG/1
20 TABLET ORAL 2 TIMES DAILY
Qty: 60 TABLET | Refills: 0 | Status: SHIPPED | OUTPATIENT
Start: 2023-11-02

## 2023-12-07 ENCOUNTER — OFFICE VISIT (OUTPATIENT)
Dept: FAMILY MEDICINE CLINIC | Facility: CLINIC | Age: 58
End: 2023-12-07
Payer: COMMERCIAL

## 2023-12-07 VITALS
HEIGHT: 70 IN | WEIGHT: 204.2 LBS | RESPIRATION RATE: 16 BRPM | BODY MASS INDEX: 29.23 KG/M2 | DIASTOLIC BLOOD PRESSURE: 70 MMHG | TEMPERATURE: 97.5 F | SYSTOLIC BLOOD PRESSURE: 104 MMHG | HEART RATE: 66 BPM | OXYGEN SATURATION: 98 %

## 2023-12-07 DIAGNOSIS — F98.8 ATTENTION DEFICIT DISORDER (ADD) WITHOUT HYPERACTIVITY: ICD-10-CM

## 2023-12-07 DIAGNOSIS — M54.9 UPPER BACK PAIN ON LEFT SIDE: Primary | ICD-10-CM

## 2023-12-07 PROCEDURE — 99213 OFFICE O/P EST LOW 20 MIN: CPT | Performed by: FAMILY MEDICINE

## 2023-12-07 RX ORDER — DEXTROAMPHETAMINE SACCHARATE, AMPHETAMINE ASPARTATE, DEXTROAMPHETAMINE SULFATE AND AMPHETAMINE SULFATE 5; 5; 5; 5 MG/1; MG/1; MG/1; MG/1
20 TABLET ORAL 2 TIMES DAILY
Qty: 60 TABLET | Refills: 0 | Status: SHIPPED | OUTPATIENT
Start: 2023-12-07

## 2023-12-07 RX ORDER — CYCLOBENZAPRINE HCL 10 MG
10 TABLET ORAL NIGHTLY PRN
Qty: 20 TABLET | Refills: 0 | Status: SHIPPED | OUTPATIENT
Start: 2023-12-07

## 2023-12-07 RX ORDER — METHYLPREDNISOLONE 4 MG/1
TABLET ORAL
Start: 2023-12-07

## 2023-12-07 NOTE — PROGRESS NOTES
Subjective   Chief Complaint   Patient presents with    Back Pain     Upper left scapula into neck and down left arm since Thanksgiving morning    ADD    Med Refill     Jovany Taveras is a 58 y.o. male.     Patient Care Team:  Debra Frazier MD as PCP - General    History of Present Illness  He is coming in today due to left upper back pain which she has been experiencing since Thanksgiving.  It is located around the left scapula and at times radiating to the neck and even upper arm.  He denies any triggers, but certain positions make it worse.  He denies any trauma.  He reports that he woke up with this pain and he feels that he somehow injured himself while sleeping.  No numbness or tingling reported.  Since the pain started he had a massage and he even went to see a chiropractor 2 times for adjustment, but nothing is helping.  He also needs a refill on Adderall, he has been treated for ADD for many years and he has been on Adderall for a long time, no side effects reported.       The following portions of the patient's history were reviewed and updated as appropriate: allergies, current medications, past family history, past medical history, past social history, past surgical history, and problem list.  Past Medical History:   Diagnosis Date    ADD (attention deficit disorder)     Allergic     Allergic rhinitis     Bulging lumbar disc     Chlamydia infection     Colon polyp 1989    Genital warts     Heart murmur 1979    Osteoarthritis     Rotator cuff tear     Vitamin D deficiency      Past Surgical History:   Procedure Laterality Date    COLONOSCOPY  09/10/2018    polyps removed; 5 years repeat    SHOULDER ARTHROSCOPY W/ ROTATOR CUFF REPAIR Left 2/19/2021    Procedure: SHOULDER ARTHROSCOPY, BANKART REPAIR AND ROTATOR CUFF REPAIR;  Surgeon: Wan Mccormick MD;  Location: [facility] MAIN OR;  Service: Orthopedics;  Laterality: Left;    SHOULDER SURGERY Left 02/19/2021    scope/ bankart repair     "TONSILLECTOMY       The patient has a family history of  Family History   Problem Relation Age of Onset    Hypertension Father     Osteoarthritis Father     Osteoporosis Father      Social History     Socioeconomic History    Marital status:    Tobacco Use    Smoking status: Never    Smokeless tobacco: Never    Tobacco comments:     Never!   Vaping Use    Vaping Use: Never used   Substance and Sexual Activity    Alcohol use: Yes     Alcohol/week: 6.0 standard drinks of alcohol     Types: 6 Cans of beer per week    Drug use: No    Sexual activity: Yes     Partners: Female     Birth control/protection: I.U.D.       Review of Systems   Constitutional:  Negative for fatigue.   Respiratory:  Negative for wheezing.    Musculoskeletal:  Negative for arthralgias and back pain.   Skin:  Negative for rash.   Neurological:  Negative for tremors and headache.     Visit Vitals  /70 (BP Location: Left arm, Patient Position: Sitting, Cuff Size: Adult)   Pulse 66   Temp 97.5 °F (36.4 °C) (Infrared)   Resp 16   Ht 177.8 cm (70\")   Wt 92.6 kg (204 lb 3.2 oz)   SpO2 98%   BMI 29.30 kg/m²              Current Outpatient Medications:     cyclobenzaprine (FLEXERIL) 10 MG tablet, Take 1 tablet by mouth At Night As Needed for Muscle Spasms., Disp: 20 tablet, Rfl: 0    methylPREDNISolone (Medrol) 4 MG dose pack, Take as directed on package instructions., Disp: , Rfl:     amphetamine-dextroamphetamine (ADDERALL) 20 MG tablet, Take 1 tablet by mouth 2 (Two) Times a Day., Disp: 60 tablet, Rfl: 0    betamethasone, augmented, (DIPROLENE) 0.05 % gel, Apply 1 application topically to the appropriate area as directed 2 (Two) Times a Day., Disp: 15 g, Rfl: 0    meloxicam (MOBIC) 15 MG tablet, TAKE 1 TABLET BY MOUTH DAILY, Disp: 90 tablet, Rfl: 0    pantoprazole (PROTONIX) 40 MG EC tablet, TAKE 1 TABLET BY MOUTH EVERY DAY, Disp: 90 tablet, Rfl: 0    traMADol (ULTRAM) 50 MG tablet, Take 1 tablet by mouth Every 6 (Six) Hours As Needed for " Moderate Pain., Disp: 20 tablet, Rfl: 0  No current facility-administered medications for this visit.    Objective   Physical Exam  Vitals and nursing note reviewed.   Constitutional:       General: He is not in acute distress.     Appearance: Normal appearance. He is well-developed. He is not ill-appearing.   HENT:      Head: Normocephalic and atraumatic.   Cardiovascular:      Rate and Rhythm: Normal rate and regular rhythm.      Heart sounds: Normal heart sounds. No murmur heard.     No gallop.   Pulmonary:      Effort: Pulmonary effort is normal. No respiratory distress.      Breath sounds: Normal breath sounds. No wheezing, rhonchi or rales.   Chest:      Chest wall: No tenderness.   Musculoskeletal:      Cervical back: Normal range of motion and neck supple.      Comments: Full range of motion in the left shoulder, no weakness.  There is some palpation tenderness over the muscles of the left upper back along the scapula.   Neurological:      General: No focal deficit present.      Mental Status: He is alert and oriented to person, place, and time. Mental status is at baseline.   Psychiatric:         Mood and Affect: Mood normal.         Assessment & Plan   Diagnoses and all orders for this visit:    1. Upper back pain on left side (Primary)  -     cyclobenzaprine (FLEXERIL) 10 MG tablet; Take 1 tablet by mouth At Night As Needed for Muscle Spasms.  Dispense: 20 tablet; Refill: 0  -     methylPREDNISolone (Medrol) 4 MG dose pack; Take as directed on package instructions.    2. 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  Dispense: 60 tablet; Refill: 0      I will be starting him on steroid pack and muscle relaxer.  He is to monitor the symptoms and contact us back if no improvement.  His ADD symptoms are stable and well-controlled.  He may continue Adderall and refill was given.    Return if  symptoms worsen or fail to improve, for Recheck.    Requested Prescriptions     Signed Prescriptions Disp Refills    cyclobenzaprine (FLEXERIL) 10 MG tablet 20 tablet 0     Sig: Take 1 tablet by mouth At Night As Needed for Muscle Spasms.    methylPREDNISolone (Medrol) 4 MG dose pack       Sig: Take as directed on package instructions.    amphetamine-dextroamphetamine (ADDERALL) 20 MG tablet 60 tablet 0     Sig: Take 1 tablet by mouth 2 (Two) Times a Day.

## 2023-12-27 ENCOUNTER — LAB (OUTPATIENT)
Dept: LAB | Facility: HOSPITAL | Age: 58
End: 2023-12-27
Payer: COMMERCIAL

## 2023-12-27 ENCOUNTER — HOSPITAL ENCOUNTER (OUTPATIENT)
Dept: CARDIOLOGY | Facility: HOSPITAL | Age: 58
Discharge: HOME | End: 2023-12-27
Payer: COMMERCIAL

## 2023-12-27 DIAGNOSIS — M75.111 NONTRAUMATIC INCOMPLETE TEAR OF RIGHT ROTATOR CUFF: ICD-10-CM

## 2023-12-27 LAB
ANION GAP SERPL CALCULATED.3IONS-SCNC: 11 MMOL/L (ref 5–15)
APTT PPP: 24.1 SECONDS (ref 24–31)
BASOPHILS # BLD AUTO: 0.06 10*3/MM3 (ref 0–0.2)
BASOPHILS NFR BLD AUTO: 1 % (ref 0–1.5)
BUN SERPL-MCNC: 17 MG/DL (ref 6–20)
BUN/CREAT SERPL: 13.5 (ref 7–25)
CALCIUM SPEC-SCNC: 9.8 MG/DL (ref 8.6–10.5)
CHLORIDE SERPL-SCNC: 104 MMOL/L (ref 98–107)
CO2 SERPL-SCNC: 27 MMOL/L (ref 22–29)
CREAT SERPL-MCNC: 1.26 MG/DL (ref 0.76–1.27)
DEPRECATED RDW RBC AUTO: 42.8 FL (ref 37–54)
EGFRCR SERPLBLD CKD-EPI 2021: 66.9 ML/MIN/1.73
EOSINOPHIL # BLD AUTO: 0.21 10*3/MM3 (ref 0–0.4)
EOSINOPHIL NFR BLD AUTO: 3.4 % (ref 0.3–6.2)
ERYTHROCYTE [DISTWIDTH] IN BLOOD BY AUTOMATED COUNT: 13.3 % (ref 12.3–15.4)
GLUCOSE SERPL-MCNC: 99 MG/DL (ref 65–99)
HCT VFR BLD AUTO: 44.3 % (ref 37.5–51)
HGB BLD-MCNC: 14.9 G/DL (ref 13–17.7)
IMM GRANULOCYTES # BLD AUTO: 0.03 10*3/MM3 (ref 0–0.05)
IMM GRANULOCYTES NFR BLD AUTO: 0.5 % (ref 0–0.5)
INR PPP: 0.94 (ref 0.93–1.1)
LYMPHOCYTES # BLD AUTO: 2.54 10*3/MM3 (ref 0.7–3.1)
LYMPHOCYTES NFR BLD AUTO: 41.4 % (ref 19.6–45.3)
MCH RBC QN AUTO: 29.5 PG (ref 26.6–33)
MCHC RBC AUTO-ENTMCNC: 33.6 G/DL (ref 31.5–35.7)
MCV RBC AUTO: 87.7 FL (ref 79–97)
MONOCYTES # BLD AUTO: 0.45 10*3/MM3 (ref 0.1–0.9)
MONOCYTES NFR BLD AUTO: 7.3 % (ref 5–12)
NEUTROPHILS NFR BLD AUTO: 2.84 10*3/MM3 (ref 1.7–7)
NEUTROPHILS NFR BLD AUTO: 46.4 % (ref 42.7–76)
NRBC BLD AUTO-RTO: 0 /100 WBC (ref 0–0.2)
PLATELET # BLD AUTO: 238 10*3/MM3 (ref 140–450)
PMV BLD AUTO: 10.3 FL (ref 6–12)
POTASSIUM SERPL-SCNC: 5 MMOL/L (ref 3.5–5.2)
PROTHROMBIN TIME: 10.3 SECONDS (ref 9.6–11.7)
QT INTERVAL: 377 MS
QTC INTERVAL: 390 MS
RBC # BLD AUTO: 5.05 10*6/MM3 (ref 4.14–5.8)
SODIUM SERPL-SCNC: 142 MMOL/L (ref 136–145)
WBC NRBC COR # BLD AUTO: 6.13 10*3/MM3 (ref 3.4–10.8)

## 2023-12-27 PROCEDURE — 36415 COLL VENOUS BLD VENIPUNCTURE: CPT

## 2023-12-27 PROCEDURE — 93005 ELECTROCARDIOGRAM TRACING: CPT | Performed by: ORTHOPAEDIC SURGERY

## 2023-12-27 PROCEDURE — 85025 COMPLETE CBC W/AUTO DIFF WBC: CPT

## 2023-12-27 PROCEDURE — 85610 PROTHROMBIN TIME: CPT

## 2023-12-27 PROCEDURE — 80048 BASIC METABOLIC PNL TOTAL CA: CPT

## 2023-12-27 PROCEDURE — 85730 THROMBOPLASTIN TIME PARTIAL: CPT

## 2024-01-03 LAB
QT INTERVAL: 377 MS
QTC INTERVAL: 390 MS

## 2024-01-04 ENCOUNTER — ANESTHESIA EVENT (OUTPATIENT)
Dept: PERIOP | Facility: HOSPITAL | Age: 59
End: 2024-01-04
Payer: COMMERCIAL

## 2024-01-04 RX ORDER — CEPHALEXIN 500 MG/1
500 CAPSULE ORAL 4 TIMES DAILY
Qty: 4 CAPSULE | Refills: 0 | Status: SHIPPED | OUTPATIENT
Start: 2024-01-04 | End: 2024-01-05

## 2024-01-04 RX ORDER — PROMETHAZINE HYDROCHLORIDE 12.5 MG/1
12.5 TABLET ORAL EVERY 6 HOURS PRN
Qty: 21 TABLET | Refills: 1 | Status: SHIPPED | OUTPATIENT
Start: 2024-01-04

## 2024-01-04 RX ORDER — OXYCODONE HYDROCHLORIDE AND ACETAMINOPHEN 5; 325 MG/1; MG/1
1 TABLET ORAL EVERY 6 HOURS PRN
Qty: 28 TABLET | Refills: 0 | Status: SHIPPED | OUTPATIENT
Start: 2024-01-04

## 2024-01-04 RX ORDER — NAPROXEN 500 MG/1
500 TABLET ORAL 2 TIMES DAILY WITH MEALS
Qty: 28 TABLET | Refills: 0 | Status: SHIPPED | OUTPATIENT
Start: 2024-01-04

## 2024-01-04 NOTE — ANESTHESIA PREPROCEDURE EVALUATION
Anesthesia Evaluation     Patient summary reviewed and Nursing notes reviewed   no history of anesthetic complications:   NPO Solid Status: > 8 hours  NPO Liquid Status: > 2 hours           Airway   Dental      Pulmonary    Cardiovascular     ECG reviewed    (+) valvular problems/murmurs murmur, hyperlipidemia      Neuro/Psych  (+) psychiatric history ADD  GI/Hepatic/Renal/Endo    (+) GERD    Musculoskeletal     Abdominal    Substance History      OB/GYN          Other   arthritis,     ROS/Med Hx Other: Additional History:  Allergies    PSH:  COLONOSCOPY TONSILLECTOMY  SHOULDER ARTHROSCOPY W/ ROTATOR CUFF REPAIR SHOULDER SURGERY              Anesthesia Plan    ASA 2     general with block     (Patient identified; pre-operative vital signs, all relevant labs/studies, complete medical/surgical/anesthetic history, full medication list, full allergy list, and NPO status obtained/reviewed; physical assessment performed; anesthetic options, side effects, potential complications, risks, and benefits discussed; questions answered; written anesthesia consent obtained; patient cleared for procedure; anesthesia machine and equipment checked and functioning)  intravenous induction     Anesthetic plan, risks, benefits, and alternatives have been provided, discussed and informed consent has been obtained with: patient.    Plan discussed with CRNA and CAA.    CODE STATUS:

## 2024-01-04 NOTE — H&P
[facility]   HISTORY AND PHYSICAL    Patient Name: Jovany Taveras  : 1965  MRN: [id number]  Primary Care Physician:  Debra Frazier MD  Date of admission: (Not on file)    Subjective   Subjective     Chief Complaint: Right shoulder pain    Is a 58-year-old gent with right shoulder pain presenting for shoulder arthroscopy cuff repair possible bicep tenodesis        Review of Systems   Cardiovascular:  Negative for chest pain.   Musculoskeletal:  Positive for arthralgias.        Personal History     Past Medical History:   Diagnosis Date    ADD (attention deficit disorder)     Allergic     Allergic rhinitis     Bulging lumbar disc     Chlamydia infection     Colon polyp     Genital warts     Heart murmur     Osteoarthritis     Rotator cuff tear     Vitamin D deficiency        Past Surgical History:   Procedure Laterality Date    COLONOSCOPY  09/10/2018    polyps removed; 5 years repeat    SHOULDER ARTHROSCOPY W/ ROTATOR CUFF REPAIR Left 2021    Procedure: SHOULDER ARTHROSCOPY, BANKART REPAIR AND ROTATOR CUFF REPAIR;  Surgeon: Wan Mccormick MD;  Location: [facility];  Service: Orthopedics;  Laterality: Left;    SHOULDER SURGERY Left 2021    scope/ bankart repair    TONSILLECTOMY         Family History: family history includes Hypertension in his father; Osteoarthritis in his father; Osteoporosis in his father. Otherwise pertinent FHx was reviewed and not pertinent to current issue.    Social History:  reports that he has never smoked. He has never used smokeless tobacco. He reports current alcohol use of about 6.0 standard drinks of alcohol per week. He reports that he does not use drugs.    Home Medications:  amphetamine-dextroamphetamine, betamethasone (augmented), cyclobenzaprine, meloxicam, methylPREDNISolone, pantoprazole, and traMADol    Allergies:  Allergies   Allergen Reactions    Decongestant  [Pseudoephedrine Hcl Er] Unknown (See Comments)    Penicillin G  Unknown (See Comments)    Penicillins Hives and Other (See Comments)    Pseudoephedrine Hives and Other (See Comments)       Objective    Objective     Right shoulder demonstrates intact skin no areas of tenderness passive elevation 170 abduction 150 external rotation 50 internal rotation L5 with pain and weakness on Speed, [place], and supraspinatus testing.  Belly press and liftoff are 5/5.  Sensory and motor exam are intact all distributions. Radial pulse is palpable and capillary refill is less than two seconds to all digits.     Imaging:  Prior x-ray and MRI of the right shoulder reviewed demonstrate well-maintained joint spaces of the high-grade 80 to 90% tear of the anterior supraspinatus at about a 50% infraspinatus tear partial tearing of the upper subscapularis and possible interstitial bicep tendon tearing     Assessment:  Right shoulder near full-thickness cuff tear and bicep tendinopathy     Plan:  Conservative or surgical options discussed he would like to proceed with right shoulder arthroscopy with rotator cuff repair possible bicep tenodesis  Risks and benefits, specifically risks of bleeding, scar, infection, fracture, stiffness, retear, nerve, tendon or artery damage, the need for further surgery, DVT, and loss of life or limb and rehab were discussed. All questions were answered and addressed.    Wan Mccormick MD

## 2024-01-05 ENCOUNTER — HOSPITAL ENCOUNTER (OUTPATIENT)
Facility: HOSPITAL | Age: 59
Setting detail: HOSPITAL OUTPATIENT SURGERY
Discharge: HOME | End: 2024-01-05
Attending: ORTHOPAEDIC SURGERY | Admitting: ORTHOPAEDIC SURGERY
Payer: COMMERCIAL

## 2024-01-05 ENCOUNTER — ANESTHESIA (OUTPATIENT)
Dept: PERIOP | Facility: HOSPITAL | Age: 59
End: 2024-01-05
Payer: COMMERCIAL

## 2024-01-05 VITALS
DIASTOLIC BLOOD PRESSURE: 81 MMHG | WEIGHT: 205 LBS | HEART RATE: 56 BPM | RESPIRATION RATE: 16 BRPM | SYSTOLIC BLOOD PRESSURE: 127 MMHG | TEMPERATURE: 97.5 F | BODY MASS INDEX: 29.41 KG/M2 | OXYGEN SATURATION: 94 %

## 2024-01-05 DIAGNOSIS — M75.111 NONTRAUMATIC INCOMPLETE TEAR OF RIGHT ROTATOR CUFF: Primary | ICD-10-CM

## 2024-01-05 PROCEDURE — 25010000002 DEXAMETHASONE PER 1 MG: Performed by: NURSE ANESTHETIST, CERTIFIED REGISTERED

## 2024-01-05 PROCEDURE — C1713 ANCHOR/SCREW BN/BN,TIS/BN: HCPCS | Performed by: ORTHOPAEDIC SURGERY

## 2024-01-05 PROCEDURE — 25010000002 CEFAZOLIN PER 500 MG: Performed by: ORTHOPAEDIC SURGERY

## 2024-01-05 PROCEDURE — C9290 INJ, BUPIVACAINE LIPOSOME: HCPCS | Performed by: ANESTHESIOLOGY

## 2024-01-05 PROCEDURE — 25010000002 ONDANSETRON PER 1 MG: Performed by: ORTHOPAEDIC SURGERY

## 2024-01-05 PROCEDURE — 29827 SHO ARTHRS SRG RT8TR CUF RPR: CPT | Performed by: ORTHOPAEDIC SURGERY

## 2024-01-05 PROCEDURE — 25010000002 MAGNESIUM SULFATE PER 500 MG OF MAGNESIUM: Performed by: NURSE ANESTHETIST, CERTIFIED REGISTERED

## 2024-01-05 PROCEDURE — 0 BUPIVACAINE LIPOSOME 1.3 % SUSPENSION: Performed by: ANESTHESIOLOGY

## 2024-01-05 PROCEDURE — 25010000002 PROPOFOL 1000 MG/100ML EMULSION: Performed by: NURSE ANESTHETIST, CERTIFIED REGISTERED

## 2024-01-05 PROCEDURE — 25010000002 SUCCINYLCHOLINE PER 20 MG: Performed by: NURSE ANESTHETIST, CERTIFIED REGISTERED

## 2024-01-05 PROCEDURE — 25010000002 BUPIVACAINE (PF) 0.5 % SOLUTION: Performed by: ANESTHESIOLOGY

## 2024-01-05 PROCEDURE — 25010000002 SUGAMMADEX 200 MG/2ML SOLUTION: Performed by: NURSE ANESTHETIST, CERTIFIED REGISTERED

## 2024-01-05 PROCEDURE — 25810000003 LACTATED RINGERS PER 1000 ML: Performed by: ANESTHESIOLOGY

## 2024-01-05 PROCEDURE — 29827 SHO ARTHRS SRG RT8TR CUF RPR: CPT | Performed by: PHYSICIAN ASSISTANT

## 2024-01-05 PROCEDURE — 25010000002 HYDROMORPHONE 1 MG/ML SOLUTION: Performed by: NURSE ANESTHETIST, CERTIFIED REGISTERED

## 2024-01-05 PROCEDURE — 25010000002 MIDAZOLAM PER 1 MG: Performed by: ANESTHESIOLOGY

## 2024-01-05 PROCEDURE — 25010000002 BUPIVACAINE (PF) 0.25 % SOLUTION 10 ML VIAL: Performed by: ORTHOPAEDIC SURGERY

## 2024-01-05 DEVICE — CROSSFT KNOTLESS BIOCOMPOSITE 4.75 MM SUTURE ANCHOR WITH ONE 2 MM HI-FI TAPE (WHITE/BLACK)
Type: IMPLANTABLE DEVICE | Site: SHOULDER | Status: FUNCTIONAL
Brand: CROSSFT, HI-FI

## 2024-01-05 DEVICE — CROSSFT KNOTLESS BIOCOMPOSITE 4.75 MM SUTURE ANCHOR WITH ONE 2 MM HI-FI TAPE (BLUE)
Type: IMPLANTABLE DEVICE | Site: SHOULDER | Status: FUNCTIONAL
Brand: CROSSFT, HI-FI

## 2024-01-05 DEVICE — 4.75 MM ARGO KNOTLESS ANCHOR
Type: IMPLANTABLE DEVICE | Site: SHOULDER | Status: FUNCTIONAL
Brand: ARGO KNOTLESS

## 2024-01-05 DEVICE — HI-FI® PASSING LOOP
Type: IMPLANTABLE DEVICE | Site: SHOULDER | Status: FUNCTIONAL
Brand: HI-FI

## 2024-01-05 RX ORDER — ONDANSETRON 2 MG/ML
4 INJECTION INTRAMUSCULAR; INTRAVENOUS ONCE AS NEEDED
Status: DISCONTINUED | OUTPATIENT
Start: 2024-01-05 | End: 2024-01-05 | Stop reason: HOSPADM

## 2024-01-05 RX ORDER — FLUMAZENIL 0.1 MG/ML
0.2 INJECTION INTRAVENOUS AS NEEDED
Status: DISCONTINUED | OUTPATIENT
Start: 2024-01-05 | End: 2024-01-05 | Stop reason: HOSPADM

## 2024-01-05 RX ORDER — LABETALOL HYDROCHLORIDE 5 MG/ML
5 INJECTION, SOLUTION INTRAVENOUS
Status: DISCONTINUED | OUTPATIENT
Start: 2024-01-05 | End: 2024-01-05 | Stop reason: HOSPADM

## 2024-01-05 RX ORDER — IPRATROPIUM BROMIDE AND ALBUTEROL SULFATE 2.5; .5 MG/3ML; MG/3ML
3 SOLUTION RESPIRATORY (INHALATION) ONCE AS NEEDED
Status: DISCONTINUED | OUTPATIENT
Start: 2024-01-05 | End: 2024-01-05 | Stop reason: HOSPADM

## 2024-01-05 RX ORDER — SUCCINYLCHOLINE CHLORIDE 20 MG/ML
INJECTION INTRAMUSCULAR; INTRAVENOUS AS NEEDED
Status: DISCONTINUED | OUTPATIENT
Start: 2024-01-05 | End: 2024-01-05 | Stop reason: SURG

## 2024-01-05 RX ORDER — NALOXONE HCL 0.4 MG/ML
0.4 VIAL (ML) INJECTION AS NEEDED
Status: DISCONTINUED | OUTPATIENT
Start: 2024-01-05 | End: 2024-01-05 | Stop reason: HOSPADM

## 2024-01-05 RX ORDER — MIDAZOLAM HYDROCHLORIDE 1 MG/ML
INJECTION INTRAMUSCULAR; INTRAVENOUS AS NEEDED
Status: DISCONTINUED | OUTPATIENT
Start: 2024-01-05 | End: 2024-01-05 | Stop reason: SURG

## 2024-01-05 RX ORDER — SODIUM CHLORIDE 0.9 % (FLUSH) 0.9 %
10 SYRINGE (ML) INJECTION EVERY 12 HOURS SCHEDULED
Status: DISCONTINUED | OUTPATIENT
Start: 2024-01-05 | End: 2024-01-05 | Stop reason: HOSPADM

## 2024-01-05 RX ORDER — PHENYLEPHRINE HCL IN 0.9% NACL 1 MG/10 ML
SYRINGE (ML) INTRAVENOUS AS NEEDED
Status: DISCONTINUED | OUTPATIENT
Start: 2024-01-05 | End: 2024-01-05 | Stop reason: SURG

## 2024-01-05 RX ORDER — SODIUM CHLORIDE 0.9 % (FLUSH) 0.9 %
10 SYRINGE (ML) INJECTION AS NEEDED
Status: DISCONTINUED | OUTPATIENT
Start: 2024-01-05 | End: 2024-01-05 | Stop reason: HOSPADM

## 2024-01-05 RX ORDER — OXYCODONE HYDROCHLORIDE 5 MG/1
5 TABLET ORAL ONCE AS NEEDED
Status: DISCONTINUED | OUTPATIENT
Start: 2024-01-05 | End: 2024-01-05 | Stop reason: HOSPADM

## 2024-01-05 RX ORDER — HYDRALAZINE HYDROCHLORIDE 20 MG/ML
5 INJECTION INTRAMUSCULAR; INTRAVENOUS
Status: DISCONTINUED | OUTPATIENT
Start: 2024-01-05 | End: 2024-01-05 | Stop reason: HOSPADM

## 2024-01-05 RX ORDER — DEXAMETHASONE SODIUM PHOSPHATE 4 MG/ML
INJECTION, SOLUTION INTRA-ARTICULAR; INTRALESIONAL; INTRAMUSCULAR; INTRAVENOUS; SOFT TISSUE AS NEEDED
Status: DISCONTINUED | OUTPATIENT
Start: 2024-01-05 | End: 2024-01-05 | Stop reason: SURG

## 2024-01-05 RX ORDER — ACETAMINOPHEN 500 MG
1000 TABLET ORAL ONCE
Status: COMPLETED | OUTPATIENT
Start: 2024-01-05 | End: 2024-01-05

## 2024-01-05 RX ORDER — PROPOFOL 10 MG/ML
INJECTION, EMULSION INTRAVENOUS AS NEEDED
Status: DISCONTINUED | OUTPATIENT
Start: 2024-01-05 | End: 2024-01-05 | Stop reason: SURG

## 2024-01-05 RX ORDER — DIPHENHYDRAMINE HYDROCHLORIDE 50 MG/ML
12.5 INJECTION INTRAMUSCULAR; INTRAVENOUS ONCE AS NEEDED
Status: DISCONTINUED | OUTPATIENT
Start: 2024-01-05 | End: 2024-01-05 | Stop reason: HOSPADM

## 2024-01-05 RX ORDER — MAGNESIUM SULFATE HEPTAHYDRATE 500 MG/ML
INJECTION, SOLUTION INTRAMUSCULAR; INTRAVENOUS AS NEEDED
Status: DISCONTINUED | OUTPATIENT
Start: 2024-01-05 | End: 2024-01-05 | Stop reason: SURG

## 2024-01-05 RX ORDER — ONDANSETRON 2 MG/ML
4 INJECTION INTRAMUSCULAR; INTRAVENOUS ONCE AS NEEDED
Status: COMPLETED | OUTPATIENT
Start: 2024-01-05 | End: 2024-01-05

## 2024-01-05 RX ORDER — BUPIVACAINE HYDROCHLORIDE 5 MG/ML
INJECTION, SOLUTION EPIDURAL; INTRACAUDAL
Status: COMPLETED | OUTPATIENT
Start: 2024-01-05 | End: 2024-01-05

## 2024-01-05 RX ORDER — MEPERIDINE HYDROCHLORIDE 25 MG/ML
12.5 INJECTION INTRAMUSCULAR; INTRAVENOUS; SUBCUTANEOUS
Status: DISCONTINUED | OUTPATIENT
Start: 2024-01-05 | End: 2024-01-05 | Stop reason: HOSPADM

## 2024-01-05 RX ORDER — SODIUM CHLORIDE, SODIUM LACTATE, POTASSIUM CHLORIDE, CALCIUM CHLORIDE 600; 310; 30; 20 MG/100ML; MG/100ML; MG/100ML; MG/100ML
9 INJECTION, SOLUTION INTRAVENOUS CONTINUOUS PRN
Status: DISCONTINUED | OUTPATIENT
Start: 2024-01-05 | End: 2024-01-05 | Stop reason: HOSPADM

## 2024-01-05 RX ORDER — ROCURONIUM BROMIDE 10 MG/ML
INJECTION, SOLUTION INTRAVENOUS AS NEEDED
Status: DISCONTINUED | OUTPATIENT
Start: 2024-01-05 | End: 2024-01-05 | Stop reason: SURG

## 2024-01-05 RX ORDER — DIPHENHYDRAMINE HYDROCHLORIDE 50 MG/ML
12.5 INJECTION INTRAMUSCULAR; INTRAVENOUS
Status: DISCONTINUED | OUTPATIENT
Start: 2024-01-05 | End: 2024-01-05 | Stop reason: HOSPADM

## 2024-01-05 RX ORDER — OXYCODONE HYDROCHLORIDE 5 MG/1
10 TABLET ORAL EVERY 4 HOURS PRN
Status: DISCONTINUED | OUTPATIENT
Start: 2024-01-05 | End: 2024-01-05 | Stop reason: HOSPADM

## 2024-01-05 RX ADMIN — BUPIVACAINE 10 ML: 13.3 INJECTION, SUSPENSION, LIPOSOMAL INFILTRATION at 07:20

## 2024-01-05 RX ADMIN — ROCURONIUM BROMIDE 10 MG: 10 INJECTION, SOLUTION INTRAVENOUS at 07:34

## 2024-01-05 RX ADMIN — LIDOCAINE HYDROCHLORIDE 100 MG: 20 INJECTION, SOLUTION INTRAVENOUS at 07:34

## 2024-01-05 RX ADMIN — CEFAZOLIN 2000 MG: 2 INJECTION, POWDER, FOR SOLUTION INTRAMUSCULAR; INTRAVENOUS at 07:25

## 2024-01-05 RX ADMIN — ACETAMINOPHEN 1000 MG: 500 TABLET ORAL at 06:28

## 2024-01-05 RX ADMIN — ONDANSETRON 4 MG: 2 INJECTION INTRAMUSCULAR; INTRAVENOUS at 08:22

## 2024-01-05 RX ADMIN — BUPIVACAINE HYDROCHLORIDE 10 ML: 5 INJECTION, SOLUTION EPIDURAL; INTRACAUDAL; PERINEURAL at 07:20

## 2024-01-05 RX ADMIN — OXYCODONE HYDROCHLORIDE 10 MG: 5 TABLET ORAL at 09:47

## 2024-01-05 RX ADMIN — SUGAMMADEX 200 MG: 100 INJECTION, SOLUTION INTRAVENOUS at 08:39

## 2024-01-05 RX ADMIN — MIDAZOLAM 2 MG: 1 INJECTION INTRAMUSCULAR; INTRAVENOUS at 07:15

## 2024-01-05 RX ADMIN — PROPOFOL INJECTABLE EMULSION 200 MG: 10 INJECTION, EMULSION INTRAVENOUS at 07:34

## 2024-01-05 RX ADMIN — Medication 100 MCG: at 08:01

## 2024-01-05 RX ADMIN — HYDROMORPHONE HYDROCHLORIDE 0.5 MG: 1 INJECTION, SOLUTION INTRAMUSCULAR; INTRAVENOUS; SUBCUTANEOUS at 09:18

## 2024-01-05 RX ADMIN — MAGNESIUM SULFATE HEPTAHYDRATE 1 G: 500 INJECTION, SOLUTION INTRAMUSCULAR; INTRAVENOUS at 07:55

## 2024-01-05 RX ADMIN — SUCCINYLCHOLINE CHLORIDE 120 MG: 20 INJECTION, SOLUTION INTRAMUSCULAR; INTRAVENOUS at 07:34

## 2024-01-05 RX ADMIN — DEXAMETHASONE SODIUM PHOSPHATE 4 MG: 4 INJECTION, SOLUTION INTRAMUSCULAR; INTRAVENOUS at 08:22

## 2024-01-05 RX ADMIN — SODIUM CHLORIDE, POTASSIUM CHLORIDE, SODIUM LACTATE AND CALCIUM CHLORIDE 9 ML/HR: 600; 310; 30; 20 INJECTION, SOLUTION INTRAVENOUS at 06:28

## 2024-01-05 RX ADMIN — ROCURONIUM BROMIDE 40 MG: 10 INJECTION, SOLUTION INTRAVENOUS at 07:46

## 2024-01-05 RX ADMIN — SODIUM CHLORIDE, POTASSIUM CHLORIDE, SODIUM LACTATE AND CALCIUM CHLORIDE: 600; 310; 30; 20 INJECTION, SOLUTION INTRAVENOUS at 08:31

## 2024-01-05 RX ADMIN — Medication 100 MCG: at 08:05

## 2024-01-05 NOTE — ANESTHESIA PROCEDURE NOTES
Airway  Urgency: elective    Date/Time: 1/5/2024 7:36 AM  Airway not difficult    General Information and Staff    Patient location during procedure: OR    Indications and Patient Condition  Indications for airway management: airway protection    Preoxygenated: yes  MILS not maintained throughout  Mask difficulty assessment: 1 - vent by mask    Final Airway Details  Final airway type: endotracheal airway      Successful airway: ETT  Cuffed: yes   Successful intubation technique: video laryngoscopy  Facilitating devices/methods: intubating stylet  Endotracheal tube insertion site: oral  Blade: Pacheco  Blade size: 3  ETT size (mm): 7.5  Cormack-Lehane Classification: grade I - full view of glottis  Placement verified by: chest auscultation and capnometry   Measured from: lips  ETT/EBT  to lips (cm): 22  Number of attempts at approach: 1  Assessment: lips, teeth, and gum same as pre-op and atraumatic intubation

## 2024-01-05 NOTE — OP NOTE
SHOULDER ARTHROSCOPY WITH ROTATOR CUFF REPAIR  Procedure Report    Patient Name:  Jovany Taveras  YOB: 1965    Date of Surgery:  1/5/2024     Indications: This is a 58 y.o. male with pain to the right shoulder.  Imaging demonstrated rotator cuff tear.Treatment options were discussed.  They desired to proceed with shoulder arthroscopy with rotator cuff repair after discussing the risks including bleeding, scarring, infection, stiffness, nerve damage, tendon damage, artery damage, continued pain, DVT, loss of life or limb, and a need for further surgery.      Pre-op Diagnosis:   Nontraumatic incomplete tear of right rotator cuff [M75.111]       Post-op Diagnosis:    Right shoulder 90% supraspinatus tear    Procedure/CPT® Codes: 44029    Procedure(s): Right  SHOULDER ARTHROSCOPY WITH ROTATOR CUFF REPAIR    Assistant: David Golden physician assistant    was responsible for performing the following activities: Retraction, Suction, Irrigation, Suturing, Closing, and Placing Dressing and their skilled assistance was necessary for the success of this case.         Anesthesia: General with Block    IV fluids: See anesthesia record    Estimated Blood Loss: minimal    Implants:    Implant Name Type Inv. Item Serial No.  Lot No. LRB No. Used Action   SUT LP NONABS CUFFLINK HIFI NMBR2 - [id number] Implant SUT LP NONABS CUFFLINK HIFI NMBR2  [employer] [id number] Right 3 Implanted   SUT/ANCH KNOTLSS CROSSFT BIOCOMP ROW MEDL W/TPE JULIAN 4.75MM - [id number] Implant SUT/ANCH KNOTLSS CROSSFT BIOCOMP ROW MEDL W/TPE JULIAN 4.75MM  [employer] [id number] Right 1 Implanted   SUT/ANCH KNOTLSS CROSSFT BIOCOMP ROW MEDL W/COBR/TPE 4.75MM - [id number] Implant SUT/ANCH KNOTLSS CROSSFT BIOCOMP ROW MEDL W/COBR/TPE 4.75MM  [employer] [id number] Right 1 Implanted   SUT/ANCH CLEVE KNOTLSS 4.75MM - [id number] Implant SUT/ANCH CLEVE KNOTLSS 4.75MM  [employer] [id number] Right 1 Implanted   SUT/ANCH CLEVE KNOTLSS 4.75MM - [id number]  Implant SUT/ANCH CLEVE KNOTLSS 4.75MM  [employer] CLAIRE [id number] Right 1 Implanted         Complications: None    Specimens:none    Description of Procedure: The patient's operative site was marked.  Regional anesthesia was administered.  They were brought to the operating room and placed  on the operating room table.  General anesthesia was administered. Antibiotics were dosed.  A timeout was taken, confirming the correct operative site and procedure.  Exam under anesthesia indicated full range of motion and no instability.  They were placed in a semilateral position.  An axillary roll and SCDs were placed.  The right shoulder was prepped and draped in the standard surgical fashion.  The shoulder was injected with local anesthetic and was placed into traction.  A posterior portal was created.  A camera was inserted.  The glenoid chondral surface was intact.  The humerus surface was intact.  The subscapularis was intact.  The biceps was intact.  The labrum was intact.  The undersurface of the cuff demonstrated high-grade near full-thickness supraspinatus tear. A shaver was inserted.  The labrum was probed and intact, the undersurface of the supraspinatus was debrided measured about a 90% supraspinatus tear for a length of 2 cm and was tagged with a PDS suture.  The biceps was pulled into the shoulder and found to be intact.  The axillary pouch was free of synovitis or loose bodies. The instruments were placed in the subacromial space.  A full-thickness bursectomy was performed.  The CA ligament was intact.  No impingement was noted.  An accessory portal was created. Tear was visualized, completed and prepared.  It was a 2 cm crescent shape tear.  The tuberosity was skeletonized to bleeding bone.  A microfracture was performed.  Two medial row anchors were placed as well as 2 FiberLink sutures which were passed through the tendon, crisscrossed, and secured to lateral row of fixation, restoring the tendon to its footprint.   The instruments were removed.  The wounds were closed with suture and Steri-Strips.     a sterile dressing was applied to the rest of the shoulder.  They were placed in a sling and taken to the recovery room.  There were no complications.  I was present for all portions.  All counts were correct.  Good capillary refill was noted to the hand.      Wan Mccormick MD     Date: 1/5/2024  Time: 09:06 EST

## 2024-01-05 NOTE — ANESTHESIA POSTPROCEDURE EVALUATION
Patient: Jovany Taveras    Procedure Summary       Date: 01/05/24 Room / Location: [facility] OR 11 / [facility] MAIN OR    Anesthesia Start: 0725 Anesthesia Stop: 0849    Procedure: SHOULDER ARTHROSCOPY WITH ROTATOR CUFF REPAIR (Right: Shoulder) Diagnosis:       Nontraumatic incomplete tear of right rotator cuff      (Nontraumatic incomplete tear of right rotator cuff [M75.111])    Surgeons: Wan Mccormick MD Provider: Jonathan Cruz MD    Anesthesia Type: general with block ASA Status: 2            Anesthesia Type: general with block    Vitals  Vitals Value Taken Time   /82 01/05/24 0949   Temp 97.4 °F (36.3 °C) 01/05/24 0946   Pulse 59 01/05/24 0951   Resp 13 01/05/24 0946   SpO2 93 % 01/05/24 0951   Vitals shown include unfiled device data.        Post Anesthesia Care and Evaluation    Patient location during evaluation: PACU  Patient participation: complete - patient participated  Level of consciousness: awake  Pain scale: See nurse's notes for pain score.  Pain management: adequate    Airway patency: patent  Anesthetic complications: No anesthetic complications  PONV Status: none  Cardiovascular status: acceptable  Respiratory status: acceptable and spontaneous ventilation  Hydration status: acceptable    Comments: Patient seen and examined postoperatively; vital signs stable; SpO2 greater than or equal to 90%; cardiopulmonary status stable; nausea/vomiting adequately controlled; pain adequately controlled; no apparent anesthesia complications; patient discharged from anesthesia care when discharge criteria were met

## 2024-01-05 NOTE — ANESTHESIA PROCEDURE NOTES
Peripheral Block    Pre-sedation assessment completed: 1/5/2024 7:00 AM    Patient reassessed immediately prior to procedure    Patient location during procedure: pre-op  Reason for block: procedure for pain, at surgeon's request, post-op pain management and secondary anesthetic  Performed by  Anesthesiologist: Jonathan Cruz MD  Preanesthetic Checklist  Completed: patient identified, IV checked, site marked, risks and benefits discussed, surgical consent, monitors and equipment checked, pre-op evaluation and timeout performed  Prep:  Pt Position: sitting  Sterile barriers:cap, gloves, mask and washed/disinfected hands  Prep: ChloraPrep  Patient monitoring: blood pressure monitoring, continuous pulse oximetry and EKG  Procedure    Sedation: yes  Performed under: local infiltration  Guidance:ultrasound guided and landmark technique    ULTRASOUND INTERPRETATION.  Using ultrasound guidance a 22 G gauge needle was placed in close proximity to the brachial plexus nerve, at which point, under ultrasound guidance anesthetic was injected in the area of the nerve and spread of the anesthesia was seen on ultrasound in close proximity thereto.  There were no abnormalities seen on ultrasound; a digital image was taken; and the patient tolerated the procedure with no complications. Images:still images obtained, printed/placed on chart    Laterality:right  Block Type:interscalene  Injection Technique:single-shot  Needle Type:echogenic  Needle Gauge:22 G  Resistance on Injection: less than 15 psi    Medications Used: bupivacaine liposome (EXPAREL) injection 1.3% - Perineural   10 mL - 1/5/2024 7:20:00 AM  bupivacaine PF (MARCAINE) injection 0.5% - Perineural   10 mL - 1/5/2024 7:20:00 AM      Post Assessment  Injection Assessment: negative aspiration for heme, no paresthesia on injection and incremental injection  Patient Tolerance:comfortable throughout block  Complications:no  Additional Notes  Pre-procedure:  Peripheral  nerve block performed preoperatively prior to the start of anesthesia time at the request of the patient and the surgeon for the management of postoperative acute surgical pain as well as for a secondary intraoperative anesthetic (general anesthesia is the primary intraoperative anesthetic); patient identified; pre-procedure vital signs, all relevant labs/studies, complete medical/surgical/anesthetic history, full medication list, full allergy list, and NPO status obtained/reviewed; physical assessment performed; anesthetic options, side effects, potential complications, risks, and benefits discussed; questions answered; patient wishes to proceed with the procedure; written anesthesia procedure consent obtained; patient cleared for procedure; time out performed; IV access in situ    Procedure:  ASA monitor placed; supplemental oxygen provided; patient positioned; hand hygiene performed; sterile technique maintained throughout the procedure; sterile prep applied; insertion site determined by anatomical landmarks, palpation, and ultrasound imaging; live ultrasound guidance throughout the procedure; target nerves/landmarks identified on live ultrasound; skin and subcutaneous tissues numbed by injection of 1% lidocaine; 2 inch 22G AkesoGenX Ultra 360 Insulated Echogenic Needle used; realtime needle advancement and placement near the target nerves witnessed on live ultrasound; negative aspiration prior to injection; correct needle placement confirmed on live ultrasound by local anesthetic spread around the target nerves; local anesthetic mixture injected with negative aspiration prior to each injection and after each 1-5 mL injected; needle withdrawn; dressing applied; ultrasound image printed and placed in the patient's permanent medical record    Post-procedure:  Peripheral nerve block placed successfully; good block; no apparent complications; minimal estimated blood loss; vital signs stable throughout; see nurse's  notes for vitals; transported to the OR, general anesthesia induced, and surgery started

## 2024-01-08 ENCOUNTER — OFFICE VISIT (OUTPATIENT)
Dept: ORTHOPEDIC SURGERY | Facility: CLINIC | Age: 59
End: 2024-01-08
Payer: COMMERCIAL

## 2024-01-08 VITALS — HEART RATE: 63 BPM | WEIGHT: 205 LBS | BODY MASS INDEX: 29.35 KG/M2 | HEIGHT: 70 IN

## 2024-01-08 DIAGNOSIS — Z47.89 ORTHOPEDIC AFTERCARE: Primary | ICD-10-CM

## 2024-01-08 PROCEDURE — 99024 POSTOP FOLLOW-UP VISIT: CPT | Performed by: ORTHOPAEDIC SURGERY

## 2024-01-08 NOTE — PATIENT INSTRUCTIONS
Shoulder Arthroscopy: Post- Operative Visit Objectives    Review the operative findings, procedures and photos.  Make sure medications are effective and not causing problems.  Pain: Oxycodone or hydrocodone is for pain. You may take 1 tablet every 6 hours as necessary.  Some patients don’t require the use of these…in those circumstances just use Tylenol extra-strength 1 or 2 tablets every 4-6 hours.   Naproxen 500 mg. For pain and inflammation.  You should take 1 every twice a day.  Do not use Aleve, Ibuprofen, Motrin or Advil during this time.  If you have had any problems stop taking these medicines and please tell us!  Keflex (cephalexin). This is an antibiotic to be taken as a preventive medicine.  Take 1 pill every 6 hours for 1 day. If you have a penicillin allergy this will be replaced by other options.  Wound Care:  Today we will change your dressings and cover your wounds with a plastic covering called Tegaderm. This will allow you to shower immediately.  Remove the tegaderm and underlying dressings in two weeks then ok to get wet in a shower. No Baths or swimming until 4 weeks after surgery.  Keep a towel on the dressing while applying ice.  Exercises and Physical Therapy Remember the protocol is 3 phases:  Healing (6 wks)  Continue the use of the sling for 6 weeks; depending on procedure utilized this may be shorter. You can do gentle range of motion exercise of the elbow and wrist immediately with the arm at the side.  Formal physical therapy will usually start in two weeks.    Rehabilitative (6 wks) You begin a more aggressive phase of physical therapy at the 6 week ricardo. Light strengthening and a continued emphasis on protecting the repair are important at this stage.  Restorative (4 wks)  Back to your sports and job activities gradually   Follow Up appointments Schedule Follow up visits as directed usually in 4 weeks. Physical therapy will be ordered today and you should receive a phone call or can  schedule yourself if appropriate.  Notes  Make sure you have all necessary notes and documentation for school or work.  Issues: Remember our goal is to make this process smooth and easy if there is any thing you need please ask us or call back [phone]

## 2024-01-08 NOTE — PROGRESS NOTES
Patient ID: Jovany Taveras is a 58 y.o. male.  1/5/24 right shoulder arthroscopy supraspinatus repair  Pain controlled    Objective:    There were no vitals taken for this visit.    Physical Examination:  Wounds are well approximated without infection.  Sensory and motor exam are intact in all distributions. Radial pulse is palpable and capillary refill is less than two seconds to all digits.       Imaging:      Assessment:  Doing well after cuff repair    Plan:  Wounds were cleaned and redressed. Restrictions discussed.  Begin therapy and see me in 4 weeks.  Remove dressings in two weeks.

## 2024-01-16 ENCOUNTER — TREATMENT (OUTPATIENT)
Dept: PHYSICAL THERAPY | Facility: CLINIC | Age: 59
End: 2024-01-16
Payer: COMMERCIAL

## 2024-01-16 DIAGNOSIS — Z98.890 S/P ARTHROSCOPY OF RIGHT SHOULDER: ICD-10-CM

## 2024-01-16 DIAGNOSIS — M25.511 ACUTE PAIN OF RIGHT SHOULDER: Primary | ICD-10-CM

## 2024-01-16 DIAGNOSIS — R29.898 WEAKNESS OF SHOULDER: ICD-10-CM

## 2024-01-16 DIAGNOSIS — Z47.89 ORTHOPEDIC AFTERCARE: ICD-10-CM

## 2024-01-16 DIAGNOSIS — M25.611 DECREASED RIGHT SHOULDER RANGE OF MOTION: ICD-10-CM

## 2024-01-16 PROCEDURE — 97110 THERAPEUTIC EXERCISES: CPT | Performed by: PHYSICAL THERAPIST

## 2024-01-16 PROCEDURE — 97140 MANUAL THERAPY 1/> REGIONS: CPT | Performed by: PHYSICAL THERAPIST

## 2024-01-16 PROCEDURE — 97161 PT EVAL LOW COMPLEX 20 MIN: CPT | Performed by: PHYSICAL THERAPIST

## 2024-01-16 NOTE — PROGRESS NOTES
Physical Therapy Initial Evaluation and Plan of Care  [facility] [place]  [address]  [place] [address]    Patient: Jovany Taveras   : 1965  Diagnosis/ICD-10 Code:  Acute pain of right shoulder [M25.511]  Referring practitioner: Wan Mccormick, *  Date of Initial Visit: 2024  Today's Date: 2024  Patient seen for 1 sessions        ICD-10-CM ICD-9-CM   1. Acute pain of right shoulder  M25.511 719.41   2. Orthopedic aftercare  Z47.89 V54.9   3. S/P arthroscopy of right shoulder  Z98.890 V45.89   4. Weakness of shoulder  R29.898 719.61   5. Decreased right shoulder range of motion  M25.611 719.51     Past Medical History:   Diagnosis Date    ADD (attention deficit disorder)     Allergic     Allergic rhinitis     Bulging lumbar disc     Chlamydia infection     Colon polyp     Genital warts     Heart murmur     Osteoarthritis     Rotator cuff tear     Vitamin D deficiency             Subjective Questionnaire: QuickDASH: 80%  Subjective Questionnaire: QuickDASH (work) 100%  Subjective Questionnaire: QuickDASH (Sports - Volleyball & Tennis) 100%    Subjective Evaluation    History of Present Illness  Date of surgery: 2024  Mechanism of injury: 58 year old male who reports to clinic today with current complaints of soreness and tightness R shoulder s/p right shoulder arthroscopic supraspinatus repair on 2024.  He reports that he has been doing well since surgery and is not needing pain medications at this point.         Patient Occupation: customer service Pain  Current pain ratin  At best pain ratin  At worst pain ratin  Location: right anterior shoulder  Quality: tightness.  Relieving factors: rest  Aggravating factors: movement  Progression: improved    Social Support  Lives with: spouse    Hand dominance: right    Treatments  Current treatment: physical therapy  Patient Goals  Patient goals for therapy: decreased pain, increased motion, increased  strength, independence with ADLs/IADLs, return to sport/leisure activities and return to work  Patient goal: return to tennis volleyball         Objective          Neurological Testing     Sensation     Shoulder     Right Shoulder   Intact: Light touch    Comments   Right light touch: right upper extremity    Passive Range of Motion     Right Shoulder   Flexion: 115 degrees with pain  Abduction: 90 degrees   External rotation 45°: 47 degrees   Internal rotation 0°: 57 degrees       See Exercise, Manual, and Modality Logs for complete treatment    Issued, instructed in & performed home exercise program below:   Access Code: [email or web address]  URL: [email or web address]  Date: 01/16/2024  Prepared by: Jonathan Redding    Exercises  - Flexion-Extension Shoulder Pendulum with Table Support  - 3 x daily - 7 x weekly - 1 sets - 10 reps  - Horizontal Shoulder Pendulum with Table Support  - 3 x daily - 7 x weekly - 1 sets - 10 reps  - Seated Elbow Flexion and Extension AROM  - 3 x daily - 7 x weekly - 1 sets - 10 reps  - Supported Wrist Circumduction  - 3 x daily - 7 x weekly - 1 sets - 10 reps  - Forearm Strengthening with Ball Squeeze  - 3 x daily - 7 x weekly - 1 sets - 10 reps    Assessment & Plan       Assessment  Impairments: abnormal muscle tone, abnormal or restricted ROM, activity intolerance, impaired physical strength, lacks appropriate home exercise program and pain with function   Functional limitations: carrying objects, lifting, sleeping, pulling, pushing, uncomfortable because of pain, reaching behind back, reaching overhead and unable to perform repetitive tasks   Assessment details: 58 year old male who presents with the impairments noted above secondary to right shoulder pain, swelling, decreased range of motion, decreased joint play and decreased functional shoulder mobility. These impairments decrease his ability and tolerance to perform his normal daily activities.  This patient presents with a level of  complexity and his condition is such that the services required can be safely and effectively performed only by a qualified PT or supervised PTA.     Prognosis: good    Goals  Plan Goals: STG (6 weeks)  1) Independent in home program for basic shoulder range of motion  2) Demonstrates basic understanding of condition and their role in treatment progression  3) Tolerates initiation of shoulder strengthening  4) Demonstrates slight improvement in tolerance to daily activities as evidenced by QuickDash score of 50% or less.      LTG (12 Weeks)  1) Independent in home program for self-management of his condition  2) Demonstrates AROM  right shoulder flexion and abduction equal to 170 degrees or greater to allow for use of that upper extremity for reaching at or above head-height  3) Demonstrates AROM right shoulder internal and external rotation at 90 degrees abduction equal to 80 degrees or greater to allow for use of that upper extremity for dressing and grooming  4) Demonstrates significant improvement in his tolerance to daily activities as evidenced by QuickDash score of 20% or less.  5) MMT of right shoulder flexion/abduction/IR/ER equal to 5/5 to allow for ability to safely use that upper extremity for lifting small household and work objects.  6) Reports ability to return to work without difficulty or significant shoulder pain.      Plan  Planned modality interventions: cryotherapy, high voltage pulsed current (spasm management), high voltage pulsed current (pain management), microcurrent electrical stimulation and thermotherapy (hydrocollator packs)  Planned therapy interventions: body mechanics training, flexibility, functional ROM exercises, home exercise program, IADL retraining, joint mobilization, manual therapy, neuromuscular re-education, postural training, soft tissue mobilization, strengthening, stretching and therapeutic activities  Frequency: 2x week  Duration in weeks: 12  Treatment plan discussed  with: patient        History # of Personal Factors and/or Comorbidities: LOW (0)  Examination of Body System(s): # of elements: LOW (1-2)  Clinical Presentation: STABLE   Clinical Decision Making: LOW     Timed:         Manual Therapy:    10     mins  73004;     Therapeutic Exercise:    15     mins  61827;     Neuromuscular Gilberto:        mins  03334;    Therapeutic Activity:          mins  65647;     Gait Training:           mins  88555;     Ultrasound:          mins  34046;    Ionto                                   mins   58330  Self Care                            mins   64861      Un-Timed:  Electrical Stimulation:         mins  82049 ( );  Canalith Repos         mins 11957  Traction          mins 45673  Dry Needle                 ______ mins DRYNDL  Low Eval     30     Mins  80930  Mod Eval          Mins  29906  High Eval                            Mins  42403  Re-Eval                               mins  35363        Timed Treatment:   25   mins   Total Treatment:     55   mins    PT SIGNATURE: Maynor Redding PT   DATE TREATMENT INITIATED: 1/16/2024    Initial Certification  Certification Period: 1/16/2024 through 4/15/2024  I certify that the therapy services are furnished while this patient is under my care.  The services outlined above are required by this patient, and will be reviewed every 90 days.     PHYSICIAN: Wan Mccormick MD      DATE:     Please sign and return via fax to [phone]. Thank you, [facility].

## 2024-01-17 DIAGNOSIS — Z12.11 SCREENING FOR COLON CANCER: Primary | ICD-10-CM

## 2024-01-18 ENCOUNTER — TREATMENT (OUTPATIENT)
Dept: PHYSICAL THERAPY | Facility: CLINIC | Age: 59
End: 2024-01-18
Payer: COMMERCIAL

## 2024-01-18 DIAGNOSIS — R29.898 WEAKNESS OF SHOULDER: ICD-10-CM

## 2024-01-18 DIAGNOSIS — M25.611 DECREASED RIGHT SHOULDER RANGE OF MOTION: ICD-10-CM

## 2024-01-18 DIAGNOSIS — Z47.89 ORTHOPEDIC AFTERCARE: ICD-10-CM

## 2024-01-18 DIAGNOSIS — M25.511 ACUTE PAIN OF RIGHT SHOULDER: Primary | ICD-10-CM

## 2024-01-18 DIAGNOSIS — Z98.890 S/P ARTHROSCOPY OF RIGHT SHOULDER: ICD-10-CM

## 2024-01-18 PROCEDURE — 97110 THERAPEUTIC EXERCISES: CPT | Performed by: PHYSICAL THERAPIST

## 2024-01-18 PROCEDURE — 97140 MANUAL THERAPY 1/> REGIONS: CPT | Performed by: PHYSICAL THERAPIST

## 2024-01-21 NOTE — PROGRESS NOTES
Physical Therapy Treatment Note  [facility] [place]  [address]  [place] [address]    Patient: Jovany Taveras   : 1965  Diagnosis/ICD-10 Code:  Acute pain of right shoulder [M25.511]  Referring practitioner: Wan Mccormick, *  Date of Initial Visit: Type: THERAPY  Noted: 2024  Today's Date: 2024  Patient seen for 2 sessions             Subjective         Jovany Taveras reports: Doing fairly well.  Not too much pain, much better than other shoulder at this point.    Objective   See Exercise, Manual, and Modality Logs for complete treatment.     Continued with manual therapy and exercises for elbow and hand mobility.      Assessment/Plan  Doing well and progressing in accordance with MD protocol    Continue therapy and progress  in accordance with MD protocol.             Manual Therapy:    15     mins  24849;  Therapeutic Exercise:    15     mins  72330;     Neuromuscular Gilberto:        mins  77070;    Therapeutic Activity:          mins  80280;     Gait Training:           mins  63376;     Ultrasound:          mins  07280;    Electrical Stimulation:         mins  15840 ( );  Dry Needling          mins self-pay    Timed Treatment:   30   mins   Total Treatment:     30   mins    Maynor Redding, PT  Physical Therapist

## 2024-01-24 ENCOUNTER — TREATMENT (OUTPATIENT)
Dept: PHYSICAL THERAPY | Facility: CLINIC | Age: 59
End: 2024-01-24
Payer: COMMERCIAL

## 2024-01-24 DIAGNOSIS — R29.898 WEAKNESS OF SHOULDER: ICD-10-CM

## 2024-01-24 DIAGNOSIS — M25.511 ACUTE PAIN OF RIGHT SHOULDER: Primary | ICD-10-CM

## 2024-01-24 DIAGNOSIS — Z98.890 S/P ARTHROSCOPY OF RIGHT SHOULDER: ICD-10-CM

## 2024-01-24 DIAGNOSIS — M25.611 DECREASED RIGHT SHOULDER RANGE OF MOTION: ICD-10-CM

## 2024-01-24 DIAGNOSIS — Z47.89 ORTHOPEDIC AFTERCARE: ICD-10-CM

## 2024-01-24 PROCEDURE — 97140 MANUAL THERAPY 1/> REGIONS: CPT | Performed by: PHYSICAL THERAPIST

## 2024-01-24 PROCEDURE — 97110 THERAPEUTIC EXERCISES: CPT | Performed by: PHYSICAL THERAPIST

## 2024-01-24 NOTE — PROGRESS NOTES
Physical Therapy Treatment Note  [facility] [place]  [address]  [place] [address]    Patient: Jovany Taveras   : 1965  Referring practitioner: Wan Mccormick, *  Date of Initial Visit: Type: THERAPY  Noted: 2024  Today's Date: 2024  Patient seen for 3 sessions    Diagnosis/ICD-10 Codes:    ICD-10-CM ICD-9-CM   1. Acute pain of right shoulder  M25.511 719.41   2. Orthopedic aftercare  Z47.89 V54.9   3. S/P arthroscopy of right shoulder  Z98.890 V45.89   4. Weakness of shoulder  R29.898 719.61   5. Decreased right shoulder range of motion  M25.611 719.51         NEXT MD APPT: 2024    DATE 2024 2024 2024 2024 2024 2024 2024 2024 3/1/2024 3/8/2024   POST OP WEEK 0 1 2 3 4 5 6 7 8 9   DATE 3/15/2024 3/22/2024 3/29/2024 2024 2024 2024 2024 5/3/2024 5/10/2024 2024   POST OP WEEK 10 11 12 13 14 15 16 17 18 19   DATE 2024   POST OP WEEK 20 21 22 23 24 25 26 27 28 29     Post-op Week 2 as of 2024           Subjective       Jovany Taveras reports: Feels that shoulder mobility is improving.  Did take pain medications prior to treatment which he reports greatly improved tolerance to treatment.    Objective   See Exercise, Manual, and Modality Logs for complete treatment.     Continued with manual therapy and exercises for elbow and hand mobility.      Passive Range of Motion      Right Shoulder   Flexion: 145 degrees  Abduction: 125 degrees   External rotation @ 45 abd: 65 degrees     Initiated scapular and cervical retraction.    Assessment/Plan  Doing well and progressing in accordance with MD protocol  Notable gains in PROM in all planes.    Continue therapy and progress  in accordance with MD protocol.             Manual Therapy:    25     mins  31137;  Therapeutic Exercise:    15     mins  63781;     Neuromuscular Gilberto:         mins  75625;    Therapeutic Activity:          mins  95486;     Gait Training:           mins  42416;     Ultrasound:          mins  28513;    Electrical Stimulation:         mins  70458 ( );  Dry Needling          mins self-pay    Timed Treatment:   40   mins   Total Treatment:     40   mins    Maynor Redding PT  Physical Therapist

## 2024-01-26 ENCOUNTER — TREATMENT (OUTPATIENT)
Dept: PHYSICAL THERAPY | Facility: CLINIC | Age: 59
End: 2024-01-26
Payer: COMMERCIAL

## 2024-01-26 ENCOUNTER — TELEPHONE (OUTPATIENT)
Dept: ORTHOPEDIC SURGERY | Facility: CLINIC | Age: 59
End: 2024-01-26
Payer: COMMERCIAL

## 2024-01-26 DIAGNOSIS — Z47.89 ORTHOPEDIC AFTERCARE: ICD-10-CM

## 2024-01-26 DIAGNOSIS — M25.511 ACUTE PAIN OF RIGHT SHOULDER: Primary | ICD-10-CM

## 2024-01-26 DIAGNOSIS — Z98.890 S/P ARTHROSCOPY OF RIGHT SHOULDER: ICD-10-CM

## 2024-01-26 DIAGNOSIS — M25.611 DECREASED RIGHT SHOULDER RANGE OF MOTION: ICD-10-CM

## 2024-01-26 DIAGNOSIS — R29.898 WEAKNESS OF SHOULDER: ICD-10-CM

## 2024-01-26 PROCEDURE — 97140 MANUAL THERAPY 1/> REGIONS: CPT | Performed by: PHYSICAL THERAPIST

## 2024-01-26 PROCEDURE — 97110 THERAPEUTIC EXERCISES: CPT | Performed by: PHYSICAL THERAPIST

## 2024-01-26 NOTE — PROGRESS NOTES
Physical Therapy Treatment Note  [facility] [place]  [address]  [place] [address]    Patient: Jovany Taveras   : 1965  Referring practitioner: Wan Mccormick, *  Date of Initial Visit: Type: THERAPY  Noted: 2024  Today's Date: 2024  Patient seen for 4 sessions    Diagnosis/ICD-10 Codes:    ICD-10-CM ICD-9-CM   1. Acute pain of right shoulder  M25.511 719.41   2. Orthopedic aftercare  Z47.89 V54.9   3. S/P arthroscopy of right shoulder  Z98.890 V45.89   4. Weakness of shoulder  R29.898 719.61   5. Decreased right shoulder range of motion  M25.611 719.51         NEXT MD APPT: 2024    DATE 2024 2024 2024 2024 2024 2024 2024 2024 3/1/2024 3/8/2024   POST OP WEEK 0 1 2 3 4 5 6 7 8 9   DATE 3/15/2024 3/22/2024 3/29/2024 2024 2024 2024 2024 5/3/2024 5/10/2024 2024   POST OP WEEK 10 11 12 13 14 15 16 17 18 19   DATE 2024   POST OP WEEK 20 21 22 23 24 25 26 27 28 29     Post-op Week 3 as of 2024           Subjective       Jovany Taveras reports: Feels that shoulder mobility is improving.  Did take pain medications prior to treatment which he reports greatly improved tolerance to treatment.    Objective   See Exercise, Manual, and Modality Logs for complete treatment.     Continued with manual therapy and exercises for elbow and hand mobility.      Passive Range of Motion      Right Shoulder   Flexion: 145 degrees  Abduction: 125 degrees   External rotation @ 45 abd: 65 degrees     Continued  scapular and cervical retraction.    Assessment/Plan  Doing well and progressing in accordance with MD protocol      Continue therapy and progress  in accordance with MD protocol.             Manual Therapy:    25     mins  35002;  Therapeutic Exercise:    15     mins  69113;     Neuromuscular Gilberto:        mins  90143;    Therapeutic  Activity:          mins  41045;     Gait Training:           mins  92604;     Ultrasound:          mins  64243;    Electrical Stimulation:         mins  37393 ( );  Dry Needling          mins self-pay    Timed Treatment:   40   mins   Total Treatment:     40   mins    Maynor Redding PT  Physical Therapist

## 2024-01-29 ENCOUNTER — TREATMENT (OUTPATIENT)
Dept: PHYSICAL THERAPY | Facility: CLINIC | Age: 59
End: 2024-01-29
Payer: COMMERCIAL

## 2024-01-29 DIAGNOSIS — M25.611 DECREASED RIGHT SHOULDER RANGE OF MOTION: ICD-10-CM

## 2024-01-29 DIAGNOSIS — M25.511 ACUTE PAIN OF RIGHT SHOULDER: Primary | ICD-10-CM

## 2024-01-29 DIAGNOSIS — Z47.89 ORTHOPEDIC AFTERCARE: ICD-10-CM

## 2024-01-29 DIAGNOSIS — R29.898 WEAKNESS OF SHOULDER: ICD-10-CM

## 2024-01-29 DIAGNOSIS — Z98.890 S/P ARTHROSCOPY OF RIGHT SHOULDER: ICD-10-CM

## 2024-01-29 PROCEDURE — 97110 THERAPEUTIC EXERCISES: CPT | Performed by: PHYSICAL THERAPIST

## 2024-01-29 PROCEDURE — 97140 MANUAL THERAPY 1/> REGIONS: CPT | Performed by: PHYSICAL THERAPIST

## 2024-01-29 NOTE — PROGRESS NOTES
Physical Therapy Treatment Note  [facility] [place]  [address]  [place] [address]    Patient: Jovany Taveras   : 1965  Referring practitioner: Wan Mccormick, *  Date of Initial Visit: Type: THERAPY  Noted: 2024  Today's Date: 2024  Patient seen for 5 sessions    Diagnosis/ICD-10 Codes:    ICD-10-CM ICD-9-CM   1. Acute pain of right shoulder  M25.511 719.41   2. Orthopedic aftercare  Z47.89 V54.9   3. S/P arthroscopy of right shoulder  Z98.890 V45.89   4. Weakness of shoulder  R29.898 719.61   5. Decreased right shoulder range of motion  M25.611 719.51         NEXT MD APPT: 2024    DATE 2024 2024 2024 2024 2024 2024 2024 2024 3/1/2024 3/8/2024   POST OP WEEK 0 1 2 3 4 5 6 7 8 9   DATE 3/15/2024 3/22/2024 3/29/2024 2024 2024 2024 2024 5/3/2024 5/10/2024 2024   POST OP WEEK 10 11 12 13 14 15 16 17 18 19   DATE 2024   POST OP WEEK 20 21 22 23 24 25 26 27 28 29     Post-op Week 3 as of 2024           Subjective       Jovany Taveras reports: Still with tightness which he feels is mainly in front of shoulder.       Objective   See Exercise, Manual, and Modality Logs for complete treatment.     Continued with manual therapy and exercises for elbow and hand mobility.      Passive Range of Motion      Right Shoulder   Flexion: 150 degrees  Abduction: 125 degrees   External rotation @ 45 abd: 65 degrees     Continued as before in accordance with MD protocol.    Assessment/Plan  Doing well and progressing in accordance with MD protocol  slower gains in PROM in all planes.    Continue therapy and progress  in accordance with MD protocol.             Manual Therapy:    25     mins  97588;  Therapeutic Exercise:    15     mins  36925;     Neuromuscular Gilberto:        mins  96191;    Therapeutic Activity:          mins  23710;      Gait Training:           mins  44536;     Ultrasound:          mins  66879;    Electrical Stimulation:         mins  27177 ( );  Dry Needling          mins self-pay    Timed Treatment:   40   mins   Total Treatment:     40   mins    Maynor Redding PT  Physical Therapist

## 2024-02-01 ENCOUNTER — TREATMENT (OUTPATIENT)
Dept: PHYSICAL THERAPY | Facility: CLINIC | Age: 59
End: 2024-02-01
Payer: COMMERCIAL

## 2024-02-01 ENCOUNTER — OFFICE VISIT (OUTPATIENT)
Dept: FAMILY MEDICINE CLINIC | Facility: CLINIC | Age: 59
End: 2024-02-01
Payer: COMMERCIAL

## 2024-02-01 VITALS
DIASTOLIC BLOOD PRESSURE: 77 MMHG | HEART RATE: 73 BPM | RESPIRATION RATE: 16 BRPM | BODY MASS INDEX: 30.01 KG/M2 | WEIGHT: 209.6 LBS | OXYGEN SATURATION: 98 % | TEMPERATURE: 97.3 F | HEIGHT: 70 IN | SYSTOLIC BLOOD PRESSURE: 123 MMHG

## 2024-02-01 DIAGNOSIS — F98.8 ATTENTION DEFICIT DISORDER (ADD) WITHOUT HYPERACTIVITY: ICD-10-CM

## 2024-02-01 DIAGNOSIS — L71.9 ROSACEA: ICD-10-CM

## 2024-02-01 DIAGNOSIS — Z47.89 ORTHOPEDIC AFTERCARE: ICD-10-CM

## 2024-02-01 DIAGNOSIS — L21.9 SEBORRHEIC DERMATITIS: Primary | ICD-10-CM

## 2024-02-01 DIAGNOSIS — Z98.890 S/P ARTHROSCOPY OF RIGHT SHOULDER: ICD-10-CM

## 2024-02-01 DIAGNOSIS — K21.9 GASTROESOPHAGEAL REFLUX DISEASE, UNSPECIFIED WHETHER ESOPHAGITIS PRESENT: ICD-10-CM

## 2024-02-01 DIAGNOSIS — R29.898 WEAKNESS OF SHOULDER: ICD-10-CM

## 2024-02-01 DIAGNOSIS — M25.511 ACUTE PAIN OF RIGHT SHOULDER: Primary | ICD-10-CM

## 2024-02-01 DIAGNOSIS — M25.611 DECREASED RIGHT SHOULDER RANGE OF MOTION: ICD-10-CM

## 2024-02-01 PROBLEM — R21 RASH: Status: ACTIVE | Noted: 2024-02-01

## 2024-02-01 PROCEDURE — 97140 MANUAL THERAPY 1/> REGIONS: CPT | Performed by: PHYSICAL THERAPIST

## 2024-02-01 PROCEDURE — 97110 THERAPEUTIC EXERCISES: CPT | Performed by: PHYSICAL THERAPIST

## 2024-02-01 RX ORDER — PANTOPRAZOLE SODIUM 40 MG/1
40 TABLET, DELAYED RELEASE ORAL DAILY
Qty: 90 TABLET | Refills: 0 | Status: SHIPPED | OUTPATIENT
Start: 2024-02-01

## 2024-02-01 RX ORDER — METRONIDAZOLE 10 MG/G
GEL TOPICAL NIGHTLY
Qty: 55 G | Refills: 0 | Status: SHIPPED | OUTPATIENT
Start: 2024-02-01

## 2024-02-01 RX ORDER — DEXTROAMPHETAMINE SACCHARATE, AMPHETAMINE ASPARTATE, DEXTROAMPHETAMINE SULFATE AND AMPHETAMINE SULFATE 5; 5; 5; 5 MG/1; MG/1; MG/1; MG/1
20 TABLET ORAL 2 TIMES DAILY
Qty: 60 TABLET | Refills: 0 | Status: SHIPPED | OUTPATIENT
Start: 2024-02-01

## 2024-02-01 NOTE — PROGRESS NOTES
Physical Therapy Treatment Note  [facility] [place]  [address]  [place] [address]    Patient: Joavny Taveras   : 1965  Referring practitioner: Wan Mccormick, *  Date of Initial Visit: Type: THERAPY  Noted: 2024  Today's Date: 2024  Patient seen for 6 sessions    Diagnosis/ICD-10 Codes:    ICD-10-CM ICD-9-CM   1. Acute pain of right shoulder  M25.511 719.41   2. Orthopedic aftercare  Z47.89 V54.9   3. S/P arthroscopy of right shoulder  Z98.890 V45.89   4. Weakness of shoulder  R29.898 719.61   5. Decreased right shoulder range of motion  M25.611 719.51         NEXT MD APPT: 2024    DATE 2024 2024 2024 2024 2024 2024 2024 2024 3/1/2024 3/8/2024   POST OP WEEK 0 1 2 3 4 5 6 7 8 9   DATE 3/15/2024 3/22/2024 3/29/2024 2024 2024 2024 2024 5/3/2024 5/10/2024 2024   POST OP WEEK 10 11 12 13 14 15 16 17 18 19   DATE 2024   POST OP WEEK 20 21 22 23 24 25 26 27 28 29     Post-op Week 3 as of 2024           Subjective       Jovany Taveras reports: Improving shoulder motion.    Objective   See Exercise, Manual, and Modality Logs for complete treatment.     Continued with manual therapy and exercises for elbow and hand mobility.      Passive Range of Motion      Right Shoulder   Flexion: 165 degrees  Abduction: 150 degrees   External rotation @ 90 abd: 67 degrees   Internal rotation @ 90 abd: 58 degrees       Continued as before in accordance with MD protocol.    Assessment/Plan  Doing well and progressing in accordance with MD protocol  Good gain in PROM since last visit.    Continue therapy and progress  in accordance with MD protocol.             Manual Therapy:    25     mins  04321;  Therapeutic Exercise:    20     mins  64725;     Neuromuscular Gilberto:        mins  00839;    Therapeutic Activity:          mins  49951;      Gait Training:           mins  22673;     Ultrasound:          mins  55796;    Electrical Stimulation:         mins  05629 ( );  Dry Needling          mins self-pay    Timed Treatment:   45   mins   Total Treatment:     45   mins    Maynor Redding PT  Physical Therapist

## 2024-02-01 NOTE — PROGRESS NOTES
Subjective   Chief Complaint   Patient presents with    Rash     Patient is having skin issues     Heartburn    ADD    Med Refill     Jovany Taveras is a 58 y.o. male.     Patient Care Team:  Debra Frazier MD as PCP - General    History of Present Illness  He is coming in today to discuss some of his symptoms and concerns and also follow-up on his medications.  He reports that for some time he has been noticing some rash on his face, he has noted some skin irritation and scales affecting his forehead, but also his cheeks and going into the eyebrows.  He noted some enlarged vessels covering his nose.  He has been using different products for the skin care and he also uses a small amount of steroid cream which seems to help, but he wonders if there is anything else which possibly can be done.  While in the office today we are also addressing his ADHD and acid reflux.  He has done well with Adderall for a long time and he would like to continue and needs refills.  He also would like to get refill on pantoprazole which he takes as needed for the acid reflux.  He is currently recovering from his right shoulder rotator cuff repair which was done 4 years ago, he is doing physical therapy 2 times a week.       The following portions of the patient's history were reviewed and updated as appropriate: allergies, current medications, past family history, past medical history, past social history, past surgical history, and problem list.  Past Medical History:   Diagnosis Date    ADD (attention deficit disorder)     Allergic     Allergic rhinitis     Bulging lumbar disc     Chlamydia infection     Colon polyp 1989    Genital warts     Heart murmur 1979    Osteoarthritis     Rotator cuff tear     Vitamin D deficiency      Past Surgical History:   Procedure Laterality Date    COLONOSCOPY  09/10/2018    polyps removed; 5 years repeat    SHOULDER ARTHROSCOPY W/ ROTATOR CUFF REPAIR Left 2/19/2021    Procedure: SHOULDER  "ARTHROSCOPY, BANKART REPAIR AND ROTATOR CUFF REPAIR;  Surgeon: Wan Mccormick MD;  Location: [facility] MAIN OR;  Service: Orthopedics;  Laterality: Left;    SHOULDER ARTHROSCOPY W/ ROTATOR CUFF REPAIR Right 1/5/2024    Procedure: SHOULDER ARTHROSCOPY WITH ROTATOR CUFF REPAIR;  Surgeon: Wan Mccormick MD;  Location: [facility] MAIN OR;  Service: Orthopedics;  Laterality: Right;    SHOULDER SURGERY Left 02/19/2021    scope/ bankart repair    TONSILLECTOMY       The patient has a family history of  Family History   Problem Relation Age of Onset    Hypertension Father     Osteoarthritis Father     Osteoporosis Father      Social History     Socioeconomic History    Marital status:    Tobacco Use    Smoking status: Never    Smokeless tobacco: Never    Tobacco comments:     Never!   Vaping Use    Vaping Use: Never used   Substance and Sexual Activity    Alcohol use: Yes     Alcohol/week: 6.0 standard drinks of alcohol     Types: 6 Cans of beer per week    Drug use: No    Sexual activity: Yes     Partners: Female     Birth control/protection: I.U.D.       Review of Systems   Constitutional:  Negative for activity change, chills, fatigue and fever.   Respiratory:  Negative for shortness of breath and wheezing.    Cardiovascular:  Negative for chest pain, palpitations and leg swelling.   Musculoskeletal:  Negative for arthralgias and back pain.   Skin:  Positive for color change, dry skin and rash.   Neurological:  Negative for tremors and headache.   Psychiatric/Behavioral:  Positive for decreased concentration.      Visit Vitals  /77 (BP Location: Left arm, Patient Position: Sitting, Cuff Size: Large Adult)   Pulse 73   Temp 97.3 °F (36.3 °C) (Infrared)   Resp 16   Ht 177.8 cm (70\")   Wt 95.1 kg (209 lb 9.6 oz)   SpO2 98%   BMI 30.07 kg/m²              Current Outpatient Medications:     amphetamine-dextroamphetamine (ADDERALL) 20 MG tablet, Take 1 tablet by mouth 2 (Two) Times a Day., Disp: 60 " tablet, Rfl: 0    betamethasone, augmented, (DIPROLENE) 0.05 % gel, Apply 1 application topically to the appropriate area as directed 2 (Two) Times a Day., Disp: 15 g, Rfl: 0    cyclobenzaprine (FLEXERIL) 10 MG tablet, Take 1 tablet by mouth At Night As Needed for Muscle Spasms., Disp: 20 tablet, Rfl: 0    naproxen (NAPROSYN) 500 MG tablet, Take 1 tablet by mouth 2 (Two) Times a Day With Meals., Disp: 28 tablet, Rfl: 0    pantoprazole (PROTONIX) 40 MG EC tablet, Take 1 tablet by mouth Daily., Disp: 90 tablet, Rfl: 0    metroNIDAZOLE (Metrogel) 1 % gel, Apply  topically to the appropriate area as directed Every Night., Disp: 55 g, Rfl: 0    Objective   Physical Exam  Constitutional:       General: He is not in acute distress.     Appearance: Normal appearance. He is well-developed. He is not ill-appearing or diaphoretic.      Comments: Patient is in no distress, patient has normal voice and speech.  Normal respiratory effort.   HENT:      Head: Normocephalic and atraumatic.   Pulmonary:      Effort: Pulmonary effort is normal.   Musculoskeletal:      Cervical back: Normal range of motion and neck supple.   Skin:     Comments: Some mild skin dryness noted at the area of both eyebrows.  Some skin inflammation and redness noted on both cheeks and nose.   Neurological:      General: No focal deficit present.      Mental Status: He is alert and oriented to person, place, and time. Mental status is at baseline.   Psychiatric:         Mood and Affect: Mood normal.         Assessment & Plan   Diagnoses and all orders for this visit:    1. Seborrheic dermatitis (Primary)  -     Ambulatory Referral to Dermatology    2. Rosacea  -     metroNIDAZOLE (Metrogel) 1 % gel; Apply  topically to the appropriate area as directed Every Night.  Dispense: 55 g; Refill: 0  -     Ambulatory Referral to Dermatology    3. Gastroesophageal reflux disease, unspecified whether esophagitis present  -     pantoprazole (PROTONIX) 40 MG EC tablet;  Take 1 tablet by mouth Daily.  Dispense: 90 tablet; Refill: 0    4. 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  Dispense: 60 tablet; Refill: 0      I will be starting him on MetroGel.  He will continue his current skin care.  His ADD symptoms are stable and well-controlled with Adderall and he may continue the same and refill was given.  I also provided refill on PPI which he takes on as-needed basis for his chronic acid reflux symptoms.      Return in about 6 months (around 8/1/2024) for Next scheduled follow up.    Requested Prescriptions     Signed Prescriptions Disp Refills    metroNIDAZOLE (Metrogel) 1 % gel 55 g 0     Sig: Apply  topically to the appropriate area as directed Every Night.    pantoprazole (PROTONIX) 40 MG EC tablet 90 tablet 0     Sig: Take 1 tablet by mouth Daily.    amphetamine-dextroamphetamine (ADDERALL) 20 MG tablet 60 tablet 0     Sig: Take 1 tablet by mouth 2 (Two) Times a Day.     Answers submitted by the patient for this visit:  Primary Reason for Visit (Submitted on 1/29/2024)  What is the primary reason for your visit?: Other  Other (Submitted on 1/29/2024)  Please describe your symptoms.: Red blotches around my nose, cheeks and eyebrows that will flake skin terribly! I need to know if this is something we can cure, or if it is a symptom of something. Either way, I need to get it treated. Having red blotches on my nose and cheek, and eyebrows and forehead is embarrassing!  Have you had these symptoms before?: Yes  How long have you been having these symptoms?: Greater than 2 weeks  Please list any medications you are currently taking for this condition.: BETAMETHASONE DRPROPRONATE, (AUGMENTED),0.05%  Please describe any probable cause for these symptoms. : I have no idea. That’s why I’m making this appointment.

## 2024-02-05 ENCOUNTER — OFFICE VISIT (OUTPATIENT)
Dept: ORTHOPEDIC SURGERY | Facility: CLINIC | Age: 59
End: 2024-02-05
Payer: COMMERCIAL

## 2024-02-05 VITALS — OXYGEN SATURATION: 98 % | WEIGHT: 209 LBS | BODY MASS INDEX: 29.92 KG/M2 | HEIGHT: 70 IN

## 2024-02-05 DIAGNOSIS — Z47.89 ORTHOPEDIC AFTERCARE: Primary | ICD-10-CM

## 2024-02-05 PROCEDURE — 99024 POSTOP FOLLOW-UP VISIT: CPT | Performed by: ORTHOPAEDIC SURGERY

## 2024-02-05 RX ORDER — MELOXICAM 15 MG/1
15 TABLET ORAL DAILY
Qty: 30 TABLET | Refills: 2 | Status: SHIPPED | OUTPATIENT
Start: 2024-02-05

## 2024-02-05 NOTE — PROGRESS NOTES
Patient ID: Jovany Taveras is a 58 y.o. male.    1/5/24 right shoulder arthroscopy supraspinatus repair   Pain controlled  Objective:    There were no vitals taken for this visit.    Physical Examination:    Incisions are healed passive elevation 120 external rotation 30    Imaging:      Assessment:  Doing well after cuff repair    Plan:  Restrictions discussed. Continue physical therapy. See me in 8 weeks. Sling for 2 weeks.

## 2024-02-13 ENCOUNTER — TREATMENT (OUTPATIENT)
Dept: PHYSICAL THERAPY | Facility: CLINIC | Age: 59
End: 2024-02-13
Payer: COMMERCIAL

## 2024-02-13 DIAGNOSIS — R29.898 WEAKNESS OF SHOULDER: ICD-10-CM

## 2024-02-13 DIAGNOSIS — Z47.89 ORTHOPEDIC AFTERCARE: ICD-10-CM

## 2024-02-13 DIAGNOSIS — M25.611 DECREASED RIGHT SHOULDER RANGE OF MOTION: ICD-10-CM

## 2024-02-13 DIAGNOSIS — M25.511 ACUTE PAIN OF RIGHT SHOULDER: Primary | ICD-10-CM

## 2024-02-13 DIAGNOSIS — Z98.890 S/P ARTHROSCOPY OF RIGHT SHOULDER: ICD-10-CM

## 2024-02-13 PROCEDURE — 97110 THERAPEUTIC EXERCISES: CPT | Performed by: PHYSICAL THERAPIST

## 2024-02-13 PROCEDURE — 97140 MANUAL THERAPY 1/> REGIONS: CPT | Performed by: PHYSICAL THERAPIST

## 2024-02-13 NOTE — PROGRESS NOTES
Physical Therapy Treatment Note  [facility] [place]  [address]  [place] [address]    Patient: Jovany Taveras   : 1965  Referring practitioner: Wan Mccormick, *  Date of Initial Visit: Type: THERAPY  Noted: 2024  Today's Date: 2024  Patient seen for 7 sessions    Diagnosis/ICD-10 Codes:    ICD-10-CM ICD-9-CM   1. Acute pain of right shoulder  M25.511 719.41   2. Orthopedic aftercare  Z47.89 V54.9   3. S/P arthroscopy of right shoulder  Z98.890 V45.89   4. Weakness of shoulder  R29.898 719.61   5. Decreased right shoulder range of motion  M25.611 719.51         NEXT MD APPT: 2024    DATE 2024 2024 2024 2024 2024 2024 2024 2024 3/1/2024 3/8/2024   POST OP WEEK 0 1 2 3 4 5 6 7 8 9   DATE 3/15/2024 3/22/2024 3/29/2024 2024 2024 2024 2024 5/3/2024 5/10/2024 2024   POST OP WEEK 10 11 12 13 14 15 16 17 18 19   DATE 2024   POST OP WEEK 20 21 22 23 24 25 26 27 28 29     Post-op Week 5 as of 2024           Subjective       Jovany Taveras reports: Feels like his shoulder has more motion and he is getting better.  Anxious to move onto next stages of rehabilitation.    Objective   See Exercise, Manual, and Modality Logs for complete treatment.     Continued with manual therapy and exercises for elbow and hand mobility.      Passive Range of Motion      Right Shoulder   Flexion: 165 degrees  Abduction: 150 degrees   External rotation @ 90 abd: 67 degrees   Internal rotation @ 90 abd: 58 degrees       Continued as before in accordance with MD protocol.    Patient education: still with range of motion only per MD protocol.      Assessment/Plan  Doing well and progressing in accordance with MD protocol      Continue therapy and progress  in accordance with MD protocol.             Manual Therapy:    25     mins   75588;  Therapeutic Exercise:    20     mins  66875;     Neuromuscular Gilberto:        mins  30424;    Therapeutic Activity:          mins  63249;     Gait Training:           mins  32843;     Ultrasound:          mins  44656;    Electrical Stimulation:         mins  21544 ( );  Dry Needling          mins self-pay    Timed Treatment:   45   mins   Total Treatment:     45   mins    Maynor Redding PT  Physical Therapist

## 2024-02-15 ENCOUNTER — TREATMENT (OUTPATIENT)
Dept: PHYSICAL THERAPY | Facility: CLINIC | Age: 59
End: 2024-02-15
Payer: COMMERCIAL

## 2024-02-15 DIAGNOSIS — Z98.890 S/P ARTHROSCOPY OF RIGHT SHOULDER: ICD-10-CM

## 2024-02-15 DIAGNOSIS — R29.898 WEAKNESS OF SHOULDER: ICD-10-CM

## 2024-02-15 DIAGNOSIS — Z47.89 ORTHOPEDIC AFTERCARE: ICD-10-CM

## 2024-02-15 DIAGNOSIS — M25.611 DECREASED RIGHT SHOULDER RANGE OF MOTION: ICD-10-CM

## 2024-02-15 DIAGNOSIS — M25.511 ACUTE PAIN OF RIGHT SHOULDER: Primary | ICD-10-CM

## 2024-02-15 PROCEDURE — 97530 THERAPEUTIC ACTIVITIES: CPT | Performed by: PHYSICAL THERAPIST

## 2024-02-15 PROCEDURE — 97110 THERAPEUTIC EXERCISES: CPT | Performed by: PHYSICAL THERAPIST

## 2024-02-15 PROCEDURE — 97112 NEUROMUSCULAR REEDUCATION: CPT | Performed by: PHYSICAL THERAPIST

## 2024-02-15 NOTE — LETTER
Re-Assessment / Progress Note  [facility] [place]  [address]  [place] [address]  Patient: Jovany Taveras   : 1965  Referring practitioner: Wan Mccormick, *  Date of Initial Visit: Episode Type: THERAPY  Noted: 2024    Today's Date: 2/15/2024  Patient seen for 8 sessions.    Diagnosis/ICD-10 Codes:    ICD-10-CM ICD-9-CM   1. Acute pain of right shoulder  M25.511 719.41   2. Orthopedic aftercare  Z47.89 V54.9   3. S/P arthroscopy of right shoulder  Z98.890 V45.89   4. Weakness of shoulder  R29.898 719.61   5. Decreased right shoulder range of motion  M25.611 719.51         Subjective:     Subjective Questionnaire: QuickDASH: 41% (80% at initial evaluation)  Clinical Progress: improved  Home Program Compliance: Yes  Treatment has included: therapeutic exercise, neuromuscular re-education, manual therapy, and cryotherapy    Subjective     Jovany Taveras reports: Feels like his shoulder is doing better.  Some discomfort after manual therapy.    Pain  Current pain ratin  At best pain ratin  At worst pain ratin  Location: right anterior shoulder  Quality: tightness.  Relieving factors: rest  Aggravating factors: movement  Progression: improved      Objective     Passive Range of Motion      Right Shoulder   Flexion: 168 degrees   Abduction: 150 degrees   External rotation 90°: 67 degrees   Internal rotation 90°: 58 degrees      Progress to dowel AAROM exercises    Pulleys vs manual therapy as passive motion is progressing well    Initiated prone rowing    Access Code: [email or web address]  URL: [email or web address]  Date: 02/15/2024  Prepared by: oJnathan Redding    Exercises  - Supine Shoulder Flexion Extension AAROM with Dowel  - 2 x daily - 7 x weekly - 1 sets - 10 reps  - Supine Shoulder External Rotation with Dowel  - 2 x daily - 7 x weekly - 1 sets - 10 reps  - Standing Shoulder Abduction ROM with Dowel  - 2 x daily - 7 x weekly - 1 sets - 10 reps  - Seated Shoulder  "Flexion AAROM with Pulley Behind  - 2 x daily - 7 x weekly - 1 sets - 10 reps  - Seated Shoulder Abduction AAROM with Pulley Behind  - 2 x daily - 7 x weekly - 1 sets - 10 reps  - Prone Shoulder Row  - 1 x daily - 7 x weekly - 3 sets - 10 reps  - Seated Scapular Retraction  - 1 x daily - 7 x weekly - 1 sets - 10 reps - 5 sec hold  - Seated Cervical Retraction  - 1 x daily - 7 x weekly - 1 sets - 10 reps - 5 sec hold      Assessment/Plan  Jovany Taveras is doing very well with his passive range of motion and reports pain levels are not \"too bad\" except with certain motions.  He is progressing in accordance with MD protocol and shoulder ready to progress at 6 week point.    Progress toward previous goals: Partially Met    Goals      Short-term goals (STG): 1/4  Long-term goals (LTG): 0/6      STG (6 weeks)  1) Independent in home program for basic shoulder range of motion (NOT MET)   2) Demonstrates basic understanding of condition and their role in treatment progression (MET)   3) Tolerates initiation of shoulder strengthening  (NOT MET)   4) Demonstrates slight improvement in tolerance to daily activities as evidenced by QuickDash score of 50% or less. (MET)         LTG (12 Weeks)  1) Independent in home program for self-management of his condition (NOT MET)   2) Demonstrates AROM  right shoulder flexion and abduction equal to 170 degrees or greater to allow for use of that upper extremity for reaching at or above head-height (NOT MET)   3) Demonstrates AROM right shoulder internal and external rotation at 90 degrees abduction equal to 80 degrees or greater to allow for use of that upper extremity for dressing and grooming (NOT MET)   4) Demonstrates significant improvement in his tolerance to daily activities as evidenced by QuickDash score of 20% or less. (NOT MET)   5) MMT of right shoulder flexion/abduction/IR/ER equal to 5/5 to allow for ability to safely use that upper extremity for lifting small household " and work objects. (NOT MET)   6) Reports ability to return to work without difficulty or significant shoulder pain.  (NOT MET)     Recommendations: Continue as planned  Frequency: 2 x per week   Timeframe: 8 weeks  Prognosis to achieve goals: good    PT Signature: Maynor Redding PT    Thank you for allowing us to care for your patient!

## 2024-02-15 NOTE — PROGRESS NOTES
Re-Assessment / Progress Note  [facility] [place]  [address]  [place] [address]  Patient: Jovany Taveras   : 1965  Referring practitioner: Wan Mccormick, *  Date of Initial Visit: Episode Type: THERAPY  Noted: 2024    Today's Date: 2/15/2024  Patient seen for 8 sessions.    Diagnosis/ICD-10 Codes:    ICD-10-CM ICD-9-CM   1. Acute pain of right shoulder  M25.511 719.41   2. Orthopedic aftercare  Z47.89 V54.9   3. S/P arthroscopy of right shoulder  Z98.890 V45.89   4. Weakness of shoulder  R29.898 719.61   5. Decreased right shoulder range of motion  M25.611 719.51         Subjective:     Subjective Questionnaire: QuickDASH: 41% (80% at initial evaluation)  Clinical Progress: improved  Home Program Compliance: Yes  Treatment has included: therapeutic exercise, neuromuscular re-education, manual therapy, and cryotherapy    Subjective     Jovany Taveras reports: Feels like his shoulder is doing better.  Some discomfort after manual therapy.    Pain  Current pain ratin  At best pain ratin  At worst pain ratin  Location: right anterior shoulder  Quality: tightness.  Relieving factors: rest  Aggravating factors: movement  Progression: improved      Objective     Passive Range of Motion      Right Shoulder   Flexion: 168 degrees   Abduction: 150 degrees   External rotation 90°: 67 degrees   Internal rotation 90°: 58 degrees      Progress to dowel AAROM exercises    Pulleys vs manual therapy as passive motion is progressing well    Initiated prone rowing    Access Code: [email or web address]  URL: [email or web address]  Date: 02/15/2024  Prepared by: Jonathan Redding    Exercises  - Supine Shoulder Flexion Extension AAROM with Dowel  - 2 x daily - 7 x weekly - 1 sets - 10 reps  - Supine Shoulder External Rotation with Dowel  - 2 x daily - 7 x weekly - 1 sets - 10 reps  - Standing Shoulder Abduction ROM with Dowel  - 2 x daily - 7 x weekly - 1 sets - 10 reps  - Seated Shoulder  "Flexion AAROM with Pulley Behind  - 2 x daily - 7 x weekly - 1 sets - 10 reps  - Seated Shoulder Abduction AAROM with Pulley Behind  - 2 x daily - 7 x weekly - 1 sets - 10 reps  - Prone Shoulder Row  - 1 x daily - 7 x weekly - 3 sets - 10 reps  - Seated Scapular Retraction  - 1 x daily - 7 x weekly - 1 sets - 10 reps - 5 sec hold  - Seated Cervical Retraction  - 1 x daily - 7 x weekly - 1 sets - 10 reps - 5 sec hold      Assessment/Plan  Jovany Taveras is doing very well with his passive range of motion and reports pain levels are not \"too bad\" except with certain motions.  He is progressing in accordance with MD protocol and shoulder ready to progress at 6 week point.    Progress toward previous goals: Partially Met    Goals      Short-term goals (STG): 1/4  Long-term goals (LTG): 0/6      STG (6 weeks)  1) Independent in home program for basic shoulder range of motion (NOT MET)   2) Demonstrates basic understanding of condition and their role in treatment progression (MET)   3) Tolerates initiation of shoulder strengthening  (NOT MET)   4) Demonstrates slight improvement in tolerance to daily activities as evidenced by QuickDash score of 50% or less. (MET)         LTG (12 Weeks)  1) Independent in home program for self-management of his condition (NOT MET)   2) Demonstrates AROM  right shoulder flexion and abduction equal to 170 degrees or greater to allow for use of that upper extremity for reaching at or above head-height (NOT MET)   3) Demonstrates AROM right shoulder internal and external rotation at 90 degrees abduction equal to 80 degrees or greater to allow for use of that upper extremity for dressing and grooming (NOT MET)   4) Demonstrates significant improvement in his tolerance to daily activities as evidenced by QuickDash score of 20% or less. (NOT MET)   5) MMT of right shoulder flexion/abduction/IR/ER equal to 5/5 to allow for ability to safely use that upper extremity for lifting small household " and work objects. (NOT MET)   6) Reports ability to return to work without difficulty or significant shoulder pain.  (NOT MET)     Recommendations: Continue as planned  Frequency: 2 x per week   Timeframe: 8 weeks  Prognosis to achieve goals: good    PT Signature: Maynor Redding PT      My Last Relevant Note    Signed  Encounter Date: 2024     Expand All Collapse All  Physical Therapy Treatment Note  [facility] [place]  [address]  [place] [address]     Patient: Jovany Taveras   : 1965  Referring practitioner: Wan Mccormick, *  Date of Initial Visit: Type: THERAPY  Noted: 2024  Today's Date: 2024  Patient seen for 7 sessions     Diagnosis/ICD-10 Codes:  Visit Diagnosis       ICD-10-CM ICD-9-CM   1. Acute pain of right shoulder  M25.511 719.41   2. Orthopedic aftercare  Z47.89 V54.9   3. S/P arthroscopy of right shoulder  Z98.890 V45.89   4. Weakness of shoulder  R29.898 719.61   5. Decreased right shoulder range of motion  M25.611 719.51              NEXT MD APPT: 2024     DATE 2024 2024 2024 2024 2024 2024 2024 2024 3/1/2024 3/8/2024   POST OP WEEK 0 1 2 3 4 5 6 7 8 9   DATE 3/15/2024 3/22/2024 3/29/2024 2024 2024 2024 2024 5/3/2024 5/10/2024 2024   POST OP WEEK 10 11 12 13 14 15 16 17 18 19   DATE 2024   POST OP WEEK 20 21 22 23 24 25 26 27 28 29      Post-op Week 5 as of 2024               Subjective         Jovany Taveras reports: Feels like his shoulder has more motion and he is getting better.  Anxious to move onto next stages of rehabilitation.    Objective   See Exercise, Manual, and Modality Logs for complete treatment.      Continued with manual therapy and exercises for elbow and hand mobility.        Passive Range of Motion      Right Shoulder   Flexion: 165 degrees  Abduction: 150 degrees    External rotation @ 90 abd: 67 degrees   Internal rotation @ 90 abd: 58 degrees         Continued as before in accordance with MD protocol.     Patient education: still with range of motion only per MD protocol.       Assessment/Plan  Doing well and progressing in accordance with MD protocol       Continue therapy and progress  in accordance with MD protocol.             Manual Therapy:                 mis  50013;  Therapeutic Exercise:    20     mins  71100;     Neuromuscular Gilberto:  10      mins  42218;    Therapeutic Activity:      10     mins  36648;     Gait Training:                      mins  77245;     Ultrasound:                          mins  95720;    Electrical Stimulation:         mins  70765 ( );  Dry Needling                       mins self-pay     Timed Treatment:   40   mins   Total Treatment:     40   mins     Maynor Redding PT  Physical Therapist

## 2024-02-19 PROBLEM — Z80.0: Status: ACTIVE | Noted: 2018-09-10

## 2024-02-22 ENCOUNTER — TREATMENT (OUTPATIENT)
Dept: PHYSICAL THERAPY | Facility: CLINIC | Age: 59
End: 2024-02-22
Payer: COMMERCIAL

## 2024-02-22 DIAGNOSIS — R29.898 WEAKNESS OF SHOULDER: ICD-10-CM

## 2024-02-22 DIAGNOSIS — Z47.89 ORTHOPEDIC AFTERCARE: ICD-10-CM

## 2024-02-22 DIAGNOSIS — M25.511 ACUTE PAIN OF RIGHT SHOULDER: Primary | ICD-10-CM

## 2024-02-22 DIAGNOSIS — M25.611 DECREASED RIGHT SHOULDER RANGE OF MOTION: ICD-10-CM

## 2024-02-22 DIAGNOSIS — Z98.890 S/P ARTHROSCOPY OF RIGHT SHOULDER: ICD-10-CM

## 2024-02-22 PROCEDURE — 97110 THERAPEUTIC EXERCISES: CPT | Performed by: PHYSICAL THERAPIST

## 2024-02-22 NOTE — PROGRESS NOTES
Physical Therapy Treatment Note  [facility] [place]  [address]  [place] [address]    Patient: Jovany Taveras   : 1965  Referring practitioner: No ref. provider found  Date of Initial Visit: Type: THERAPY  Noted: 2024  Today's Date: 2024  Patient seen for 9 sessions    Diagnosis/ICD-10 Codes:    ICD-10-CM ICD-9-CM   1. Acute pain of right shoulder  M25.511 719.41   2. Orthopedic aftercare  Z47.89 V54.9   3. S/P arthroscopy of right shoulder  Z98.890 V45.89   4. Weakness of shoulder  R29.898 719.61   5. Decreased right shoulder range of motion  M25.611 719.51         NEXT MD APPT: 2024    DATE 2024 2024 2024 2024 2024 2024 2024 2024 3/1/2024 3/8/2024   POST OP WEEK 0 1 2 3 4 5 6 7 8 9   DATE 3/15/2024 3/22/2024 3/29/2024 2024 2024 2024 2024 5/3/2024 5/10/2024 2024   POST OP WEEK 10 11 12 13 14 15 16 17 18 19   DATE 2024   POST OP WEEK 20 21 22 23 24 25 26 27 28 29     Post-op Week 5 as of 2024           Subjective       Jovany Taveras reports that his shoulder is doing well. Felt a little stiff this morning when he woke up but it's worked out now.     Objective   See Exercise, Manual, and Modality Logs for complete treatment.     Patient education: instructed pt in AAROM   R shoulder AA flex WFL     Assessment/Plan  Pt demonstrates good ROM with AA exercises. Tightness in the shoulder and did note some discomfort with ER. Improved when decreased amount of shoulder abd. Pt able to perform all other exercises well with no c/o pain.            Manual Therapy:         mins  96107;  Therapeutic Exercise:    23     mins  04171;     Neuromuscular Gilberto:        mins  31861;    Therapeutic Activity:          mins  80640;     Gait Training:           mins  18329;     Ultrasound:          mins  58774;    Electrical Stimulation:          mins  18254 ( );  Dry Needling          mins self-pay    Untimed:  Ice pack:                     10   mins    Timed Treatment:   23   mins   Total Treatment:     33   mins (pt in clinic for ~40 mins total)     Letty Mcmahan, PT  Physical Therapist

## 2024-02-23 ENCOUNTER — TELEPHONE (OUTPATIENT)
Dept: FAMILY MEDICINE CLINIC | Facility: CLINIC | Age: 59
End: 2024-02-23
Payer: COMMERCIAL

## 2024-02-23 NOTE — TELEPHONE ENCOUNTER
CALLED AND LM ON WIFE, DEEPALI'S VM DUE TO MESSAGE WAS SENT THROUGH ON HER CHART INSTEAD OF HIS. I TOLD HER THAT WE CAN TAKE HIM AS A NEW PATIENT WITH DR CHRISTINA, BUT IT WILL BE A COUPLE OF MONTHS BEFORE WE CAN GET HIM IN.

## 2024-02-27 ENCOUNTER — TREATMENT (OUTPATIENT)
Dept: PHYSICAL THERAPY | Facility: CLINIC | Age: 59
End: 2024-02-27
Payer: COMMERCIAL

## 2024-02-27 DIAGNOSIS — Z47.89 ORTHOPEDIC AFTERCARE: ICD-10-CM

## 2024-02-27 DIAGNOSIS — M25.511 ACUTE PAIN OF RIGHT SHOULDER: Primary | ICD-10-CM

## 2024-02-27 DIAGNOSIS — M25.611 DECREASED RIGHT SHOULDER RANGE OF MOTION: ICD-10-CM

## 2024-02-27 DIAGNOSIS — R29.898 WEAKNESS OF SHOULDER: ICD-10-CM

## 2024-02-27 DIAGNOSIS — Z98.890 S/P ARTHROSCOPY OF RIGHT SHOULDER: ICD-10-CM

## 2024-02-27 PROCEDURE — 97112 NEUROMUSCULAR REEDUCATION: CPT | Performed by: PHYSICAL THERAPIST

## 2024-02-27 PROCEDURE — 97110 THERAPEUTIC EXERCISES: CPT | Performed by: PHYSICAL THERAPIST

## 2024-02-27 PROCEDURE — 97530 THERAPEUTIC ACTIVITIES: CPT | Performed by: PHYSICAL THERAPIST

## 2024-02-27 NOTE — PROGRESS NOTES
Physical Therapy Treatment Note  [facility] [place]  [address]  [place] [address]    Patient: Jovany Taveras   : 1965  Referring practitioner: Wan Mccormick, *  Date of Initial Visit: Type: THERAPY  Noted: 2024  Today's Date: 2024  Patient seen for 10 sessions    Diagnosis/ICD-10 Codes:    ICD-10-CM ICD-9-CM   1. Acute pain of right shoulder  M25.511 719.41   2. Orthopedic aftercare  Z47.89 V54.9   3. S/P arthroscopy of right shoulder  Z98.890 V45.89   4. Weakness of shoulder  R29.898 719.61   5. Decreased right shoulder range of motion  M25.611 719.51         NEXT MD APPT: 2024    DATE 2024 2024 2024 2024 2024 2024 2024 2024 3/1/2024 3/8/2024   POST OP WEEK 0 1 2 3 4 5 6 7 8 9   DATE 3/15/2024 3/22/2024 3/29/2024 2024 2024 2024 2024 5/3/2024 5/10/2024 2024   POST OP WEEK 10 11 12 13 14 15 16 17 18 19   DATE 2024   POST OP WEEK 20 21 22 23 24 25 26 27 28 29     Post-op Week 7 as of 2024           Subjective       Jovany Taveras reports his shoulder is doing pretty well.  Some soreness, but usually eases with use of hot tub and moving his arm.    Objective   See Exercise, Manual, and Modality Logs for complete treatment.     Patient education: instructed pt in isometric shoulder strengthening.      R shoulder AA flex WFL     - initiated isometric strengthening     Issued, instructed in & performed home exercise program below:   Access Code: [email or web address]  URL: [email or web address]  Date: 2024  Prepared by: Jonathan Redding    Exercises  - Supine Shoulder External Rotation with Dowel  - 2 x daily - 7 x weekly - 1 sets - 10 reps  - Seated Shoulder Flexion AAROM with Pulley Behind  - 2 x daily - 7 x weekly - 1 sets - 10 reps  - Seated Shoulder Abduction AAROM with Pulley Behind  - 2 x daily - 7 x weekly - 1  sets - 10 reps  - Prone Shoulder Row  - 2 x daily - 7 x weekly - 3 sets - 10 reps  - Seated Scapular Retraction  - 2 x daily - 7 x weekly - 1 sets - 10 reps - 5 sec hold  - Seated Cervical Retraction  - 2 x daily - 7 x weekly - 1 sets - 10 reps - 5 sec hold  - Standing Isometric Shoulder Internal Rotation with Towel Roll at Doorway  - 2 x daily - 7 x weekly - 1 sets - 10 reps - 5 sec hold  - Standing Isometric Shoulder External Rotation with Doorway and Towel Roll  - 2 x daily - 7 x weekly - 1 sets - 10 reps - 5 sec hold  - Standing Isometric Shoulder Flexion with Doorway - Arm Bent  - 2 x daily - 7 x weekly - 1 sets - 10 reps - 5 sec hold  - Standing Isometric Shoulder Extension with Doorway - Arm Bent  - 2 x daily - 7 x weekly - 1 sets - 10 reps - 5 sec hold  - Standing Isometric Shoulder Abduction with Doorway - Arm Bent  - 2 x daily - 7 x weekly - 1 sets - 10 reps - 5 sec hold    Assessment/Plan  PROM and AAROM near normal limits.   Good tolerance to initiation of isometric exercises.  Very eager to progress.    Assess response to today's interventions.  Assess response to home exercise program.    Continue therapy and progress in accordance with MD protocol.                Manual Therapy:         mins  06486;  Therapeutic Exercise:    20   mins  71791;     Neuromuscular Gilberto:    10    mins  46701;    Therapeutic Activity:     10     mins  53649;     Gait Training:           mins  55719;     Ultrasound:          mins  15769;    Electrical Stimulation:         mins  77998 ( );  Dry Needling          mins self-pay      Timed Treatment:   23   mins   Total Treatment:     33   mins     Maynor Redding PT  Physical Therapist

## 2024-02-29 ENCOUNTER — TREATMENT (OUTPATIENT)
Dept: PHYSICAL THERAPY | Facility: CLINIC | Age: 59
End: 2024-02-29
Payer: COMMERCIAL

## 2024-02-29 DIAGNOSIS — M25.611 DECREASED RIGHT SHOULDER RANGE OF MOTION: ICD-10-CM

## 2024-02-29 DIAGNOSIS — Z98.890 S/P ARTHROSCOPY OF RIGHT SHOULDER: ICD-10-CM

## 2024-02-29 DIAGNOSIS — M25.511 ACUTE PAIN OF RIGHT SHOULDER: Primary | ICD-10-CM

## 2024-02-29 DIAGNOSIS — Z47.89 ORTHOPEDIC AFTERCARE: ICD-10-CM

## 2024-02-29 DIAGNOSIS — R29.898 WEAKNESS OF SHOULDER: ICD-10-CM

## 2024-02-29 PROCEDURE — 97112 NEUROMUSCULAR REEDUCATION: CPT | Performed by: PHYSICAL THERAPIST

## 2024-02-29 PROCEDURE — 97530 THERAPEUTIC ACTIVITIES: CPT | Performed by: PHYSICAL THERAPIST

## 2024-02-29 PROCEDURE — 97110 THERAPEUTIC EXERCISES: CPT | Performed by: PHYSICAL THERAPIST

## 2024-02-29 NOTE — PROGRESS NOTES
Physical Therapy Treatment Note  [facility] [place]  [address]  [place] [address]    Patient: Jovany Taveras   : 1965  Referring practitioner: Wan Mccormick, *  Date of Initial Visit: Type: THERAPY  Noted: 2024  Today's Date: 2024  Patient seen for 11 sessions    Diagnosis/ICD-10 Codes:    ICD-10-CM ICD-9-CM   1. Acute pain of right shoulder  M25.511 719.41   2. Orthopedic aftercare  Z47.89 V54.9   3. S/P arthroscopy of right shoulder  Z98.890 V45.89   4. Weakness of shoulder  R29.898 719.61   5. Decreased right shoulder range of motion  M25.611 719.51         NEXT MD APPT: 2024    DATE 2024 2024 2024 2024 2024 2024 2024 2024 3/1/2024 3/8/2024   POST OP WEEK 0 1 2 3 4 5 6 7 8 9   DATE 3/15/2024 3/22/2024 3/29/2024 2024 2024 2024 2024 5/3/2024 5/10/2024 2024   POST OP WEEK 10 11 12 13 14 15 16 17 18 19   DATE 2024   POST OP WEEK 20 21 22 23 24 25 26 27 28 29     Post-op Week 7 as of 2024           Subjective       Jovany Taveras reports his shoulder is doing pretty well.  Continues to have some soreness but feels better after he starts moving it.    Objective   See Exercise, Manual, and Modality Logs for complete treatment.     Patient education: instructed pt in isometric shoulder strengthening.      R shoulder AA flex WFL     Flexion 160  Abd 140      - continued isometric strengthening     Reviewed & performed home exercise program below:   Access Code: [email or web address]  URL: [email or web address]  Date: 2024  Prepared by: Jonathan Redding    Exercises  - Supine Shoulder External Rotation with Dowel  - 2 x daily - 7 x weekly - 1 sets - 10 reps  - Seated Shoulder Flexion AAROM with Pulley Behind  - 2 x daily - 7 x weekly - 1 sets - 10 reps  - Seated Shoulder Abduction AAROM with Pulley Behind  - 2 x daily -  7 x weekly - 1 sets - 10 reps  - Prone Shoulder Row  - 2 x daily - 7 x weekly - 3 sets - 10 reps  - Seated Scapular Retraction  - 2 x daily - 7 x weekly - 1 sets - 10 reps - 5 sec hold  - Seated Cervical Retraction  - 2 x daily - 7 x weekly - 1 sets - 10 reps - 5 sec hold  - Standing Isometric Shoulder Internal Rotation with Towel Roll at Doorway  - 2 x daily - 7 x weekly - 1 sets - 10 reps - 5 sec hold  - Standing Isometric Shoulder External Rotation with Doorway and Towel Roll  - 2 x daily - 7 x weekly - 1 sets - 10 reps - 5 sec hold  - Standing Isometric Shoulder Flexion with Doorway - Arm Bent  - 2 x daily - 7 x weekly - 1 sets - 10 reps - 5 sec hold  - Standing Isometric Shoulder Extension with Doorway - Arm Bent  - 2 x daily - 7 x weekly - 1 sets - 10 reps - 5 sec hold  - Standing Isometric Shoulder Abduction with Doorway - Arm Bent  - 2 x daily - 7 x weekly - 1 sets - 10 reps - 5 sec hold    Assessment/Plan  PROM and AAROM near normal limits.   Good tolerance to initiation of isometric exercises.  Progressing well and in accordance with MD protocol    Assess response to today's interventions.  Assess response to home exercise program.      Continue therapy and progress in accordance with MD protocol.                Manual Therapy:         mins  99662;  Therapeutic Exercise:    20   mins  56774;     Neuromuscular Gilberto:    10    mins  09047;    Therapeutic Activity:     10     mins  62355;     Gait Training:           mins  49628;     Ultrasound:          mins  03702;    Electrical Stimulation:         mins  64503 ( );  Dry Needling          mins self-pay      Timed Treatment:   40   mins   Total Treatment:     40   mins     Maynor Redding PT  Physical Therapist

## 2024-03-05 ENCOUNTER — TREATMENT (OUTPATIENT)
Dept: PHYSICAL THERAPY | Facility: CLINIC | Age: 59
End: 2024-03-05
Payer: COMMERCIAL

## 2024-03-05 DIAGNOSIS — R29.898 WEAKNESS OF SHOULDER: ICD-10-CM

## 2024-03-05 DIAGNOSIS — M25.511 ACUTE PAIN OF RIGHT SHOULDER: Primary | ICD-10-CM

## 2024-03-05 DIAGNOSIS — M25.611 DECREASED RIGHT SHOULDER RANGE OF MOTION: ICD-10-CM

## 2024-03-05 DIAGNOSIS — Z47.89 ORTHOPEDIC AFTERCARE: ICD-10-CM

## 2024-03-05 DIAGNOSIS — Z98.890 S/P ARTHROSCOPY OF RIGHT SHOULDER: ICD-10-CM

## 2024-03-05 PROCEDURE — 97530 THERAPEUTIC ACTIVITIES: CPT | Performed by: PHYSICAL THERAPIST

## 2024-03-05 PROCEDURE — 97110 THERAPEUTIC EXERCISES: CPT | Performed by: PHYSICAL THERAPIST

## 2024-03-05 PROCEDURE — 97112 NEUROMUSCULAR REEDUCATION: CPT | Performed by: PHYSICAL THERAPIST

## 2024-03-05 NOTE — PROGRESS NOTES
Physical Therapy Treatment Note  [facility] [place]  [address]  [place] [address]    Patient: Jovany Taveras   : 1965  Referring practitioner: Wan Mccormick, *  Date of Initial Visit: Type: THERAPY  Noted: 2024  Today's Date: 3/5/2024  Patient seen for 12 sessions    Diagnosis/ICD-10 Codes:    ICD-10-CM ICD-9-CM   1. Acute pain of right shoulder  M25.511 719.41   2. Orthopedic aftercare  Z47.89 V54.9   3. S/P arthroscopy of right shoulder  Z98.890 V45.89   4. Weakness of shoulder  R29.898 719.61   5. Decreased right shoulder range of motion  M25.611 719.51         NEXT MD APPT: 2024    DATE 2024 2024 2024 2024 2024 2024 2024 2024 3/1/2024 3/8/2024   POST OP WEEK 0 1 2 3 4 5 6 7 8 9   DATE 3/15/2024 3/22/2024 3/29/2024 2024 2024 2024 2024 5/3/2024 5/10/2024 2024   POST OP WEEK 10 11 12 13 14 15 16 17 18 19   DATE 2024   POST OP WEEK 20 21 22 23 24 25 26 27 28 29     Post-op Week 8 as of 3/5/2024         Subjective       Jovany Taveras reports he feels like he is getting better.  Still with mild restrictions in shoulder motion and mild soreness.    Objective   See Exercise, Manual, and Modality Logs for complete treatment.     Patient education: instructed pt in isometric shoulder strengthening.      R shoulder AA flex WFL     Flexion 165  Abd 150      -Continued isometric strengthening       Assessment/Plan  PROM and AAROM near normal limits.   Good tolerance to initiation of isometric exercises.  Progressing well and in accordance with MD protocol    Assess response to today's interventions.  Assess response to home exercise program.      Continue therapy and progress in accordance with MD protocol.                Manual Therapy:         mins  46409;  Therapeutic Exercise:    20   mins  44178;     Neuromuscular Gilberto:     10    mins  03818;    Therapeutic Activity:     10     mins  57478;     Gait Training:           mins  45728;     Ultrasound:          mins  99353;    Electrical Stimulation:         mins  54806 ( );  Dry Needling          mins self-pay      Timed Treatment:   40 mins   Total Treatment:     40   mins     Maynor Redding PT  Physical Therapist

## 2024-03-12 ENCOUNTER — TREATMENT (OUTPATIENT)
Dept: PHYSICAL THERAPY | Facility: CLINIC | Age: 59
End: 2024-03-12
Payer: COMMERCIAL

## 2024-03-12 DIAGNOSIS — M25.611 DECREASED RIGHT SHOULDER RANGE OF MOTION: ICD-10-CM

## 2024-03-12 DIAGNOSIS — M25.511 ACUTE PAIN OF RIGHT SHOULDER: Primary | ICD-10-CM

## 2024-03-12 DIAGNOSIS — Z98.890 S/P ARTHROSCOPY OF RIGHT SHOULDER: ICD-10-CM

## 2024-03-12 DIAGNOSIS — R29.898 WEAKNESS OF SHOULDER: ICD-10-CM

## 2024-03-12 DIAGNOSIS — Z47.89 ORTHOPEDIC AFTERCARE: ICD-10-CM

## 2024-03-12 PROCEDURE — 97530 THERAPEUTIC ACTIVITIES: CPT | Performed by: PHYSICAL THERAPIST

## 2024-03-12 PROCEDURE — 97110 THERAPEUTIC EXERCISES: CPT | Performed by: PHYSICAL THERAPIST

## 2024-03-12 PROCEDURE — 97112 NEUROMUSCULAR REEDUCATION: CPT | Performed by: PHYSICAL THERAPIST

## 2024-03-12 NOTE — PROGRESS NOTES
Physical Therapy Treatment Note  [facility] [place] [facility] [place]  [address]  [place] [address]    Patient: Jovany Taveras   : 1965  Referring practitioner: Wan Mccormick, *  Date of Initial Visit: Type: THERAPY  Noted: 2024  Today's Date: 3/12/2024  Patient seen for 13 sessions    Diagnosis/ICD-10 Codes:    ICD-10-CM ICD-9-CM   1. Acute pain of right shoulder  M25.511 719.41   2. Orthopedic aftercare  Z47.89 V54.9   3. S/P arthroscopy of right shoulder  Z98.890 V45.89   4. Weakness of shoulder  R29.898 719.61   5. Decreased right shoulder range of motion  M25.611 719.51         NEXT MD APPT: 2024    DATE 2024 2024 2024 2024 2024 2024 2024 2024 3/1/2024 3/8/2024   POST OP WEEK 0 1 2 3 4 5 6 7 8 9   DATE 3/15/2024 3/22/2024 3/29/2024 2024 2024 2024 2024 5/3/2024 5/10/2024 2024   POST OP WEEK 10 11 12 13 14 15 16 17 18 19   DATE 2024   POST OP WEEK 20 21 22 23 24 25 26 27 28 29     Post-op Week 9 as of 3/12/2024         Subjective       Jovany Taveras reports Still some soreness and pain at times, but overall doing well.    Objective   See Exercise, Manual, and Modality Logs for complete treatment.     Continued  isometric shoulder strengthening; added scapular strengthening & stabilization per MD protocol.    R shoulder AA flex WFL     Flexion 155  Abd 158      Assessment/Plan  PROM and AAROM near normal limits but somewhat less than last visit.   Good tolerance to initiation of  scapular strengthening & stabilization exercises.  Progressing well and in accordance with MD protocol    Assess response to today's interventions.  Assess response to home exercise program.      Continue therapy and progress in accordance with MD protocol.                Manual Therapy:         mins  36083;  Therapeutic Exercise:    20   mins  12646;      Neuromuscular Gilberto:    10    mins  56070;    Therapeutic Activity:     10     mins  89131;     Gait Training:           mins  51871;     Ultrasound:          mins  59495;    Electrical Stimulation:         mins  93027 ( );  Dry Needling          mins self-pay      Timed Treatment:   40 mins   Total Treatment:     40   mins     Maynor Redding PT  Physical Therapist

## 2024-03-14 ENCOUNTER — TREATMENT (OUTPATIENT)
Dept: PHYSICAL THERAPY | Facility: CLINIC | Age: 59
End: 2024-03-14
Payer: COMMERCIAL

## 2024-03-14 DIAGNOSIS — Z47.89 ORTHOPEDIC AFTERCARE: ICD-10-CM

## 2024-03-14 DIAGNOSIS — M25.511 ACUTE PAIN OF RIGHT SHOULDER: Primary | ICD-10-CM

## 2024-03-14 DIAGNOSIS — Z98.890 S/P ARTHROSCOPY OF RIGHT SHOULDER: ICD-10-CM

## 2024-03-14 DIAGNOSIS — M25.611 DECREASED RIGHT SHOULDER RANGE OF MOTION: ICD-10-CM

## 2024-03-14 DIAGNOSIS — R29.898 WEAKNESS OF SHOULDER: ICD-10-CM

## 2024-03-14 PROCEDURE — 97530 THERAPEUTIC ACTIVITIES: CPT | Performed by: PHYSICAL THERAPIST

## 2024-03-14 PROCEDURE — 97112 NEUROMUSCULAR REEDUCATION: CPT | Performed by: PHYSICAL THERAPIST

## 2024-03-14 PROCEDURE — 97110 THERAPEUTIC EXERCISES: CPT | Performed by: PHYSICAL THERAPIST

## 2024-03-14 NOTE — LETTER
Re-Assessment / Progress Note  [facility] [place]  [address]  [place] [address]  Patient: Jovany Taveras   : 1965  Referring practitioner: Wan Mccormick, *  Date of Initial Visit: Episode Type: THERAPY  Noted: 2024    Today's Date: 3/14/2024  Patient seen for 14 sessions.    Diagnosis/ICD-10 Codes:    ICD-10-CM ICD-9-CM   1. Acute pain of right shoulder  M25.511 719.41   2. Orthopedic aftercare  Z47.89 V54.9   3. S/P arthroscopy of right shoulder  Z98.890 V45.89   4. Weakness of shoulder  R29.898 719.61   5. Decreased right shoulder range of motion  M25.611 719.51     NEXT MD APPT: 2024        Post-op Week 9 as of 3/14/2024    Subjective:     Subjective Questionnaire: QuickDASH: 27% (80% at initial evaluation)  Clinical Progress: improved  Home Program Compliance: Yes  Treatment has included: therapeutic exercise, neuromuscular re-education, manual therapy, and cryotherapy    Subjective     Jovany Taveras reports: Feels like his shoulder is doing better.  Some discomfort at times, but overall doing well.    Pain  Current pain ratin  At best pain ratin  At worst pain ratin  Location: right anterior shoulder  Quality: tightness.  Relieving factors: rest  Aggravating factors: movement  Progression: improved      Objective     Passive Range of Motion      Right Shoulder   Flexion: 168 degrees   Abduction: 150 degrees   External rotation 90°: 67 degrees   Internal rotation 90°: 58 degrees      Progress to lashay ROWE exercises    Pulleys vs manual therapy as passive motion is progressing well        Assessment/Plan  Jovany Taveras is doing very well with his passive range of motion and reports shoulder is mostly pain free but does have some occasional pain at times, but this is relieved with rest.  .  He is progressing in accordance with MD protocol.    Progress toward previous goals: Partially Met    Goals      Short-term goals (STG):   Long-term goals (LTG):  0/6      STG (6 weeks)  1) Independent in home program for basic shoulder range of motion (MET)   2) Demonstrates basic understanding of condition and their role in treatment progression (MET)   3) Tolerates initiation of shoulder strengthening  (MET)   4) Demonstrates slight improvement in tolerance to daily activities as evidenced by QuickDash score of 50% or less. (MET)         LTG (12 Weeks)  1) Independent in home program for self-management of his condition (NOT MET)   2) Demonstrates AROM  right shoulder flexion and abduction equal to 170 degrees or greater to allow for use of that upper extremity for reaching at or above head-height (NOT MET)   3) Demonstrates AROM right shoulder internal and external rotation at 90 degrees abduction equal to 80 degrees or greater to allow for use of that upper extremity for dressing and grooming (NOT MET)   4) Demonstrates significant improvement in his tolerance to daily activities as evidenced by QuickDash score of 20% or less. (NOT MET)   5) MMT of right shoulder flexion/abduction/IR/ER equal to 5/5 to allow for ability to safely use that upper extremity for lifting small household and work objects. (NOT MET)   6) Reports ability to return to work without difficulty or significant shoulder pain.  (NOT MET)     Recommendations: Continue as planned  Frequency: 2 x per week   Timeframe: 8 weeks  Prognosis to achieve goals: good    PT Signature: Maynor Redding, PT    Thank you for allowing us to care for your patient!

## 2024-03-14 NOTE — PROGRESS NOTES
Re-Assessment / Progress Note  [facility] [place]  [address]  [place] [address]  Patient: Jovany Taveras   : 1965  Referring practitioner: Wan Mccormick, *  Date of Initial Visit: Episode Type: THERAPY  Noted: 2024    Today's Date: 3/14/2024  Patient seen for 14 sessions.    Diagnosis/ICD-10 Codes:    ICD-10-CM ICD-9-CM   1. Acute pain of right shoulder  M25.511 719.41   2. Orthopedic aftercare  Z47.89 V54.9   3. S/P arthroscopy of right shoulder  Z98.890 V45.89   4. Weakness of shoulder  R29.898 719.61   5. Decreased right shoulder range of motion  M25.611 719.51     NEXT MD APPT: 2024        Post-op Week 9 as of 3/14/2024    Subjective:     Subjective Questionnaire: QuickDASH: 27% (80% at initial evaluation)  Clinical Progress: improved  Home Program Compliance: Yes  Treatment has included: therapeutic exercise, neuromuscular re-education, manual therapy, and cryotherapy    Subjective     Jovany Taveras reports: Feels like his shoulder is doing better.  Some discomfort at times, but overall doing well.    Pain  Current pain ratin  At best pain ratin  At worst pain ratin  Location: right anterior shoulder  Quality: tightness.  Relieving factors: rest  Aggravating factors: movement  Progression: improved      Objective     Passive Range of Motion      Right Shoulder   Flexion: 168 degrees   Abduction: 150 degrees   External rotation 90°: 67 degrees   Internal rotation 90°: 58 degrees      Progress to lashay ROWE exercises    Pulleys vs manual therapy as passive motion is progressing well        Assessment/Plan  Jovany Taveras is doing very well with his passive range of motion and reports shoulder is mostly pain free but does have some occasional pain at times, but this is relieved with rest.  .  He is progressing in accordance with MD protocol.    Progress toward previous goals: Partially Met    Goals      Short-term goals (STG):   Long-term goals (LTG):  0/6      STG (6 weeks)  1) Independent in home program for basic shoulder range of motion (MET)   2) Demonstrates basic understanding of condition and their role in treatment progression (MET)   3) Tolerates initiation of shoulder strengthening  (MET)   4) Demonstrates slight improvement in tolerance to daily activities as evidenced by QuickDash score of 50% or less. (MET)         LTG (12 Weeks)  1) Independent in home program for self-management of his condition (NOT MET)   2) Demonstrates AROM  right shoulder flexion and abduction equal to 170 degrees or greater to allow for use of that upper extremity for reaching at or above head-height (NOT MET)   3) Demonstrates AROM right shoulder internal and external rotation at 90 degrees abduction equal to 80 degrees or greater to allow for use of that upper extremity for dressing and grooming (NOT MET)   4) Demonstrates significant improvement in his tolerance to daily activities as evidenced by QuickDash score of 20% or less. (NOT MET)   5) MMT of right shoulder flexion/abduction/IR/ER equal to 5/5 to allow for ability to safely use that upper extremity for lifting small household and work objects. (NOT MET)   6) Reports ability to return to work without difficulty or significant shoulder pain.  (NOT MET)     Recommendations: Continue as planned  Frequency: 2 x per week   Timeframe: 8 weeks  Prognosis to achieve goals: good    PT Signature: Maynor Redding PT    Manual Therapy:                 mins  04055;  Therapeutic Exercise:    20   mins  45857;     Neuromuscular Gilberto:    10    mins  73495;    Therapeutic Activity:      10     mins  75411;     Gait Training:                      mins  52917;     Ultrasound:                          mins  84179;    Electrical Stimulation:         mins  89569 ( );  Dry Needling                       mins self-pay        Timed Treatment:   40 mins   Total Treatment:     40   mins

## 2024-03-19 ENCOUNTER — TREATMENT (OUTPATIENT)
Dept: PHYSICAL THERAPY | Facility: CLINIC | Age: 59
End: 2024-03-19
Payer: COMMERCIAL

## 2024-03-19 DIAGNOSIS — Z98.890 S/P ARTHROSCOPY OF RIGHT SHOULDER: ICD-10-CM

## 2024-03-19 DIAGNOSIS — M25.611 DECREASED RIGHT SHOULDER RANGE OF MOTION: ICD-10-CM

## 2024-03-19 DIAGNOSIS — F98.8 ATTENTION DEFICIT DISORDER (ADD) WITHOUT HYPERACTIVITY: ICD-10-CM

## 2024-03-19 DIAGNOSIS — M25.511 ACUTE PAIN OF RIGHT SHOULDER: Primary | ICD-10-CM

## 2024-03-19 DIAGNOSIS — Z47.89 ORTHOPEDIC AFTERCARE: ICD-10-CM

## 2024-03-19 DIAGNOSIS — R29.898 WEAKNESS OF SHOULDER: ICD-10-CM

## 2024-03-19 PROCEDURE — 97112 NEUROMUSCULAR REEDUCATION: CPT | Performed by: PHYSICAL THERAPIST

## 2024-03-19 PROCEDURE — 97110 THERAPEUTIC EXERCISES: CPT | Performed by: PHYSICAL THERAPIST

## 2024-03-19 PROCEDURE — 97530 THERAPEUTIC ACTIVITIES: CPT | Performed by: PHYSICAL THERAPIST

## 2024-03-19 RX ORDER — DEXTROAMPHETAMINE SACCHARATE, AMPHETAMINE ASPARTATE, DEXTROAMPHETAMINE SULFATE AND AMPHETAMINE SULFATE 5; 5; 5; 5 MG/1; MG/1; MG/1; MG/1
20 TABLET ORAL 2 TIMES DAILY
Qty: 60 TABLET | Refills: 0 | Status: SHIPPED | OUTPATIENT
Start: 2024-03-19

## 2024-03-19 NOTE — PROGRESS NOTES
Physical Therapy Daily Treatment Note    Patient: Jovany Taveras  : 1965  Referring practitioner: Wan Mccormick, *  Today's Date: 3/19/2024    VISIT#: 15      Subjective   Jovayn Taveras reports: Doing well, feels like everything is getting better.       Objective     See Exercise, Manual, and Modality Logs for complete treatment.     Patient Education: continue HEP    Assessment/Plan  Good  response to session, progressed flexibility/strengthening as tolerated and within protocol limitations. Cues for scapular positioning and form to decrease strain on rotator cuff.      Progress per Plan of Care            Timed:         Manual Therapy:         mins  27492;     Therapeutic Exercise:    10     mins  99359;     Neuromuscular Gilberto:    10    mins  75370;    Therapeutic Activity:     10     mins  34443;     Gait Training:           mins  30598;     Ultrasound:          mins  18522;    Ionto:                                   mins   28799  Self Care:                            mins   52862    Un-Timed:  Electrical Stimulation:         mins  08751 ( );  Dry Needling          mins self-pay  Traction          mins 51377  Re-Eval                               mins  54116    Timed Treatment:   30   mins   Total Treatment:     30   mins    Richelle Sands, PT  Physical Therapist  [place] PT license #: [id number]  [place] PT license #: [id number]

## 2024-03-20 ENCOUNTER — OFFICE (AMBULATORY)
Dept: URBAN - METROPOLITAN AREA PATHOLOGY 19 | Facility: PATHOLOGY | Age: 59
End: 2024-03-20
Payer: COMMERCIAL

## 2024-03-20 ENCOUNTER — ON CAMPUS - OUTPATIENT (AMBULATORY)
Dept: URBAN - METROPOLITAN AREA HOSPITAL 2 | Facility: HOSPITAL | Age: 59
End: 2024-03-20
Payer: COMMERCIAL

## 2024-03-20 VITALS
SYSTOLIC BLOOD PRESSURE: 147 MMHG | OXYGEN SATURATION: 99 % | WEIGHT: 203 LBS | HEART RATE: 60 BPM | HEIGHT: 69 IN | HEART RATE: 57 BPM | RESPIRATION RATE: 16 BRPM | HEART RATE: 56 BPM | SYSTOLIC BLOOD PRESSURE: 158 MMHG | SYSTOLIC BLOOD PRESSURE: 123 MMHG | HEART RATE: 53 BPM | HEART RATE: 65 BPM | DIASTOLIC BLOOD PRESSURE: 79 MMHG | SYSTOLIC BLOOD PRESSURE: 128 MMHG | DIASTOLIC BLOOD PRESSURE: 77 MMHG | DIASTOLIC BLOOD PRESSURE: 86 MMHG | DIASTOLIC BLOOD PRESSURE: 88 MMHG | DIASTOLIC BLOOD PRESSURE: 85 MMHG | DIASTOLIC BLOOD PRESSURE: 92 MMHG | HEART RATE: 70 BPM | DIASTOLIC BLOOD PRESSURE: 73 MMHG | SYSTOLIC BLOOD PRESSURE: 127 MMHG | OXYGEN SATURATION: 100 % | HEART RATE: 66 BPM | TEMPERATURE: 97.8 F | DIASTOLIC BLOOD PRESSURE: 90 MMHG | RESPIRATION RATE: 18 BRPM | SYSTOLIC BLOOD PRESSURE: 117 MMHG | SYSTOLIC BLOOD PRESSURE: 143 MMHG | SYSTOLIC BLOOD PRESSURE: 130 MMHG | DIASTOLIC BLOOD PRESSURE: 100 MMHG | HEART RATE: 63 BPM

## 2024-03-20 DIAGNOSIS — K64.1 SECOND DEGREE HEMORRHOIDS: ICD-10-CM

## 2024-03-20 DIAGNOSIS — D12.3 BENIGN NEOPLASM OF TRANSVERSE COLON: ICD-10-CM

## 2024-03-20 DIAGNOSIS — Z86.010 PERSONAL HISTORY OF COLONIC POLYPS: ICD-10-CM

## 2024-03-20 DIAGNOSIS — Z80.0 FAMILY HISTORY OF MALIGNANT NEOPLASM OF DIGESTIVE ORGANS: ICD-10-CM

## 2024-03-20 DIAGNOSIS — Z09 ENCOUNTER FOR FOLLOW-UP EXAMINATION AFTER COMPLETED TREATMEN: ICD-10-CM

## 2024-03-20 DIAGNOSIS — K63.5 POLYP OF COLON: ICD-10-CM

## 2024-03-20 DIAGNOSIS — K57.30 DIVERTICULOSIS OF LARGE INTESTINE WITHOUT PERFORATION OR ABS: ICD-10-CM

## 2024-03-20 LAB
GI HISTOLOGY: A. TRANSVERSE COLON: (no result)
GI HISTOLOGY: B. DESCENDING COLON: (no result)
GI HISTOLOGY: PDF REPORT: (no result)

## 2024-03-20 PROCEDURE — 45385 COLONOSCOPY W/LESION REMOVAL: CPT | Mod: 33 | Performed by: INTERNAL MEDICINE

## 2024-03-20 PROCEDURE — 88305 TISSUE EXAM BY PATHOLOGIST: CPT | Performed by: PATHOLOGY

## 2024-03-21 ENCOUNTER — OFFICE VISIT (OUTPATIENT)
Dept: FAMILY MEDICINE CLINIC | Facility: CLINIC | Age: 59
End: 2024-03-21
Payer: COMMERCIAL

## 2024-03-21 ENCOUNTER — LAB (OUTPATIENT)
Dept: LAB | Facility: HOSPITAL | Age: 59
End: 2024-03-21
Payer: COMMERCIAL

## 2024-03-21 ENCOUNTER — TREATMENT (OUTPATIENT)
Dept: PHYSICAL THERAPY | Facility: CLINIC | Age: 59
End: 2024-03-21
Payer: COMMERCIAL

## 2024-03-21 VITALS
SYSTOLIC BLOOD PRESSURE: 117 MMHG | WEIGHT: 202.1 LBS | HEART RATE: 59 BPM | RESPIRATION RATE: 16 BRPM | HEIGHT: 70 IN | OXYGEN SATURATION: 98 % | TEMPERATURE: 97.8 F | BODY MASS INDEX: 28.93 KG/M2 | DIASTOLIC BLOOD PRESSURE: 78 MMHG

## 2024-03-21 DIAGNOSIS — M25.611 DECREASED RIGHT SHOULDER RANGE OF MOTION: ICD-10-CM

## 2024-03-21 DIAGNOSIS — Z12.5 PROSTATE CANCER SCREENING: ICD-10-CM

## 2024-03-21 DIAGNOSIS — Z00.00 PREVENTATIVE HEALTH CARE: Primary | ICD-10-CM

## 2024-03-21 DIAGNOSIS — Z98.890 S/P ARTHROSCOPY OF RIGHT SHOULDER: ICD-10-CM

## 2024-03-21 DIAGNOSIS — R29.898 WEAKNESS OF SHOULDER: ICD-10-CM

## 2024-03-21 DIAGNOSIS — Z47.89 ORTHOPEDIC AFTERCARE: ICD-10-CM

## 2024-03-21 DIAGNOSIS — M25.511 ACUTE PAIN OF RIGHT SHOULDER: Primary | ICD-10-CM

## 2024-03-21 LAB
25(OH)D3 SERPL-MCNC: 22.2 NG/ML (ref 30–100)
ALBUMIN SERPL-MCNC: 4.7 G/DL (ref 3.5–5.2)
ALBUMIN/GLOB SERPL: 1.7 G/DL
ALP SERPL-CCNC: 74 U/L (ref 39–117)
ALT SERPL W P-5'-P-CCNC: 30 U/L (ref 1–41)
ANION GAP SERPL CALCULATED.3IONS-SCNC: 12.5 MMOL/L (ref 5–15)
AST SERPL-CCNC: 19 U/L (ref 1–40)
BILIRUB SERPL-MCNC: 0.5 MG/DL (ref 0–1.2)
BUN SERPL-MCNC: 24 MG/DL (ref 6–20)
BUN/CREAT SERPL: 23.5 (ref 7–25)
CALCIUM SPEC-SCNC: 9.6 MG/DL (ref 8.6–10.5)
CHLORIDE SERPL-SCNC: 104 MMOL/L (ref 98–107)
CHOLEST SERPL-MCNC: 242 MG/DL (ref 0–200)
CO2 SERPL-SCNC: 23.5 MMOL/L (ref 22–29)
CREAT SERPL-MCNC: 1.02 MG/DL (ref 0.76–1.27)
EGFRCR SERPLBLD CKD-EPI 2021: 86.3 ML/MIN/1.73
GLOBULIN UR ELPH-MCNC: 2.7 GM/DL
GLUCOSE SERPL-MCNC: 103 MG/DL (ref 65–99)
HBA1C MFR BLD: 5.5 % (ref 4.8–5.6)
HDLC SERPL-MCNC: 67 MG/DL (ref 40–60)
LDLC SERPL CALC-MCNC: 158 MG/DL (ref 0–100)
LDLC/HDLC SERPL: 2.33 {RATIO}
POTASSIUM SERPL-SCNC: 4.5 MMOL/L (ref 3.5–5.2)
PROT SERPL-MCNC: 7.4 G/DL (ref 6–8.5)
PSA SERPL-MCNC: 0.66 NG/ML (ref 0–4)
SODIUM SERPL-SCNC: 140 MMOL/L (ref 136–145)
TRIGL SERPL-MCNC: 96 MG/DL (ref 0–150)
TSH SERPL DL<=0.05 MIU/L-ACNC: 1.07 UIU/ML (ref 0.27–4.2)
VLDLC SERPL-MCNC: 17 MG/DL (ref 5–40)

## 2024-03-21 PROCEDURE — 36415 COLL VENOUS BLD VENIPUNCTURE: CPT | Performed by: FAMILY MEDICINE

## 2024-03-21 PROCEDURE — 80053 COMPREHEN METABOLIC PANEL: CPT | Performed by: FAMILY MEDICINE

## 2024-03-21 PROCEDURE — 97110 THERAPEUTIC EXERCISES: CPT | Performed by: PHYSICAL THERAPIST

## 2024-03-21 PROCEDURE — G0103 PSA SCREENING: HCPCS | Performed by: FAMILY MEDICINE

## 2024-03-21 PROCEDURE — 83036 HEMOGLOBIN GLYCOSYLATED A1C: CPT | Performed by: FAMILY MEDICINE

## 2024-03-21 PROCEDURE — 80061 LIPID PANEL: CPT | Performed by: FAMILY MEDICINE

## 2024-03-21 PROCEDURE — 84443 ASSAY THYROID STIM HORMONE: CPT | Performed by: FAMILY MEDICINE

## 2024-03-21 PROCEDURE — 99396 PREV VISIT EST AGE 40-64: CPT | Performed by: FAMILY MEDICINE

## 2024-03-21 PROCEDURE — 97530 THERAPEUTIC ACTIVITIES: CPT | Performed by: PHYSICAL THERAPIST

## 2024-03-21 PROCEDURE — 82306 VITAMIN D 25 HYDROXY: CPT | Performed by: FAMILY MEDICINE

## 2024-03-21 PROCEDURE — 97112 NEUROMUSCULAR REEDUCATION: CPT | Performed by: PHYSICAL THERAPIST

## 2024-03-21 NOTE — PROGRESS NOTES
Physical Therapy Daily Treatment Note    Patient: Jovany Taveras  : 1965  Referring practitioner: Wan Mccormick, *  Today's Date: 3/21/2024    VISIT#: 16      Subjective   Jovany Taveras reports: Doing well, no issues.       Objective     See Exercise, Manual, and Modality Logs for complete treatment.     Patient Education: continue HEP.     Assessment/Plan  Good response to session, progressed prone exercises. Reinforced importance of not over doing exercises and focusing on form and control. Good response to pec stretch with less pain after.     Progress per Plan of Care            Timed:         Manual Therapy:         mins  15717;     Therapeutic Exercise:    10     mins  52447;     Neuromuscular Gilberto:    10    mins  34501;    Therapeutic Activity:     10     mins  47193;     Gait Training:           mins  24309;     Ultrasound:          mins  99499;    Ionto:                                   mins   46680  Self Care:                            mins   69964    Un-Timed:  Electrical Stimulation:         mins  83840 ( );  Dry Needling          mins self-pay  Traction          mins 67913  Re-Eval                               mins  77251    Timed Treatment:   30   mins   Total Treatment:     30   mins    Richelle Sands, PT  Physical Therapist  [place] PT license #: [id number]  [place] PT license #: [id number]

## 2024-03-21 NOTE — PROGRESS NOTES
Subjective   Chief Complaint   Patient presents with    Annual Exam     Jovany Taveras is a 58 y.o. male.     Patient Care Team:  Debra Frazier MD as PCP - Luiz Deal Jr., MD as Consulting Physician (Dermatology)    History of Present Illness  He is coming in today for his annual wellness exam.  He reports doing well and he denies any new issues or concerns.  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  Patient is to follow-up with his dermatologist down the road.  He is also due for fasting blood work.  He has gained some weight over the last few months and he already started working on diet and healthy lifestyle as he is trying to lose some weight. Patient does not report any chest pain, shortness of breath, dizziness, nausea, vomiting, or diarrhea, visual issues, headaches, numbness or tingling. No urinary issues reported like urgency, frequency, or discomfort upon urination.  No significant weight changes reported.  No swelling reported.  No rashes or any other skin issues reported. No emotional issues or insomnia.       The following portions of the patient's history were reviewed and updated as appropriate: allergies, current medications, past family history, past medical history, past social history, past surgical history, and problem list.  Past Medical History:   Diagnosis Date    ADD (attention deficit disorder)     Allergic     Allergic rhinitis     Bulging lumbar disc     Chlamydia infection     Colon polyp 1989    Genital warts     Heart murmur 1979    Melanoma 02/2024    left calf    Osteoarthritis     Rotator cuff tear     Vitamin D deficiency      Past Surgical History:   Procedure Laterality Date    COLONOSCOPY  09/10/2018    polyps removed; 5 years repeat    SHOULDER ARTHROSCOPY W/ ROTATOR CUFF REPAIR Left 2/19/2021    Procedure: SHOULDER  ARTHROSCOPY, BANKART REPAIR AND ROTATOR CUFF REPAIR;  Surgeon: Wan Mccormick MD;  Location: [facility] MAIN OR;  Service: Orthopedics;  Laterality: Left;    SHOULDER ARTHROSCOPY W/ ROTATOR CUFF REPAIR Right 1/5/2024    Procedure: SHOULDER ARTHROSCOPY WITH ROTATOR CUFF REPAIR;  Surgeon: Wan Mccormick MD;  Location: [facility] MAIN OR;  Service: Orthopedics;  Laterality: Right;    SHOULDER SURGERY Left 02/19/2021    scope/ bankart repair    TONSILLECTOMY       The patient has a family history of  Family History   Problem Relation Age of Onset    Hypertension Father     Osteoarthritis Father     Osteoporosis Father      Social History     Socioeconomic History    Marital status:    Tobacco Use    Smoking status: Never    Smokeless tobacco: Never    Tobacco comments:     Never!   Vaping Use    Vaping status: Never Used   Substance and Sexual Activity    Alcohol use: Yes     Alcohol/week: 6.0 standard drinks of alcohol     Types: 6 Cans of beer per week    Drug use: No    Sexual activity: Yes     Partners: Female     Birth control/protection: I.U.D.       Review of Systems   Constitutional:  Negative for activity change, appetite change, chills, fatigue, fever, unexpected weight gain and unexpected weight loss.   HENT:  Negative for congestion, ear pain, hearing loss, nosebleeds, postnasal drip, swollen glands, tinnitus and trouble swallowing.    Eyes:  Negative for blurred vision, double vision and visual disturbance.   Respiratory:  Negative for cough, choking, chest tightness, shortness of breath, wheezing and stridor.    Cardiovascular:  Negative for chest pain, palpitations and leg swelling.   Gastrointestinal:  Negative for abdominal distention, abdominal pain, anal bleeding, constipation, diarrhea, nausea, vomiting and GERD.   Endocrine: Negative for cold intolerance, heat intolerance and polyphagia.   Genitourinary:  Negative for dysuria, flank pain, frequency, hematuria and urgency.  "  Musculoskeletal:  Negative for arthralgias, back pain, gait problem, joint swelling and neck pain.   Skin:  Negative for color change, dry skin and skin lesions.   Allergic/Immunologic: Negative for environmental allergies and food allergies.   Neurological:  Negative for dizziness, tremors, speech difficulty, light-headedness, numbness, headache and confusion.   Hematological:  Negative for adenopathy. Does not bruise/bleed easily.   Psychiatric/Behavioral:  Negative for decreased concentration, sleep disturbance, depressed mood and stress.      Visit Vitals  /78 (BP Location: Left arm, Patient Position: Sitting, Cuff Size: Large Adult)   Pulse 59   Temp 97.8 °F (36.6 °C) (Infrared)   Resp 16   Ht 177.8 cm (70\")   Wt 91.7 kg (202 lb 1.6 oz)   SpO2 98%   BMI 29.00 kg/m²              Current Outpatient Medications:     amphetamine-dextroamphetamine (ADDERALL) 20 MG tablet, Take 1 tablet by mouth 2 (Two) Times a Day., Disp: 60 tablet, Rfl: 0    betamethasone, augmented, (DIPROLENE) 0.05 % gel, Apply 1 application topically to the appropriate area as directed 2 (Two) Times a Day., Disp: 15 g, Rfl: 0    meloxicam (MOBIC) 15 MG tablet, Take 1 tablet by mouth Daily., Disp: 30 tablet, Rfl: 2    metroNIDAZOLE (Metrogel) 1 % gel, Apply  topically to the appropriate area as directed Every Night., Disp: 55 g, Rfl: 0    pantoprazole (PROTONIX) 40 MG EC tablet, Take 1 tablet by mouth Daily., Disp: 90 tablet, Rfl: 0    Objective   Physical Exam  Vitals and nursing note reviewed.   Constitutional:       General: He is not in acute distress.     Appearance: He is well-developed. He is not diaphoretic.   HENT:      Head: Normocephalic and atraumatic.      Right Ear: External ear normal.      Left Ear: External ear normal.   Eyes:      General: No scleral icterus.        Right eye: No discharge.         Left eye: No discharge.      Conjunctiva/sclera: Conjunctivae normal.      Pupils: Pupils are equal, round, and reactive to " light.   Neck:      Thyroid: No thyromegaly.      Vascular: No JVD.   Cardiovascular:      Rate and Rhythm: Normal rate and regular rhythm.      Heart sounds: Normal heart sounds. No murmur heard.     No friction rub. No gallop.   Pulmonary:      Effort: Pulmonary effort is normal. No respiratory distress.      Breath sounds: Normal breath sounds. No stridor. No wheezing, rhonchi or rales.   Abdominal:      General: Bowel sounds are normal. There is no distension.      Palpations: Abdomen is soft. There is no mass.      Tenderness: There is no abdominal tenderness. There is no guarding or rebound.      Hernia: No hernia is present.   Musculoskeletal:         General: No swelling, tenderness or deformity. Normal range of motion.      Cervical back: Normal range of motion and neck supple. No muscular tenderness.      Right lower leg: No edema.      Left lower leg: No edema.   Lymphadenopathy:      Cervical: No cervical adenopathy.   Skin:     General: Skin is warm and dry.      Capillary Refill: Capillary refill takes less than 2 seconds.      Coloration: Skin is not pale.      Findings: No bruising, erythema, lesion or rash.   Neurological:      General: No focal deficit present.      Mental Status: He is alert and oriented to person, place, and time.      Cranial Nerves: No cranial nerve deficit.      Sensory: No sensory deficit.      Motor: No weakness.      Coordination: Coordination normal.      Deep Tendon Reflexes: Reflexes normal.   Psychiatric:         Mood and Affect: Mood normal.         Behavior: Behavior normal.         Judgment: Judgment normal.         Assessment & Plan   Diagnoses and all orders for this visit:    1. Preventative health care (Primary)  -     Comprehensive Metabolic Panel  -     Lipid Panel  -     Hemoglobin A1c  -     TSH  -     Vitamin D,25-Hydroxy    2. Prostate cancer screening  -     PSA Screen      Wellness exam was done today - see above for details. Healthy life style was  reviewed and discussed and re-enforced. Regular exercise and healthy diet were also discussed and recommended.  I will be getting fasting blood work.  His last colonoscopy was done yesterday and 3-year follow-up colonoscopy was recommended.  He previously completed Shingrix vaccination series.  He will follow-up with his dermatologist down the road.        Return in about 1 year (around 3/21/2025) for Annual physical.    Requested Prescriptions      No prescriptions requested or ordered in this encounter

## 2024-03-22 ENCOUNTER — TELEPHONE (OUTPATIENT)
Dept: FAMILY MEDICINE CLINIC | Facility: CLINIC | Age: 59
End: 2024-03-22
Payer: COMMERCIAL

## 2024-03-22 DIAGNOSIS — Z13.6 SCREENING FOR CARDIOVASCULAR CONDITION: Primary | ICD-10-CM

## 2024-03-22 NOTE — TELEPHONE ENCOUNTER
Hub staff attempted to follow warm transfer process and was unsuccessful     Caller: Jovany Taveras    Relationship to patient: Self    Best call back number: [phone]     Patient is needing: PATIENT IS RETURNING CALL FOR LAB RESULTS          
SPOKE WITH PT   
shortness of breath

## 2024-03-27 ENCOUNTER — TREATMENT (OUTPATIENT)
Dept: PHYSICAL THERAPY | Facility: CLINIC | Age: 59
End: 2024-03-27
Payer: COMMERCIAL

## 2024-03-27 DIAGNOSIS — M25.611 DECREASED RIGHT SHOULDER RANGE OF MOTION: ICD-10-CM

## 2024-03-27 DIAGNOSIS — Z98.890 S/P ARTHROSCOPY OF RIGHT SHOULDER: ICD-10-CM

## 2024-03-27 DIAGNOSIS — R29.898 WEAKNESS OF SHOULDER: ICD-10-CM

## 2024-03-27 DIAGNOSIS — Z47.89 ORTHOPEDIC AFTERCARE: ICD-10-CM

## 2024-03-27 DIAGNOSIS — M25.511 ACUTE PAIN OF RIGHT SHOULDER: Primary | ICD-10-CM

## 2024-03-27 PROCEDURE — 97530 THERAPEUTIC ACTIVITIES: CPT | Performed by: PHYSICAL THERAPIST

## 2024-03-27 PROCEDURE — 97110 THERAPEUTIC EXERCISES: CPT | Performed by: PHYSICAL THERAPIST

## 2024-03-27 PROCEDURE — 97112 NEUROMUSCULAR REEDUCATION: CPT | Performed by: PHYSICAL THERAPIST

## 2024-03-27 NOTE — PROGRESS NOTES
Physical Therapy Treatment Note  [facility] [place]  [address], [address]  [place] [address]    Patient: Jovany Taveras   : 1965  Referring practitioner: Wan Mccormick, *  Date of Initial Visit: Type: THERAPY  Noted: 2024  Today's Date: 3/27/2024  Patient seen for 17 sessions    Diagnosis/ICD-10 Codes:    ICD-10-CM ICD-9-CM   1. Acute pain of right shoulder  M25.511 719.41   2. Orthopedic aftercare  Z47.89 V54.9   3. S/P arthroscopy of right shoulder  Z98.890 V45.89   4. Weakness of shoulder  R29.898 719.61   5. Decreased right shoulder range of motion  M25.611 719.51         NEXT MD APPT: 2024    DATE 2024 2024 2024 2024 2024 2024 2024 2024 3/1/2024 3/8/2024   POST OP WEEK 0 1 2 3 4 5 6 7 8 9   DATE 3/15/2024 3/22/2024 3/29/2024 2024 2024 2024 2024 5/3/2024 5/10/2024 2024   POST OP WEEK 10 11 12 13 14 15 16 17 18 19   DATE 2024   POST OP WEEK 20 21 22 23 24 25 26 27 28 29     Post-op Week 11 as of 3/27/2024         Subjective       Jovany Taveras reports Still some soreness and pain at times, but overall doing well.    Objective   See Exercise, Manual, and Modality Logs for complete treatment.     Continued  isometric shoulder strengthening; added scapular strengthening & stabilization per MD protocol.    R shoulder AA flex WFL     Flexion 155  Abd 158      Assessment/Plan  PROM and AAROM near normal limits but somewhat less than last visit.   Good tolerance to initiation of  scapular strengthening & stabilization exercises.  Progressing well and in accordance with MD protocol    Assess response to today's interventions.  Assess response to home exercise program.      Continue therapy and progress in accordance with MD protocol.                Manual Therapy:         mins  61524;  Therapeutic Exercise:    20   mins  43137;      Neuromuscular Gilberto:    10    mins  14014;    Therapeutic Activity:     10     mins  12997;     Gait Training:           mins  57464;     Ultrasound:          mins  67050;    Electrical Stimulation:         mins  12935 ( );  Dry Needling          mins self-pay      Timed Treatment:   40 mins   Total Treatment:     40   mins     Maynor Redding PT  Physical Therapist

## 2024-04-02 ENCOUNTER — TREATMENT (OUTPATIENT)
Dept: PHYSICAL THERAPY | Facility: CLINIC | Age: 59
End: 2024-04-02
Payer: COMMERCIAL

## 2024-04-02 DIAGNOSIS — R29.898 WEAKNESS OF SHOULDER: ICD-10-CM

## 2024-04-02 DIAGNOSIS — M25.611 DECREASED RIGHT SHOULDER RANGE OF MOTION: ICD-10-CM

## 2024-04-02 DIAGNOSIS — Z98.890 S/P ARTHROSCOPY OF RIGHT SHOULDER: ICD-10-CM

## 2024-04-02 DIAGNOSIS — M25.511 ACUTE PAIN OF RIGHT SHOULDER: Primary | ICD-10-CM

## 2024-04-02 DIAGNOSIS — Z47.89 ORTHOPEDIC AFTERCARE: ICD-10-CM

## 2024-04-02 PROCEDURE — 97110 THERAPEUTIC EXERCISES: CPT | Performed by: PHYSICAL THERAPIST

## 2024-04-02 PROCEDURE — 97530 THERAPEUTIC ACTIVITIES: CPT | Performed by: PHYSICAL THERAPIST

## 2024-04-02 PROCEDURE — 97112 NEUROMUSCULAR REEDUCATION: CPT | Performed by: PHYSICAL THERAPIST

## 2024-04-02 NOTE — PROGRESS NOTES
Physical Therapy Treatment Note  [facility] [place] [facility] [place]  [address], [address]  [place] [address]    Patient: Jovany Taveras   : 1965  Referring practitioner: Wan Mccormick, *  Date of Initial Visit: Type: THERAPY  Noted: 2024  Today's Date: 2024  Patient seen for 18 sessions    Diagnosis/ICD-10 Codes:    ICD-10-CM ICD-9-CM   1. Acute pain of right shoulder  M25.511 719.41   2. Orthopedic aftercare  Z47.89 V54.9   3. S/P arthroscopy of right shoulder  Z98.890 V45.89   4. Weakness of shoulder  R29.898 719.61   5. Decreased right shoulder range of motion  M25.611 719.51         NEXT MD APPT: 2024    DATE 2024 2024 2024 2024 2024 2024 2024 2024 3/1/2024 3/8/2024   POST OP WEEK 0 1 2 3 4 5 6 7 8 9   DATE 3/15/2024 3/22/2024 3/29/2024 2024 2024 2024 2024 5/3/2024 5/10/2024 2024   POST OP WEEK 10 11 12 13 14 15 16 17 18 19   DATE 2024   POST OP WEEK 20 21 22 23 24 25 26 27 28 29     Post-op Week 12 as of 2024         Subjective       Jovany Taveras reports continues to do well except some stiffness at top of motion, especially early in the morning.    Objective   See Exercise, Manual, and Modality Logs for complete treatment.     Continued  isometric shoulder strengthening; scapular strengthening & stabilization per MD protocol.    R shoulder AA flex WFL     Flexion 160  Abd 160      Assessment/Plan  PROM and AAROM near normal limits and improved over last visit. Good tolerance strengthening & stabilization exercises.  Progressing well and in accordance with MD protocol    Assess response to today's interventions.  Assess response to home exercise program.      Continue therapy and progress in accordance with MD protocol.                Manual Therapy:         mins  64683;  Therapeutic Exercise:    20   mins  21595;     Neuromuscular  Gilberto:    10    mins  57704;    Therapeutic Activity:     10     mins  11639;     Gait Training:           mins  40846;     Ultrasound:          mins  12564;    Electrical Stimulation:         mins  06653 ( );  Dry Needling          mins self-pay      Timed Treatment:   40 mins   Total Treatment:     40   mins     Maynor Redding PT  Physical Therapist

## 2024-04-03 ENCOUNTER — TREATMENT (OUTPATIENT)
Dept: PHYSICAL THERAPY | Facility: CLINIC | Age: 59
End: 2024-04-03
Payer: COMMERCIAL

## 2024-04-03 DIAGNOSIS — M25.611 DECREASED RIGHT SHOULDER RANGE OF MOTION: ICD-10-CM

## 2024-04-03 DIAGNOSIS — Z98.890 S/P ARTHROSCOPY OF RIGHT SHOULDER: ICD-10-CM

## 2024-04-03 DIAGNOSIS — Z47.89 ORTHOPEDIC AFTERCARE: ICD-10-CM

## 2024-04-03 DIAGNOSIS — R29.898 WEAKNESS OF SHOULDER: ICD-10-CM

## 2024-04-03 DIAGNOSIS — M25.511 ACUTE PAIN OF RIGHT SHOULDER: Primary | ICD-10-CM

## 2024-04-03 PROCEDURE — 97112 NEUROMUSCULAR REEDUCATION: CPT | Performed by: PHYSICAL THERAPIST

## 2024-04-03 PROCEDURE — 97530 THERAPEUTIC ACTIVITIES: CPT | Performed by: PHYSICAL THERAPIST

## 2024-04-03 PROCEDURE — 97110 THERAPEUTIC EXERCISES: CPT | Performed by: PHYSICAL THERAPIST

## 2024-04-03 NOTE — PROGRESS NOTES
Physical Therapy Treatment Note  [facility] [place]  [address]  [place] [address]    Patient: Jovany Taveras   : 1965  Referring practitioner: Wan Mccormick, *  Date of Initial Visit: Type: THERAPY  Noted: 2024  Today's Date: 4/3/2024  Patient seen for 19 sessions    Diagnosis/ICD-10 Codes:    ICD-10-CM ICD-9-CM   1. Acute pain of right shoulder  M25.511 719.41   2. Orthopedic aftercare  Z47.89 V54.9   3. S/P arthroscopy of right shoulder  Z98.890 V45.89   4. Weakness of shoulder  R29.898 719.61   5. Decreased right shoulder range of motion  M25.611 719.51         NEXT MD APPT: 2024    DATE 2024 2024 2024 2024 2024 2024 2024 2024 3/1/2024 3/8/2024   POST OP WEEK 0 1 2 3 4 5 6 7 8 9   DATE 3/15/2024 3/22/2024 3/29/2024 2024 2024 2024 2024 5/3/2024 5/10/2024 2024   POST OP WEEK 10 11 12 13 14 15 16 17 18 19   DATE 2024   POST OP WEEK 20 21 22 23 24 25 26 27 28 29     Post-op Week 12 as of 4/3/2024         Subjective       Jovany Taveras reports:  Still with mild soreness, otherwise doing well.    Objective   See Exercise, Manual, and Modality Logs for complete treatment.     Continued  isometric shoulder strengthening; added scapular strengthening & stabilization per MD protocol.    R shoulder AA flex WFL     Flexion 160  Abd 160      Assessment/Plan  PROM and AAROM near normal limits.  Good tolerance to strengthening & stabilization exercises.  Progressing well and in accordance with MD protocol    Assess response to today's interventions.  Assess response to home exercise program.      Continue therapy and progress in accordance with MD protocol.                Manual Therapy:         mins  17183;  Therapeutic Exercise:    20   mins  84119;     Neuromuscular Gilberto:    10    mins  14106;    Therapeutic Activity:     10      mins  88131;     Gait Training:           mins  25196;     Ultrasound:          mins  32409;    Electrical Stimulation:         mins  95110 ( );  Dry Needling          mins self-pay      Timed Treatment:   40 mins   Total Treatment:     40   mins     Maynor Redding PT  Physical Therapist

## 2024-04-04 ENCOUNTER — OFFICE VISIT (OUTPATIENT)
Dept: ORTHOPEDIC SURGERY | Facility: CLINIC | Age: 59
End: 2024-04-04
Payer: COMMERCIAL

## 2024-04-04 ENCOUNTER — OFFICE VISIT (OUTPATIENT)
Dept: FAMILY MEDICINE CLINIC | Facility: CLINIC | Age: 59
End: 2024-04-04
Payer: COMMERCIAL

## 2024-04-04 VITALS — WEIGHT: 202 LBS | BODY MASS INDEX: 28.92 KG/M2 | HEART RATE: 76 BPM | HEIGHT: 70 IN

## 2024-04-04 VITALS
BODY MASS INDEX: 28.63 KG/M2 | HEIGHT: 70 IN | HEART RATE: 71 BPM | OXYGEN SATURATION: 98 % | WEIGHT: 200 LBS | RESPIRATION RATE: 16 BRPM | TEMPERATURE: 97.5 F | SYSTOLIC BLOOD PRESSURE: 112 MMHG | DIASTOLIC BLOOD PRESSURE: 75 MMHG

## 2024-04-04 DIAGNOSIS — J06.9 ACUTE URI: Primary | ICD-10-CM

## 2024-04-04 DIAGNOSIS — R05.2 SUBACUTE COUGH: ICD-10-CM

## 2024-04-04 DIAGNOSIS — Z47.89 ORTHOPEDIC AFTERCARE: Primary | ICD-10-CM

## 2024-04-04 PROCEDURE — 99024 POSTOP FOLLOW-UP VISIT: CPT | Performed by: ORTHOPAEDIC SURGERY

## 2024-04-04 RX ORDER — AZITHROMYCIN 250 MG/1
TABLET, FILM COATED ORAL
Qty: 6 TABLET | Refills: 0 | Status: SHIPPED | OUTPATIENT
Start: 2024-04-04

## 2024-04-04 RX ORDER — DOXYCYCLINE 100 MG/1
CAPSULE ORAL
COMMUNITY
Start: 2024-03-26

## 2024-04-04 RX ORDER — DEXTROMETHORPHAN HYDROBROMIDE AND PROMETHAZINE HYDROCHLORIDE 15; 6.25 MG/5ML; MG/5ML
5 SYRUP ORAL 4 TIMES DAILY PRN
Qty: 180 ML | Refills: 0 | Status: SHIPPED | OUTPATIENT
Start: 2024-04-04

## 2024-04-04 NOTE — PROGRESS NOTES
Subjective   Chief Complaint   Patient presents with    Cough     X 3 weeks     Jovany Taveras is a 58 y.o. male.     Patient Care Team:  Debra Frazier MD as PCP - General  Luiz Raymond Jr., MD as Consulting Physician (Dermatology)    History of Present Illness  He is coming in today due to a cough which she has been experiencing for about 3 weeks.  The cough is present throughout the day and at night, at times he coughs up some yellowish sputum.  He denies any fever, chest pains, or difficulty breathing.  He has tried some over-the-counter cough medicines with some temporary relief.  He does not feel necessarily worse, but certainly not any better.  He even took a home COVID-19 test when his symptoms first started and it was negative.       The following portions of the patient's history were reviewed and updated as appropriate: allergies, current medications, past family history, past medical history, past social history, past surgical history, and problem list.  Past Medical History:   Diagnosis Date    ADD (attention deficit disorder)     Allergic     Allergic rhinitis     Bulging lumbar disc     Chlamydia infection     Colon polyp 1989    Genital warts     Heart murmur 1979    Melanoma 02/2024    left calf    Osteoarthritis     Rotator cuff tear     Vitamin D deficiency      Past Surgical History:   Procedure Laterality Date    COLONOSCOPY  09/10/2018    polyps removed; 5 years repeat    SHOULDER ARTHROSCOPY W/ ROTATOR CUFF REPAIR Left 2/19/2021    Procedure: SHOULDER ARTHROSCOPY, BANKART REPAIR AND ROTATOR CUFF REPAIR;  Surgeon: Wan Mccormick MD;  Location: [facility];  Service: Orthopedics;  Laterality: Left;    SHOULDER ARTHROSCOPY W/ ROTATOR CUFF REPAIR Right 1/5/2024    Procedure: SHOULDER ARTHROSCOPY WITH ROTATOR CUFF REPAIR;  Surgeon: Wan Mccormick MD;  Location: [facility] OR;  Service: Orthopedics;  Laterality: Right;    SHOULDER SURGERY Left 02/19/2021    scope/  "bankart repair    TONSILLECTOMY       The patient has a family history of  Family History   Problem Relation Age of Onset    Hypertension Father     Osteoarthritis Father     Osteoporosis Father      Social History     Socioeconomic History    Marital status:    Tobacco Use    Smoking status: Never     Passive exposure: Never    Smokeless tobacco: Never    Tobacco comments:     Never!   Vaping Use    Vaping status: Never Used   Substance and Sexual Activity    Alcohol use: Yes     Alcohol/week: 6.0 standard drinks of alcohol     Types: 6 Cans of beer per week    Drug use: No    Sexual activity: Yes     Partners: Female     Birth control/protection: I.U.D.       Review of Systems   Constitutional:  Negative for activity change, appetite change, chills and fever.   HENT:  Negative for congestion, ear pain, postnasal drip, sinus pressure, sore throat and swollen glands.    Respiratory:  Positive for cough. Negative for choking, chest tightness, shortness of breath, wheezing and stridor.    Cardiovascular:  Negative for chest pain.   Skin:  Negative for dry skin and rash.     Visit Vitals  /75 (BP Location: Left arm, Patient Position: Sitting, Cuff Size: Adult)   Pulse 71   Temp 97.5 °F (36.4 °C) (Infrared)   Resp 16   Ht 177.8 cm (70\")   Wt 90.7 kg (200 lb)   SpO2 98%   BMI 28.70 kg/m²              Current Outpatient Medications:     amphetamine-dextroamphetamine (ADDERALL) 20 MG tablet, Take 1 tablet by mouth 2 (Two) Times a Day., Disp: 60 tablet, Rfl: 0    betamethasone, augmented, (DIPROLENE) 0.05 % gel, Apply 1 application topically to the appropriate area as directed 2 (Two) Times a Day., Disp: 15 g, Rfl: 0    meloxicam (MOBIC) 15 MG tablet, Take 1 tablet by mouth Daily., Disp: 30 tablet, Rfl: 2    metroNIDAZOLE (Metrogel) 1 % gel, Apply  topically to the appropriate area as directed Every Night., Disp: 55 g, Rfl: 0    pantoprazole (PROTONIX) 40 MG EC tablet, Take 1 tablet by mouth Daily., Disp: 90 " tablet, Rfl: 0    azithromycin (Zithromax Z-Jewel) 250 MG tablet, Take 2 tablets by mouth on day 1, then 1 tablet daily on days 2-5, Disp: 6 tablet, Rfl: 0    doxycycline (MONODOX) 100 MG capsule, TAKE 1 CAPSULE BY MOUTH TWICE DAILY WITH FOOD FOR 10 DAYS (Patient not taking: Reported on 4/4/2024), Disp: , Rfl:     promethazine-dextromethorphan (PROMETHAZINE-DM) 6.25-15 MG/5ML syrup, Take 5 mL by mouth 4 (Four) Times a Day As Needed for Cough., Disp: 180 mL, Rfl: 0    Objective   Physical Exam  Vitals and nursing note reviewed.   Constitutional:       General: He is not in acute distress.     Appearance: He is well-developed.   HENT:      Head: Normocephalic and atraumatic.      Right Ear: Tympanic membrane and external ear normal.      Left Ear: Tympanic membrane and external ear normal.      Nose: No congestion.      Mouth/Throat:      Pharynx: No oropharyngeal exudate or posterior oropharyngeal erythema.   Eyes:      Conjunctiva/sclera: Conjunctivae normal.      Pupils: Pupils are equal, round, and reactive to light.   Cardiovascular:      Rate and Rhythm: Normal rate and regular rhythm.      Heart sounds: Normal heart sounds.   Pulmonary:      Effort: Pulmonary effort is normal. No respiratory distress.      Breath sounds: Normal breath sounds. No stridor. No wheezing, rhonchi or rales.   Musculoskeletal:      Cervical back: Normal range of motion and neck supple.   Skin:     General: Skin is warm and dry.      Findings: No rash.         Assessment & Plan   Diagnoses and all orders for this visit:    1. Acute URI (Primary)  -     azithromycin (Zithromax Z-Jewel) 250 MG tablet; Take 2 tablets by mouth on day 1, then 1 tablet daily on days 2-5  Dispense: 6 tablet; Refill: 0    2. Subacute cough  -     XR Chest 2 View; Future  -     promethazine-dextromethorphan (PROMETHAZINE-DM) 6.25-15 MG/5ML syrup; Take 5 mL by mouth 4 (Four) Times a Day As Needed for Cough.  Dispense: 180 mL; Refill: 0      I will be starting him on  Z-Jewel and I will be getting chest x-ray.  I also gave him some cough medicine to take as needed.  Patient is to monitor the symptoms and contact us back if any concerns.      Return if symptoms worsen or fail to improve, for Recheck.    Requested Prescriptions     Signed Prescriptions Disp Refills    azithromycin (Zithromax Z-Jewel) 250 MG tablet 6 tablet 0     Sig: Take 2 tablets by mouth on day 1, then 1 tablet daily on days 2-5    promethazine-dextromethorphan (PROMETHAZINE-DM) 6.25-15 MG/5ML syrup 180 mL 0     Sig: Take 5 mL by mouth 4 (Four) Times a Day As Needed for Cough.       Debra Frazier MD  04/04/2024

## 2024-04-04 NOTE — PROGRESS NOTES
"     Patient ID: Jovany Taveras is a 58 y.o. male.    1/5/24 right shoulder arthroscopy supraspinatus repair   Pain minimal  Objective:    Pulse 76   Ht 177.8 cm (70\")   Wt 91.6 kg (202 lb)   BMI 28.98 kg/m²     Physical Examination:    Incisions are healed passive elevation 180 abduction 150 external rotation 40 active elevation 170 with a negative [place] supraspinatus test    Imaging:      Assessment:  Doing well after cuff repair    Plan  Activity as tolerated and see me as needed   "

## 2024-04-09 ENCOUNTER — TREATMENT (OUTPATIENT)
Dept: PHYSICAL THERAPY | Facility: CLINIC | Age: 59
End: 2024-04-09
Payer: COMMERCIAL

## 2024-04-09 DIAGNOSIS — Z98.890 S/P ARTHROSCOPY OF RIGHT SHOULDER: ICD-10-CM

## 2024-04-09 DIAGNOSIS — Z47.89 ORTHOPEDIC AFTERCARE: ICD-10-CM

## 2024-04-09 DIAGNOSIS — R29.898 WEAKNESS OF SHOULDER: ICD-10-CM

## 2024-04-09 DIAGNOSIS — M25.611 DECREASED RIGHT SHOULDER RANGE OF MOTION: ICD-10-CM

## 2024-04-09 DIAGNOSIS — M25.511 ACUTE PAIN OF RIGHT SHOULDER: Primary | ICD-10-CM

## 2024-04-09 PROCEDURE — 97112 NEUROMUSCULAR REEDUCATION: CPT | Performed by: PHYSICAL THERAPIST

## 2024-04-09 PROCEDURE — 97530 THERAPEUTIC ACTIVITIES: CPT | Performed by: PHYSICAL THERAPIST

## 2024-04-09 PROCEDURE — 97110 THERAPEUTIC EXERCISES: CPT | Performed by: PHYSICAL THERAPIST

## 2024-04-09 NOTE — PROGRESS NOTES
Physical Therapy Treatment Note  [facility] [place] [facility] [place]  [address]  [place] [address]    Patient: Jovany Taveras   : 1965  Referring practitioner: Wan Mccormick, *  Date of Initial Visit: Type: THERAPY  Noted: 2024  Today's Date: 2024  Patient seen for 20 sessions    Diagnosis/ICD-10 Codes:    ICD-10-CM ICD-9-CM   1. Acute pain of right shoulder  M25.511 719.41   2. Orthopedic aftercare  Z47.89 V54.9   3. S/P arthroscopy of right shoulder  Z98.890 V45.89   4. Weakness of shoulder  R29.898 719.61   5. Decreased right shoulder range of motion  M25.611 719.51         NEXT MD APPT: 2024    DATE 2024 2024 2024 2024 2024 2024 2024 2024 3/1/2024 3/8/2024   POST OP WEEK 0 1 2 3 4 5 6 7 8 9   DATE 3/15/2024 3/22/2024 3/29/2024 2024 2024 2024 2024 5/3/2024 5/10/2024 2024   POST OP WEEK 10 11 12 13 14 15 16 17 18 19   DATE 2024   POST OP WEEK 20 21 22 23 24 25 26 27 28 29     Post-op Week 13 as of 2024         Subjective       Jovany Taveras reports:  Feels good with most day to day activities.  Not doing anything overhead.    Objective   See Exercise, Manual, and Modality Logs for complete treatment.     Continued  isometric shoulder strengthening; added scapular strengthening & stabilization per MD protocol.    R shoulder AA flex WFL     Flexion 162  Abd 162      Assessment/Plan  PROM and AAROM near normal limits and improved over last visit.  Good tolerance to strengthening & stabilization exercises.  Progressing well and in accordance with MD protocol    Assess response to today's interventions.  Assess response to home exercise program.      Continue therapy and progress in accordance with MD protocol.                Manual Therapy:         mins  67264;  Therapeutic Exercise:    20   mins  75426;     Neuromuscular Gilberto:     10    mins  34136;    Therapeutic Activity:     10     mins  33817;     Gait Training:           mins  79745;     Ultrasound:          mins  84610;    Electrical Stimulation:         mins  96002 ( );  Dry Needling          mins self-pay      Timed Treatment:   40 mins   Total Treatment:     40   mins     Maynor Redding PT  Physical Therapist

## 2024-04-11 ENCOUNTER — TREATMENT (OUTPATIENT)
Dept: PHYSICAL THERAPY | Facility: CLINIC | Age: 59
End: 2024-04-11
Payer: COMMERCIAL

## 2024-04-11 DIAGNOSIS — M25.511 ACUTE PAIN OF RIGHT SHOULDER: Primary | ICD-10-CM

## 2024-04-11 DIAGNOSIS — Z47.89 ORTHOPEDIC AFTERCARE: ICD-10-CM

## 2024-04-11 DIAGNOSIS — R29.898 WEAKNESS OF SHOULDER: ICD-10-CM

## 2024-04-11 DIAGNOSIS — Z98.890 S/P ARTHROSCOPY OF RIGHT SHOULDER: ICD-10-CM

## 2024-04-11 DIAGNOSIS — M25.611 DECREASED RIGHT SHOULDER RANGE OF MOTION: ICD-10-CM

## 2024-04-11 PROCEDURE — 97530 THERAPEUTIC ACTIVITIES: CPT | Performed by: PHYSICAL THERAPIST

## 2024-04-11 PROCEDURE — 97112 NEUROMUSCULAR REEDUCATION: CPT | Performed by: PHYSICAL THERAPIST

## 2024-04-11 PROCEDURE — 97110 THERAPEUTIC EXERCISES: CPT | Performed by: PHYSICAL THERAPIST

## 2024-04-11 NOTE — PROGRESS NOTES
Re-Assessment / Progress Note  [facility] [place] [facility] [place]  [address]  [place] [address]  Patient: Jovany Taveras   : 1965  Referring practitioner: Wan Mccormick, *  Date of Initial Visit: Episode Type: THERAPY  Noted: 2024    Today's Date: 2024  Patient seen for 21 sessions.    Diagnosis/ICD-10 Codes:    ICD-10-CM ICD-9-CM   1. Acute pain of right shoulder  M25.511 719.41   2. Orthopedic aftercare  Z47.89 V54.9   3. S/P arthroscopy of right shoulder  Z98.890 V45.89   4. Weakness of shoulder  R29.898 719.61   5. Decreased right shoulder range of motion  M25.611 719.51     NEXT MD APPT: 2024     Post-op Week 9 as of 3/14/2024    Subjective:     Subjective Questionnaire: QuickDASH: 0% impairment (80% at initial evaluation)  Subjective Questionnaire: QuickDASH (work) 0% impairment  Subjective Questionnaire: QuickDASH (sports - volleyball) 100% impairment  Clinical Progress: improved  Home Program Compliance: Yes  Treatment has included: therapeutic exercise, neuromuscular re-education, manual therapy, and cryotherapy    Subjective     Jovany Taveras reports: Feels like his shoulder is doing better.  Some discomfort at times, but overall doing well.    Pain  Current pain ratin  At best pain ratin  At worst pain ratin  Location: right anterior shoulder  Quality: tightness.  Relieving factors: rest  Aggravating factors: movement  Progression: improved      Objective     Passive Range of Motion      Right Shoulder   Flexion: 168 degrees   Abduction: 150 degrees   External rotation 90°: 67 degrees   Internal rotation behind the back L1 (T9 left)            Assessment/Plan  Jovany Taveras is doing very well with his range of motion and reports shoulder is mostly pain free but does have some occasional pain at times, but this is relieved with rest.  .  He is progressing in accordance with MD protocol and is now beginning functional strengthening.  Would benefit from  continued PT  to work on strength and stabilization in overhead planes.    Progress toward previous goals: Partially Met    Goals      Short-term goals (STG): 4/4  Long-term goals (LTG): 2/6      STG (6 weeks)  1) Independent in home program for basic shoulder range of motion (MET)   2) Demonstrates basic understanding of condition and their role in treatment progression (MET)   3) Tolerates initiation of shoulder strengthening  (MET)   4) Demonstrates slight improvement in tolerance to daily activities as evidenced by QuickDash score of 50% or less. (MET)         LTG (12 Weeks)  1) Independent in home program for self-management of his condition (NOT MET)   2) Demonstrates AROM  right shoulder flexion and abduction equal to 170 degrees or greater to allow for use of that upper extremity for reaching at or above head-height (NOT MET)   3) Demonstrates AROM right shoulder internal and external rotation at 90 degrees abduction equal to 80 degrees or greater to allow for use of that upper extremity for dressing and grooming (NOT MET)   4) Demonstrates significant improvement in his tolerance to daily activities as evidenced by QuickDash score of 20% or less. (MET)   5) MMT of right shoulder flexion/abduction/IR/ER equal to 5/5 to allow for ability to safely use that upper extremity for lifting small household and work objects. (MET)   6) Reports ability to return to work without difficulty or significant shoulder pain.  (NOT MET)     Recommendations: Continue as planned  Frequency: 2 x per week   Timeframe: 4 weeks  Prognosis to achieve goals: good    PT Signature: Maynor Redding, PT    Manual Therapy:                 mins  62588;  Therapeutic Exercise:    20   mins  57231;     Neuromuscular Gilberto:    10    mins  44947;    Therapeutic Activity:      10     mins  93209;     Gait Training:                      mins  60464;     Ultrasound:                          mins  40921;    Electrical Stimulation:         mins  67694  ( );  Dry Needling                       mins self-pay        Timed Treatment:   40 mins   Total Treatment:     40   mins

## 2024-04-11 NOTE — LETTER
Re-Assessment / Progress Note  [facility] [place] [facility] [place]  [address]  [place] [address]  Patient: Jovany Taveras   : 1965  Referring practitioner: Wan Mccormick, *  Date of Initial Visit: Episode Type: THERAPY  Noted: 2024    Today's Date: 2024  Patient seen for 21 sessions.    Diagnosis/ICD-10 Codes:    ICD-10-CM ICD-9-CM   1. Acute pain of right shoulder  M25.511 719.41   2. Orthopedic aftercare  Z47.89 V54.9   3. S/P arthroscopy of right shoulder  Z98.890 V45.89   4. Weakness of shoulder  R29.898 719.61   5. Decreased right shoulder range of motion  M25.611 719.51     NEXT MD APPT: 2024     Post-op Week 9 as of 3/14/2024    Subjective:     Subjective Questionnaire: QuickDASH: 0% impairment (80% at initial evaluation)  Subjective Questionnaire: QuickDASH (work) 0% impairment  Subjective Questionnaire: QuickDASH (sports - volleyball) 100% impairment  Clinical Progress: improved  Home Program Compliance: Yes  Treatment has included: therapeutic exercise, neuromuscular re-education, manual therapy, and cryotherapy    Subjective     Jovany Taveras reports: Feels like his shoulder is doing better.  Some discomfort at times, but overall doing well.    Pain  Current pain ratin  At best pain ratin  At worst pain ratin  Location: right anterior shoulder  Quality: tightness.  Relieving factors: rest  Aggravating factors: movement  Progression: improved      Objective     Passive Range of Motion      Right Shoulder   Flexion: 168 degrees   Abduction: 150 degrees   External rotation 90°: 67 degrees   Internal rotation behind the back L1 (T9 left)            Assessment/Plan  Jovany Taveras is doing very well with his range of motion and reports shoulder is mostly pain free but does have some occasional pain at times, but this is relieved with rest.  .  He is progressing in accordance with MD protocol and is now beginning functional strengthening.  Would benefit from  continued PT  to work on strength and stabilization in overhead planes.    Progress toward previous goals: Partially Met    Goals      Short-term goals (STG): 4/4  Long-term goals (LTG): 2/6      STG (6 weeks)  1) Independent in home program for basic shoulder range of motion (MET)   2) Demonstrates basic understanding of condition and their role in treatment progression (MET)   3) Tolerates initiation of shoulder strengthening  (MET)   4) Demonstrates slight improvement in tolerance to daily activities as evidenced by QuickDash score of 50% or less. (MET)         LTG (12 Weeks)  1) Independent in home program for self-management of his condition (NOT MET)   2) Demonstrates AROM  right shoulder flexion and abduction equal to 170 degrees or greater to allow for use of that upper extremity for reaching at or above head-height (NOT MET)   3) Demonstrates AROM right shoulder internal and external rotation at 90 degrees abduction equal to 80 degrees or greater to allow for use of that upper extremity for dressing and grooming (NOT MET)   4) Demonstrates significant improvement in his tolerance to daily activities as evidenced by QuickDash score of 20% or less. (MET)   5) MMT of right shoulder flexion/abduction/IR/ER equal to 5/5 to allow for ability to safely use that upper extremity for lifting small household and work objects. (MET)   6) Reports ability to return to work without difficulty or significant shoulder pain.  (NOT MET)     Recommendations: Continue as planned  Frequency: 2 x per week   Timeframe: 4 weeks  Prognosis to achieve goals: good    PT Signature: Maynor Redding, PT    Thank you for allowing us to care for your patient!

## 2024-04-17 DIAGNOSIS — F98.8 ATTENTION DEFICIT DISORDER (ADD) WITHOUT HYPERACTIVITY: ICD-10-CM

## 2024-04-17 RX ORDER — DEXTROAMPHETAMINE SACCHARATE, AMPHETAMINE ASPARTATE, DEXTROAMPHETAMINE SULFATE AND AMPHETAMINE SULFATE 5; 5; 5; 5 MG/1; MG/1; MG/1; MG/1
20 TABLET ORAL 2 TIMES DAILY
Qty: 60 TABLET | Refills: 0 | Status: SHIPPED | OUTPATIENT
Start: 2024-04-17

## 2024-05-06 RX ORDER — MELOXICAM 15 MG/1
15 TABLET ORAL DAILY
Qty: 30 TABLET | Refills: 2 | Status: SHIPPED | OUTPATIENT
Start: 2024-05-06

## 2024-05-24 DIAGNOSIS — F98.8 ATTENTION DEFICIT DISORDER (ADD) WITHOUT HYPERACTIVITY: ICD-10-CM

## 2024-05-24 RX ORDER — DEXTROAMPHETAMINE SACCHARATE, AMPHETAMINE ASPARTATE, DEXTROAMPHETAMINE SULFATE AND AMPHETAMINE SULFATE 5; 5; 5; 5 MG/1; MG/1; MG/1; MG/1
20 TABLET ORAL 2 TIMES DAILY
Qty: 60 TABLET | Refills: 0 | Status: SHIPPED | OUTPATIENT
Start: 2024-05-24

## 2024-05-29 ENCOUNTER — TREATMENT (OUTPATIENT)
Dept: PHYSICAL THERAPY | Facility: CLINIC | Age: 59
End: 2024-05-29
Payer: COMMERCIAL

## 2024-05-29 DIAGNOSIS — M25.611 DECREASED RIGHT SHOULDER RANGE OF MOTION: ICD-10-CM

## 2024-05-29 DIAGNOSIS — R29.898 WEAKNESS OF SHOULDER: ICD-10-CM

## 2024-05-29 DIAGNOSIS — Z98.890 S/P ARTHROSCOPY OF RIGHT SHOULDER: ICD-10-CM

## 2024-05-29 DIAGNOSIS — Z47.89 ORTHOPEDIC AFTERCARE: ICD-10-CM

## 2024-05-29 DIAGNOSIS — M25.511 ACUTE PAIN OF RIGHT SHOULDER: Primary | ICD-10-CM

## 2024-05-29 PROCEDURE — 97110 THERAPEUTIC EXERCISES: CPT | Performed by: PHYSICAL THERAPIST

## 2024-05-29 PROCEDURE — 97530 THERAPEUTIC ACTIVITIES: CPT | Performed by: PHYSICAL THERAPIST

## 2024-05-29 NOTE — LETTER
Re-Assessment / Progress Note  [facility] [place]  [address]  [place] [address]  Patient: Jovany Taveras   : 1965  Referring practitioner: Wan Mccormick, *  Date of Initial Visit: Episode Type: THERAPY  Noted: 2024    Today's Date: 2024  Patient seen for 22 sessions.    Diagnosis/ICD-10 Codes:    ICD-10-CM ICD-9-CM   1. Acute pain of right shoulder  M25.511 719.41   2. Orthopedic aftercare  Z47.89 V54.9   3. S/P arthroscopy of right shoulder  Z98.890 V45.89   4. Weakness of shoulder  R29.898 719.61   5. Decreased right shoulder range of motion  M25.611 719.51     NEXT MD APPT: 2024     Post-op Week 9 as of 3/14/2024    Subjective:     Subjective Questionnaire: QuickDASH: 30% impairment (80% at initial evaluation)  Subjective Questionnaire: QuickDASH (work) 0% impairment  Subjective Questionnaire: QuickDASH (sports - volleyball) 100% impairment  Clinical Progress: improved  Home Program Compliance: Yes  Treatment has included: therapeutic exercise, neuromuscular re-education, manual therapy, and cryotherapy    Subjective     Jovany Taveras reports: has been having some shoulder pain since performing overhead motions with weights with body pump classes.  He reports not attending therapy since  due to shoulder feeling good and moving well overall.  He is concerned that he may have injured his surgical repair.    Pain  Current pain ratin  At best pain ratin  At worst pain ratin  Location: right anterior shoulder  Quality: tightness.  Relieving factors: rest  Aggravating factors: movement  Progression: improved      Objective     Patient returns to PT for first visit since 2024 (post-op week 9).    Passive Range of Motion      Right Shoulder   Flexion: 180 degrees   Abduction: 180 degrees   External rotation at 0 ABD: 4+  Internal rotation behind the back L2 (T9 left)  Internal rotation 90°Abd:45    MMT:  Right Shoulder   Flexion: 5  Abduction:  4+   External rotation 0°: 4+  External rotation 90°: 4+  Internal rotation 0°Abd:5  Internal rotation 90°Abd: 4+  Scaption: 4+    Belly Press & Lift Off:  5/5 and negative for pain  Full and Empty can:  4+/5 and negative for pain    See Exercise, Manual, and Modality Logs for complete treatment    Issued, instructed in & performed home exercise program below:   Access Code: [email or web address]  URL: [email or web address]  Date: 05/29/2024  Prepared by: Jonathan Redding    Program Notes  advise: do not preform weighed overhead exercises    Exercises  - Sleeper Stretch  - 1 x daily - 7 x weekly - 1 sets - 10 reps - 5 sec hold  - Standing Shoulder Internal Rotation AAROM with Pulley  - 1 x daily - 7 x weekly - 1 sets - 10 reps  - Standing Single Arm Shoulder Internal Rotation in Abduction with Anchored Resistance  - 1 x daily - 7 x weekly - 2 sets - 10 reps  - Standing Single Arm Shoulder External Rotation in Abduction with Anchored Resistance  - 1 x daily - 7 x weekly - 2 sets - 10 reps      Assessment/Plan  Jovany Taveras is doing very well with his range of motion and reports shoulder is mostly pain free but has had pain with weighted overhead activities.  Have explained to him that he needs to progress his rehabilitation to overhead activities and needs to avoid weighted overhead activities over 5# if possible and to definitely avoid katelyn speed overhead activities.  He does not test (+) for injury of cuff, but lightly is getting impingement from lack of strength in overhead planes.  Would benefit from continued PT  to work on strength and stabilization in overhead planes.    Progress toward previous goals: Partially Met    Goals      Short-term goals (STG): 4/4  Long-term goals (LTG):  5/6      STG (6 weeks)  1) Independent in home program for basic shoulder range of motion (MET)   2) Demonstrates basic understanding of condition and their role in treatment progression (MET)   3) Tolerates initiation of shoulder  strengthening  (MET)   4) Demonstrates slight improvement in tolerance to daily activities as evidenced by QuickDash score of 50% or less. (MET)         LTG (12 Weeks)  1) Independent in home program for self-management of his condition (NOT MET)   2) Demonstrates AROM  right shoulder flexion and abduction equal to 170 degrees or greater to allow for use of that upper extremity for reaching at or above head-height (MET)   3) Demonstrates AROM right shoulder internal and external rotation at 90 degrees abduction equal to 80 degrees or greater to allow for use of that upper extremity for dressing and grooming (MET)   4) Demonstrates significant improvement in his tolerance to daily activities as evidenced by QuickDash score of 20% or less. (MET)   5) MMT of right shoulder flexion/abduction/IR/ER equal to 5/5 to allow for ability to safely use that upper extremity for lifting small household and work objects. (MET)   6) Reports ability to return to work without difficulty or significant shoulder pain.  (MET)     Recommendations: Continue as planned  Frequency: 1 x per week   Timeframe: 6 weeks  Prognosis to achieve goals: good    PT Signature: Maynor Redding PT    Thank you for allowing us to care for your patient!

## 2024-05-29 NOTE — PROGRESS NOTES
Re-Assessment / Progress Note  [facility] [place] [place] [facility] [place]  [address]  [place] [address]  Patient: Jovany Taveras   : 1965  Referring practitioner: Wan Mccormick, *  Date of Initial Visit: Episode Type: THERAPY  Noted: 2024    Today's Date: 2024  Patient seen for 22 sessions.    Diagnosis/ICD-10 Codes:    ICD-10-CM ICD-9-CM   1. Acute pain of right shoulder  M25.511 719.41   2. Orthopedic aftercare  Z47.89 V54.9   3. S/P arthroscopy of right shoulder  Z98.890 V45.89   4. Weakness of shoulder  R29.898 719.61   5. Decreased right shoulder range of motion  M25.611 719.51     NEXT MD APPT: 2024     Post-op Week 9 as of 3/14/2024    Subjective:     Subjective Questionnaire: QuickDASH: 30% impairment (80% at initial evaluation)  Subjective Questionnaire: QuickDASH (work) 0% impairment  Subjective Questionnaire: QuickDASH (sports - volleyball) 100% impairment  Clinical Progress: improved  Home Program Compliance: Yes  Treatment has included: therapeutic exercise, neuromuscular re-education, manual therapy, and cryotherapy    Subjective     Jovany Taveras reports: has been having some shoulder pain since performing overhead motions with weights with body pump classes.  He reports not attending therapy since  due to shoulder feeling good and moving well overall.  He is concerned that he may have injured his surgical repair.    Pain  Current pain ratin  At best pain ratin  At worst pain ratin  Location: right anterior shoulder  Quality: tightness.  Relieving factors: rest  Aggravating factors: movement  Progression: improved      Objective     Patient returns to PT for first visit since 2024 (post-op week 9).    Passive Range of Motion      Right Shoulder   Flexion: 180 degrees   Abduction: 180 degrees   External rotation at 0 ABD: 4+  Internal rotation behind the back L2 (T9 left)  Internal rotation 90°Abd:45    MMT:  Right Shoulder   Flexion: 5  Abduction:  4+   External rotation 0°: 4+  External rotation 90°: 4+  Internal rotation 0°Abd:5  Internal rotation 90°Abd: 4+  Scaption: 4+    Belly Press & Lift Off:  5/5 and negative for pain  Full and Empty can:  4+/5 and negative for pain    See Exercise, Manual, and Modality Logs for complete treatment    Issued, instructed in & performed home exercise program below:   Access Code: [email or web address]  URL: [email or web address]  Date: 05/29/2024  Prepared by: Jonathan Redding    Program Notes  advise: do not preform weighed overhead exercises    Exercises  - Sleeper Stretch  - 1 x daily - 7 x weekly - 1 sets - 10 reps - 5 sec hold  - Standing Shoulder Internal Rotation AAROM with Pulley  - 1 x daily - 7 x weekly - 1 sets - 10 reps  - Standing Single Arm Shoulder Internal Rotation in Abduction with Anchored Resistance  - 1 x daily - 7 x weekly - 2 sets - 10 reps  - Standing Single Arm Shoulder External Rotation in Abduction with Anchored Resistance  - 1 x daily - 7 x weekly - 2 sets - 10 reps      Assessment/Plan  Jovany Taveras is doing very well with his range of motion and reports shoulder is mostly pain free but has had pain with weighted overhead activities.  Have explained to him that he needs to progress his rehabilitation to overhead activities and needs to avoid weighted overhead activities over 5# if possible and to definitely avoid katelyn speed overhead activities.  He does not test (+) for injury of cuff, but lightly is getting impingement from lack of strength in overhead planes.  Would benefit from continued PT  to work on strength and stabilization in overhead planes.    Progress toward previous goals: Partially Met    Goals      Short-term goals (STG): 4/4  Long-term goals (LTG):  5/6      STG (6 weeks)  1) Independent in home program for basic shoulder range of motion (MET)   2) Demonstrates basic understanding of condition and their role in treatment progression (MET)   3) Tolerates initiation of shoulder  strengthening  (MET)   4) Demonstrates slight improvement in tolerance to daily activities as evidenced by QuickDash score of 50% or less. (MET)         LTG (12 Weeks)  1) Independent in home program for self-management of his condition (NOT MET)   2) Demonstrates AROM  right shoulder flexion and abduction equal to 170 degrees or greater to allow for use of that upper extremity for reaching at or above head-height (MET)   3) Demonstrates AROM right shoulder internal and external rotation at 90 degrees abduction equal to 80 degrees or greater to allow for use of that upper extremity for dressing and grooming (MET)   4) Demonstrates significant improvement in his tolerance to daily activities as evidenced by QuickDash score of 20% or less. (MET)   5) MMT of right shoulder flexion/abduction/IR/ER equal to 5/5 to allow for ability to safely use that upper extremity for lifting small household and work objects. (MET)   6) Reports ability to return to work without difficulty or significant shoulder pain.  (MET)     Recommendations: Continue as planned  Frequency: 1 x per week   Timeframe: 6 weeks  Prognosis to achieve goals: good    PT Signature: Maynor Redding PT    Manual Therapy:                 mins  69439;  Therapeutic Exercise:    15   mins  76797;     Neuromuscular Gilberto:        mins  10574;    Therapeutic Activity:      25     mins  07338;     Gait Training:                      mins  88711;     Ultrasound:                          mins  56706;    Electrical Stimulation:         mins  08134 ( );  Dry Needling                       mins self-pay        Timed Treatment:   40 mins   Total Treatment:     40   mins

## 2024-06-04 ENCOUNTER — TREATMENT (OUTPATIENT)
Dept: PHYSICAL THERAPY | Facility: CLINIC | Age: 59
End: 2024-06-04
Payer: COMMERCIAL

## 2024-06-04 DIAGNOSIS — R29.898 WEAKNESS OF SHOULDER: ICD-10-CM

## 2024-06-04 DIAGNOSIS — M25.511 ACUTE PAIN OF RIGHT SHOULDER: Primary | ICD-10-CM

## 2024-06-04 DIAGNOSIS — Z98.890 S/P ARTHROSCOPY OF RIGHT SHOULDER: ICD-10-CM

## 2024-06-04 DIAGNOSIS — M25.611 DECREASED RIGHT SHOULDER RANGE OF MOTION: ICD-10-CM

## 2024-06-04 DIAGNOSIS — Z47.89 ORTHOPEDIC AFTERCARE: ICD-10-CM

## 2024-06-04 NOTE — PROGRESS NOTES
Physical Therapy Treatment Note  [facility] [place]  [address]  [place] [address]    Patient: Jovany Taveras   : 1965  Referring practitioner: Wan Mccormick, *  Date of Initial Visit: Type: THERAPY  Noted: 2024  Today's Date: 2024  Patient seen for 23 sessions    Diagnosis/ICD-10 Codes:    ICD-10-CM ICD-9-CM   1. Acute pain of right shoulder  M25.511 719.41   2. Orthopedic aftercare  Z47.89 V54.9   3. S/P arthroscopy of right shoulder  Z98.890 V45.89   4. Weakness of shoulder  R29.898 719.61   5. Decreased right shoulder range of motion  M25.611 719.51         NEXT MD APPT: 2024    DATE 2024 2024 2024 2024 2024 2024 2024 2024 3/1/2024 3/8/2024   POST OP WEEK 0 1 2 3 4 5 6 7 8 9   DATE 3/15/2024 3/22/2024 3/29/2024 2024 2024 2024 2024 5/3/2024 5/10/2024 2024   POST OP WEEK 10 11 12 13 14 15 16 17 18 19   DATE 2024   POST OP WEEK 20 21 22 23 24 25 26 27 28 29     Post-op Week 13 as of 2024         Subjective       Jovany Taveras reports:  Feels good with most day to day activities.  Not doing anything overhead.    Objective   See Exercise, Manual, and Modality Logs for complete treatment.     Continued  isometric shoulder strengthening; added scapular strengthening & stabilization per MD protocol.    R shoulder AA flex WFL     Flexion 162  Abd 162      Assessment/Plan  PROM and AAROM near normal limits and improved over last visit.  Good tolerance to strengthening & stabilization exercises.  Progressing well and in accordance with MD protocol    Assess response to today's interventions.  Assess response to home exercise program.      Continue therapy and progress in accordance with MD protocol.                Manual Therapy:         mins  36216;  Therapeutic Exercise:    20   mins  93114;     Neuromuscular Gilberto:     10    mins  89892;    Therapeutic Activity:     10     mins  78155;     Gait Training:           mins  38886;     Ultrasound:          mins  89107;    Electrical Stimulation:         mins  34003 ( );  Dry Needling          mins self-pay      Timed Treatment:   40 mins   Total Treatment:     40   mins     Maynor Redding PT  Physical Therapist

## 2024-06-10 ENCOUNTER — HOSPITAL ENCOUNTER (OUTPATIENT)
Dept: CARDIOLOGY | Facility: HOSPITAL | Age: 59
Discharge: HOME | End: 2024-06-10

## 2024-06-10 ENCOUNTER — HOSPITAL ENCOUNTER (OUTPATIENT)
Dept: CT IMAGING | Facility: HOSPITAL | Age: 59
Discharge: HOME | End: 2024-06-10

## 2024-06-10 DIAGNOSIS — Z13.6 SCREENING FOR CARDIOVASCULAR CONDITION: ICD-10-CM

## 2024-06-10 PROCEDURE — 93799 UNLISTED CV SVC/PROCEDURE: CPT

## 2024-06-10 PROCEDURE — 75571 CT HRT W/O DYE W/CA TEST: CPT

## 2024-06-11 LAB
BH CV VAS SCREENING CAROTID CCA LEFT: 75 CM/SEC
BH CV VAS SCREENING CAROTID CCA RIGHT: 75 CM/SEC
BH CV VAS SCREENING CAROTID ICA LEFT: 48 CM/SEC
BH CV VAS SCREENING CAROTID ICA RIGHT: 63 CM/SEC
BH CV XLRA MEAS - MID AO DIAM: 2.1 CM
BH CV XLRA MEAS - PAD LEFT ABI PT: 1.17
BH CV XLRA MEAS - PAD LEFT ARM: 124 MMHG
BH CV XLRA MEAS - PAD LEFT LEG PT: 145 MMHG
BH CV XLRA MEAS - PAD RIGHT ABI PT: 1.09
BH CV XLRA MEAS - PAD RIGHT ARM: 118 MMHG
BH CV XLRA MEAS - PAD RIGHT LEG PT: 135 MMHG
BH CV XLRA MEAS LEFT DIST CCA EDV: -20.5 CM/SEC
BH CV XLRA MEAS LEFT DIST CCA PSV: -75.2 CM/SEC
BH CV XLRA MEAS LEFT ICA/CCA RATIO: 0.6
BH CV XLRA MEAS LEFT PROX ICA EDV: -23 CM/SEC
BH CV XLRA MEAS LEFT PROX ICA PSV: -47.8 CM/SEC
BH CV XLRA MEAS RIGHT DIST CCA EDV: -21.1 CM/SEC
BH CV XLRA MEAS RIGHT DIST CCA PSV: -75.2 CM/SEC
BH CV XLRA MEAS RIGHT ICA/CCA RATIO: 0.8
BH CV XLRA MEAS RIGHT PROX ICA EDV: -22.4 CM/SEC
BH CV XLRA MEAS RIGHT PROX ICA PSV: -63.4 CM/SEC

## 2024-06-18 ENCOUNTER — TREATMENT (OUTPATIENT)
Dept: PHYSICAL THERAPY | Facility: CLINIC | Age: 59
End: 2024-06-18
Payer: COMMERCIAL

## 2024-06-18 DIAGNOSIS — R29.898 WEAKNESS OF SHOULDER: ICD-10-CM

## 2024-06-18 DIAGNOSIS — M25.611 DECREASED RIGHT SHOULDER RANGE OF MOTION: ICD-10-CM

## 2024-06-18 DIAGNOSIS — M25.511 ACUTE PAIN OF RIGHT SHOULDER: Primary | ICD-10-CM

## 2024-06-18 DIAGNOSIS — Z98.890 S/P ARTHROSCOPY OF RIGHT SHOULDER: ICD-10-CM

## 2024-06-18 DIAGNOSIS — Z47.89 ORTHOPEDIC AFTERCARE: ICD-10-CM

## 2024-06-18 NOTE — PROGRESS NOTES
Physical Therapy Treatment Note  [facility] [place]  [address]  [place] [address]    Patient: Jovany Taveras   : 1965  Referring practitioner: Wan Mccormick, *  Date of Initial Visit: Type: THERAPY  Noted: 2024  Today's Date: 2024  Patient seen for 24 sessions    Diagnosis/ICD-10 Codes:    ICD-10-CM ICD-9-CM   1. Acute pain of right shoulder  M25.511 719.41   2. Orthopedic aftercare  Z47.89 V54.9   3. S/P arthroscopy of right shoulder  Z98.890 V45.89   4. Weakness of shoulder  R29.898 719.61   5. Decreased right shoulder range of motion  M25.611 719.51         NEXT MD APPT: 2024    DATE 2024 2024 2024 2024 2024 2024 2024 2024 3/1/2024 3/8/2024   POST OP WEEK 0 1 2 3 4 5 6 7 8 9   DATE 3/15/2024 3/22/2024 3/29/2024 2024 2024 2024 2024 5/3/2024 5/10/2024 2024   POST OP WEEK 10 11 12 13 14 15 16 17 18 19   DATE 2024   POST OP WEEK 20 21 22 23 24 25 26 27 28 29     Post-op Week 23 as of 2024         Subjective       Jovany Taveras reports:  still has some pain at very upper end of range of motion which resolves quickly when arm lowered    Objective   See Exercise, Manual, and Modality Logs for complete treatment.     Continued  isometric shoulder strengthening; added scapular strengthening & stabilization per MD protocol.    R shoulder AA flex WFL     Flexion 175  Abd 175      Assessment/Plan  PROM and AAROM near normal limits and improved over last visit.  Good tolerance to strengthening & stabilization exercises.  Progressing well and in accordance with MD protocol    Assess response to today's interventions.  Assess response to home exercise program.      Continue therapy and progress in accordance with MD protocol.                Manual Therapy:         mins  20934;  Therapeutic Exercise:    20   mins  14334;      Neuromuscular Gilberto:    10    mins  80302;    Therapeutic Activity:     10     mins  80057;     Gait Training:           mins  46589;     Ultrasound:          mins  56978;    Electrical Stimulation:         mins  27472 ( );  Dry Needling          mins self-pay      Timed Treatment:   40 mins   Total Treatment:     40   mins     Maynor Redding PT  Physical Therapist

## 2024-06-20 ENCOUNTER — OFFICE VISIT (OUTPATIENT)
Dept: FAMILY MEDICINE CLINIC | Facility: CLINIC | Age: 59
End: 2024-06-20
Payer: COMMERCIAL

## 2024-06-20 VITALS
SYSTOLIC BLOOD PRESSURE: 126 MMHG | WEIGHT: 202.8 LBS | HEIGHT: 70 IN | DIASTOLIC BLOOD PRESSURE: 79 MMHG | RESPIRATION RATE: 16 BRPM | OXYGEN SATURATION: 98 % | BODY MASS INDEX: 29.03 KG/M2 | TEMPERATURE: 99.5 F | HEART RATE: 70 BPM

## 2024-06-20 DIAGNOSIS — E78.2 MIXED HYPERLIPIDEMIA: ICD-10-CM

## 2024-06-20 DIAGNOSIS — R93.1 AGATSTON CORONARY ARTERY CALCIUM SCORE LESS THAN 100: Primary | ICD-10-CM

## 2024-06-20 PROBLEM — R21 RASH: Status: RESOLVED | Noted: 2024-02-01 | Resolved: 2024-06-20

## 2024-06-20 PROBLEM — R05.2 SUBACUTE COUGH: Status: RESOLVED | Noted: 2024-04-04 | Resolved: 2024-06-20

## 2024-06-20 PROBLEM — L23.7 POISON IVY DERMATITIS: Status: RESOLVED | Noted: 2023-06-06 | Resolved: 2024-06-20

## 2024-06-20 PROBLEM — J06.9 ACUTE URI: Status: RESOLVED | Noted: 2022-11-01 | Resolved: 2024-06-20

## 2024-06-20 PROCEDURE — 99213 OFFICE O/P EST LOW 20 MIN: CPT | Performed by: FAMILY MEDICINE

## 2024-06-20 NOTE — PROGRESS NOTES
Subjective   Chief Complaint   Patient presents with    Results     Go Over The Coronary Artery Calcium Score    Hyperlipidemia     Jovany Taveras is a 59 y.o. male.     Patient Care Team:  Debra Frazier MD as PCP - General  Luiz Raymond Jr., MD as Consulting Physician (Dermatology)    History of Present Illness  He is coming in today to follow-up on his recent cardiovascular screening which included ultrasounds and coronary calcium score.  His coronary calcium score was elevated at 73.5 and LAD, his vascular screening showed mild buildup in the right carotid artery.  Patient's fasting lipid panel has been elevated with elevated total cholesterol and LDL.  Patient already started working on his diet and healthy lifestyle and he prefers not to be started on cholesterol medications as he has some concerns about some side effects and he has some safety concerns.       The following portions of the patient's history were reviewed and updated as appropriate: allergies, current medications, past family history, past medical history, past social history, past surgical history, and problem list.  Past Medical History:   Diagnosis Date    ADD (attention deficit disorder)     Allergic     Allergic rhinitis     Bulging lumbar disc     Chlamydia infection     Colon polyp 1989    Genital warts     Heart murmur 1979    Melanoma 02/2024    left calf    Osteoarthritis     Rotator cuff tear     Vitamin D deficiency      Past Surgical History:   Procedure Laterality Date    COLONOSCOPY  09/10/2018    polyps removed; 5 years repeat    SHOULDER ARTHROSCOPY W/ ROTATOR CUFF REPAIR Left 2/19/2021    Procedure: SHOULDER ARTHROSCOPY, BANKART REPAIR AND ROTATOR CUFF REPAIR;  Surgeon: Wan Mccormick MD;  Location: [facility];  Service: Orthopedics;  Laterality: Left;    SHOULDER ARTHROSCOPY W/ ROTATOR CUFF REPAIR Right 1/5/2024    Procedure: SHOULDER ARTHROSCOPY WITH ROTATOR CUFF REPAIR;  Surgeon: Wan Mccormick  "MD Romeo;  Location: [facility] MAIN OR;  Service: Orthopedics;  Laterality: Right;    SHOULDER SURGERY Left 02/19/2021    scope/ bankart repair    TONSILLECTOMY       The patient has a family history of  Family History   Problem Relation Age of Onset    Hypertension Father     Osteoarthritis Father     Osteoporosis Father      Social History     Socioeconomic History    Marital status:    Tobacco Use    Smoking status: Never     Passive exposure: Never    Smokeless tobacco: Never    Tobacco comments:     Never!   Vaping Use    Vaping status: Never Used   Substance and Sexual Activity    Alcohol use: Yes     Alcohol/week: 6.0 standard drinks of alcohol     Types: 6 Cans of beer per week    Drug use: No    Sexual activity: Yes     Partners: Female     Birth control/protection: I.U.D.       Review of Systems   Respiratory:  Negative for chest tightness and shortness of breath.    Cardiovascular:  Negative for chest pain and palpitations.     Visit Vitals  /79 (BP Location: Left arm, Patient Position: Sitting, Cuff Size: Large Adult)   Pulse 70   Temp 99.5 °F (37.5 °C) (Infrared)   Resp 16   Ht 177.8 cm (70\")   Wt 92 kg (202 lb 12.8 oz)   SpO2 98%   BMI 29.10 kg/m²              Current Outpatient Medications:     amphetamine-dextroamphetamine (ADDERALL) 20 MG tablet, Take 1 tablet by mouth 2 (Two) Times a Day., Disp: 60 tablet, Rfl: 0    betamethasone, augmented, (DIPROLENE) 0.05 % gel, Apply 1 application topically to the appropriate area as directed 2 (Two) Times a Day., Disp: 15 g, Rfl: 0    meloxicam (MOBIC) 15 MG tablet, TAKE 1 TABLET BY MOUTH DAILY, Disp: 30 tablet, Rfl: 2    metroNIDAZOLE (Metrogel) 1 % gel, Apply  topically to the appropriate area as directed Every Night., Disp: 55 g, Rfl: 0    pantoprazole (PROTONIX) 40 MG EC tablet, Take 1 tablet by mouth Daily., Disp: 90 tablet, Rfl: 0    Objective   Physical Exam  Constitutional:       General: He is not in acute distress.     Appearance: Normal " appearance. He is well-developed. He is not ill-appearing or diaphoretic.      Comments: Patient is in no distress, patient has normal voice and speech.  Normal respiratory effort.   HENT:      Head: Normocephalic and atraumatic.   Pulmonary:      Effort: Pulmonary effort is normal.   Musculoskeletal:      Cervical back: Normal range of motion and neck supple.   Neurological:      General: No focal deficit present.      Mental Status: He is alert and oriented to person, place, and time. Mental status is at baseline.   Psychiatric:         Mood and Affect: Mood normal.       Lipid Panel          7/17/2023    09:50 3/21/2024    11:32   Lipid Panel   Total Cholesterol 251  242    Triglycerides 75  96    HDL Cholesterol 73  67    VLDL Cholesterol 13  17    LDL Cholesterol  165  158    LDL/HDL Ratio 2.23  2.33        CT Cardiac Calcium Score Without Dye    Result Date: 6/10/2024  Impression: Impression: Mild plaque burden. Calcium Score Interpretation 0 -- Negative examination, no identifiable atherosclerotic plaque.  Very low risk of cardiac events in the next 5 years. 1-10 -- Minimal plaque burden.  Low risk of cardiac events in the next 5 years. 11- 100 -- Mild Plaque burden.  Mild risk of cardiac events in the next 5 years. 101 - 400 -- Moderate plaque burden.  Moderate risk of cardiac events in the next 5 years. Over 400 -- Extensive plaque burden.  High risk of cardiac events in the next 5 years. Electronically Signed: Anushka Salguero MD  6/10/2024 7:58 AM EDT  Workstation ID: [id number]     The 10-year ASCVD risk score (Curtis JIMENEZ, et al., 2019) is: 6.3%    Assessment & Plan   Diagnoses and all orders for this visit:    1. Agatston coronary artery calcium score less than 100 (Primary)  -     Cancel: Ambulatory Referral to Cardiology  -     Ambulatory Referral to Cardiology    2. Mixed hyperlipidemia  -     Cancel: Ambulatory Referral to Cardiology  -     Ambulatory Referral to Cardiology      I reviewed with the patient  his recent cardiovascular screening including vascular screening and coronary calcium score.  His vascular screening showed mild stenosis of the right internal carotid artery, otherwise it was negative.  His coronary calcium score was 73.5 in LAD.  I also advised the patient that considering his screening results statin medication is indicated to decrease cardiovascular risk in the future.  Patient however feels somehow uneasy about being started on statin medication as he is concerned about possible side effects and even some risks.  I will refer him to see a cardiologist to review these results and further opinion.      Return if symptoms worsen or fail to improve, for Recheck.    Requested Prescriptions      No prescriptions requested or ordered in this encounter       Debra Frazier MD  06/20/2024    Answers submitted by the patient for this visit:  Primary Reason for Visit (Submitted on 6/18/2024)  What is the primary reason for your visit?: Other  Other (Submitted on 6/18/2024)  Please describe your symptoms.: Coronary artery calcium score lab results  Have you had these symptoms before?: No  How long have you been having these symptoms?: Greater than 2 weeks

## 2024-06-24 DIAGNOSIS — F98.8 ATTENTION DEFICIT DISORDER (ADD) WITHOUT HYPERACTIVITY: ICD-10-CM

## 2024-06-24 RX ORDER — DEXTROAMPHETAMINE SACCHARATE, AMPHETAMINE ASPARTATE, DEXTROAMPHETAMINE SULFATE AND AMPHETAMINE SULFATE 5; 5; 5; 5 MG/1; MG/1; MG/1; MG/1
20 TABLET ORAL 2 TIMES DAILY
Qty: 60 TABLET | Refills: 0 | Status: SHIPPED | OUTPATIENT
Start: 2024-06-24

## 2024-06-25 ENCOUNTER — TREATMENT (OUTPATIENT)
Dept: PHYSICAL THERAPY | Facility: CLINIC | Age: 59
End: 2024-06-25
Payer: COMMERCIAL

## 2024-06-25 DIAGNOSIS — Z98.890 S/P ARTHROSCOPY OF RIGHT SHOULDER: ICD-10-CM

## 2024-06-25 DIAGNOSIS — M25.511 ACUTE PAIN OF RIGHT SHOULDER: Primary | ICD-10-CM

## 2024-06-25 DIAGNOSIS — Z47.89 ORTHOPEDIC AFTERCARE: ICD-10-CM

## 2024-06-25 DIAGNOSIS — R29.898 WEAKNESS OF SHOULDER: ICD-10-CM

## 2024-06-25 DIAGNOSIS — M25.611 DECREASED RIGHT SHOULDER RANGE OF MOTION: ICD-10-CM

## 2024-06-25 NOTE — PROGRESS NOTES
Re-Assessment / Progress Note  [facility] [place]  [address]  [place] [address]  Patient: Jovany Taveras   : 1965  Referring practitioner: Wan Mccormick, *  Date of Initial Visit: Episode Type: THERAPY  Noted: 2024    Today's Date: 2024  Patient seen for 25 sessions.    Diagnosis/ICD-10 Codes:    ICD-10-CM ICD-9-CM   1. Acute pain of right shoulder  M25.511 719.41   2. Orthopedic aftercare  Z47.89 V54.9   3. S/P arthroscopy of right shoulder  Z98.890 V45.89   4. Weakness of shoulder  R29.898 719.61   5. Decreased right shoulder range of motion  M25.611 719.51        Post-op Week 24 as of  2024    Subjective:     Subjective Questionnaire: QuickDASH: 18% impairment (80% at initial evaluation)  Subjective Questionnaire: QuickDASH (work) 0% impairment  Subjective Questionnaire: QuickDASH (sports - volleyball) 100% impairment  Clinical Progress: improved  Home Program Compliance: Yes  Treatment has included: therapeutic exercise, neuromuscular re-education, manual therapy, and cryotherapy    Subjective     Jovany Taveras reports: Has no shoulder pain at work.  Still has some pain at end range of motion abduction.  Doing well to otherwise.    Pain  Current pain ratin  At best pain ratin  At worst pain rating: 10  Location: right anterior shoulder  Quality: tightness.  Relieving factors: rest  Aggravating factors: overhead motion with forearm pronation and shoulder IR  Progression: improved      Objective     Passive Range of Motion      Right Shoulder   Flexion: 180 degrees   Abduction: 180 degrees   External rotation at 0 ABD:   Internal rotation behind the back T12 (T9 left)  Internal rotation 90°Abd: 60 deg    MMT:  Right Shoulder   Flexion: 5  Abduction: 5   External rotation 0°: 5  External rotation 90°: 5  Internal rotation 0°Abd:5  Internal rotation 90°Abd: 4+  Scaption: 5    Belly Press & Lift Off:  5/5 and negative for pain  Full and Empty can:  5/5 and  negative for pain    See Exercise, Manual, and Modality Logs for complete treatment      Assessment/Plan  Jovany Taveras is doing very well with his range of motion and reports shoulder is mostly pain free but has pain at times at end ranges of motion, especially when he pronates his forearm and internally rotates his shoulder.  Still limited somewhat with internal rotation.  Would benefit from continued PT  to work on strength and stabilization in overhead and restore shoulder IR..    Progress toward previous goals: Partially Met    Goals      Short-term goals (STG): 4/4  Long-term goals (LTG):  5/6      STG (6 weeks)  1) Independent in home program for basic shoulder range of motion (MET)   2) Demonstrates basic understanding of condition and their role in treatment progression (MET)   3) Tolerates initiation of shoulder strengthening  (MET)   4) Demonstrates slight improvement in tolerance to daily activities as evidenced by QuickDash score of 50% or less. (MET)         LTG (12 Weeks)  1) Independent in home program for self-management of his condition (NOT MET)   2) Demonstrates AROM  right shoulder flexion and abduction equal to 170 degrees or greater to allow for use of that upper extremity for reaching at or above head-height (MET)   3) Demonstrates AROM right shoulder internal and external rotation at 90 degrees abduction equal to 80 degrees or greater to allow for use of that upper extremity for dressing and grooming (MET)   4) Demonstrates significant improvement in his tolerance to daily activities as evidenced by QuickDash score of 20% or less. (MET)   5) MMT of right shoulder flexion/abduction/IR/ER equal to 5/5 to allow for ability to safely use that upper extremity for lifting small household and work objects. (MET)   6) Reports ability to return to work without difficulty or significant shoulder pain.  (MET)     Recommendations: Continue as planned  Frequency: 1 x per week   Timeframe: 4  weeks  Prognosis to achieve goals: good    PT Signature: Maynor Redding, PT    Manual Therapy:                 mins  95889;  Therapeutic Exercise:    20   mins  05718;     Neuromuscular Gilberto:    10    mins  32324;    Therapeutic Activity:      10     mins  21557;     Gait Training:                      mins  39640;     Ultrasound:                          mins  21711;    Electrical Stimulation:         mins  11625 ( );  Dry Needling                       mins self-pay        Timed Treatment:   40 mins   Total Treatment:     40   mins

## 2024-06-25 NOTE — LETTER
Re-Assessment / Progress Note  [facility] [place]  [address]  [place] [address]  Patient: Jovany Taveras   : 1965  Referring practitioner: Wan Mccormick, *  Date of Initial Visit: Episode Type: THERAPY  Noted: 2024    Today's Date: 2024  Patient seen for 25 sessions.    Diagnosis/ICD-10 Codes:    ICD-10-CM ICD-9-CM   1. Acute pain of right shoulder  M25.511 719.41   2. Orthopedic aftercare  Z47.89 V54.9   3. S/P arthroscopy of right shoulder  Z98.890 V45.89   4. Weakness of shoulder  R29.898 719.61   5. Decreased right shoulder range of motion  M25.611 719.51        Post-op Week 24 as of  2024    Subjective:     Subjective Questionnaire: QuickDASH: 18% impairment (80% at initial evaluation)  Subjective Questionnaire: QuickDASH (work) 0% impairment  Subjective Questionnaire: QuickDASH (sports - volleyball) 100% impairment  Clinical Progress: improved  Home Program Compliance: Yes  Treatment has included: therapeutic exercise, neuromuscular re-education, manual therapy, and cryotherapy    Subjective     Jovany Taveras reports: Has no shoulder pain at work.  Still has some pain at end range of motion abduction.  Doing well to otherwise.    Pain  Current pain ratin  At best pain ratin  At worst pain rating: 10  Location: right anterior shoulder  Quality: tightness.  Relieving factors: rest  Aggravating factors: overhead motion with forearm pronation and shoulder IR  Progression: improved      Objective     Passive Range of Motion      Right Shoulder   Flexion: 180 degrees   Abduction: 180 degrees   External rotation at 0 ABD:   Internal rotation behind the back T12 (T9 left)  Internal rotation 90°Abd: 60 deg    MMT:  Right Shoulder   Flexion: 5  Abduction: 5   External rotation 0°: 5  External rotation 90°: 5  Internal rotation 0°Abd:5  Internal rotation 90°Abd: 4+  Scaption: 5    Belly Press & Lift Off:  5/5 and negative for pain  Full and Empty can:  5/5 and  negative for pain    See Exercise, Manual, and Modality Logs for complete treatment      Assessment/Plan  Jovany Taveras is doing very well with his range of motion and reports shoulder is mostly pain free but has pain at times at end ranges of motion, especially when he pronates his forearm and internally rotates his shoulder.  Still limited somewhat with internal rotation.  Would benefit from continued PT  to work on strength and stabilization in overhead and restore shoulder IR..    Progress toward previous goals: Partially Met    Goals      Short-term goals (STG): 4/4  Long-term goals (LTG):  5/6      STG (6 weeks)  1) Independent in home program for basic shoulder range of motion (MET)   2) Demonstrates basic understanding of condition and their role in treatment progression (MET)   3) Tolerates initiation of shoulder strengthening  (MET)   4) Demonstrates slight improvement in tolerance to daily activities as evidenced by QuickDash score of 50% or less. (MET)         LTG (12 Weeks)  1) Independent in home program for self-management of his condition (NOT MET)   2) Demonstrates AROM  right shoulder flexion and abduction equal to 170 degrees or greater to allow for use of that upper extremity for reaching at or above head-height (MET)   3) Demonstrates AROM right shoulder internal and external rotation at 90 degrees abduction equal to 80 degrees or greater to allow for use of that upper extremity for dressing and grooming (MET)   4) Demonstrates significant improvement in his tolerance to daily activities as evidenced by QuickDash score of 20% or less. (MET)   5) MMT of right shoulder flexion/abduction/IR/ER equal to 5/5 to allow for ability to safely use that upper extremity for lifting small household and work objects. (MET)   6) Reports ability to return to work without difficulty or significant shoulder pain.  (MET)     Recommendations: Continue as planned  Frequency: 1 x per week   Timeframe: 4  weeks  Prognosis to achieve goals: good    PT Signature: Maynor Redding, PT    Thank you for allowing us to care for your patient!

## 2024-07-03 ENCOUNTER — TREATMENT (OUTPATIENT)
Dept: PHYSICAL THERAPY | Facility: CLINIC | Age: 59
End: 2024-07-03
Payer: COMMERCIAL

## 2024-07-03 DIAGNOSIS — M25.511 ACUTE PAIN OF RIGHT SHOULDER: Primary | ICD-10-CM

## 2024-07-03 DIAGNOSIS — R29.898 WEAKNESS OF SHOULDER: ICD-10-CM

## 2024-07-03 DIAGNOSIS — Z47.89 ORTHOPEDIC AFTERCARE: ICD-10-CM

## 2024-07-03 DIAGNOSIS — Z98.890 S/P ARTHROSCOPY OF RIGHT SHOULDER: ICD-10-CM

## 2024-07-03 DIAGNOSIS — M25.611 DECREASED RIGHT SHOULDER RANGE OF MOTION: ICD-10-CM

## 2024-07-03 NOTE — LETTER
Re-Assessment / Progress Note  [facility] [place]  [address]  [place] [address]  Patient: Jovany Taveras   : 1965  Referring practitioner: Wan Mccormick, *  Date of Initial Visit: Episode Type: THERAPY  Noted: 2024    Today's Date: 7/3/2024  Patient seen for 26 sessions.    Diagnosis/ICD-10 Codes:    ICD-10-CM ICD-9-CM   1. Acute pain of right shoulder  M25.511 719.41   2. Orthopedic aftercare  Z47.89 V54.9   3. S/P arthroscopy of right shoulder  Z98.890 V45.89   4. Weakness of shoulder  R29.898 719.61   5. Decreased right shoulder range of motion  M25.611 719.51        Post-op Week 24 as of  2024    Subjective:     Subjective Questionnaire: QuickDASH: 18% impairment (80% at initial evaluation)  Subjective Questionnaire: QuickDASH (work) 0% impairment  Subjective Questionnaire: QuickDASH (sports - volleyball) 100% impairment  Clinical Progress: improved  Home Program Compliance: Yes  Treatment has included: therapeutic exercise, neuromuscular re-education, manual therapy, and cryotherapy    Subjective     Jovany Taveras reports: Has no shoulder pain at work.  Still has some pain at end range of motion abduction.  Doing well otherwise.    Pain  Current pain ratin  At best pain ratin  At worst pain rating: 10  Location: right anterior shoulder  Quality: tightness.  Relieving factors: rest  Aggravating factors: overhead motion with forearm pronation and shoulder IR  Progression: improved      Objective     Passive Range of Motion      Right Shoulder   Flexion: 180 degrees   Abduction: 180 degrees   External rotation at 0 ABD:   Internal rotation behind the back T12 (T9 left)  Internal rotation 90°Abd: 60 deg    MMT:  Right Shoulder   Flexion: 5  Abduction: 5   External rotation 0°: 5  External rotation 90°: 5  Internal rotation 0°Abd:5  Internal rotation 90°Abd: 4+  Scaption: 5    Belly Press & Lift Off:  5/5 and negative for pain  Full and Empty can:  5/5 and  negative for pain    See Exercise, Manual, and Modality Logs for complete treatment      Assessment/Plan  Jovany Taveras is doing very well with his range of motion and reports shoulder is mostly pain free but has pain at times at end ranges of motion, especially when he pronates his forearm and internally rotates his shoulder.  Still limited somewhat with internal rotation.  Would benefit from continued PT  to work on strength and stabilization in overhead and restore shoulder IR primarily with home program with weekly updates and progression.    Progress toward previous goals: Partially Met    Goals      Short-term goals (STG): 4/4  Long-term goals (LTG):  5/6      STG (6 weeks)  1) Independent in home program for basic shoulder range of motion (MET)   2) Demonstrates basic understanding of condition and their role in treatment progression (MET)   3) Tolerates initiation of shoulder strengthening  (MET)   4) Demonstrates slight improvement in tolerance to daily activities as evidenced by QuickDash score of 50% or less. (MET)         LTG (12 Weeks)  1) Independent in home program for self-management of his condition (NOT MET)   2) Demonstrates AROM  right shoulder flexion and abduction equal to 170 degrees or greater to allow for use of that upper extremity for reaching at or above head-height (MET)   3) Demonstrates AROM right shoulder internal and external rotation at 90 degrees abduction equal to 80 degrees or greater to allow for use of that upper extremity for dressing and grooming (MET)   4) Demonstrates significant improvement in his tolerance to daily activities as evidenced by QuickDash score of 20% or less. (MET)   5) MMT of right shoulder flexion/abduction/IR/ER equal to 5/5 to allow for ability to safely use that upper extremity for lifting small household and work objects. (MET)   6) Reports ability to return to work without difficulty or significant shoulder pain.  (MET)     Recommendations: Continue  as planned  Frequency: 1 x per week   Timeframe: 4 weeks  Prognosis to achieve goals: good    PT Signature: Maynor Redding PT    Thank you for allowing us to care for your patient!

## 2024-07-03 NOTE — PROGRESS NOTES
Physical Therapy Treatment Note  [facility] [place]  [address]  [place] [address]     Patient: Jovany Taveras   : 1965  Referring practitioner: Wan Mccormick, *  Date of Initial Visit: Episode Type: THERAPY  Noted: 2024    Today's Date: 7/3/2024  Patient seen for 26 sessions.    Diagnosis/ICD-10 Codes:    ICD-10-CM ICD-9-CM   1. Acute pain of right shoulder  M25.511 719.41   2. Orthopedic aftercare  Z47.89 V54.9   3. S/P arthroscopy of right shoulder  Z98.890 V45.89   4. Weakness of shoulder  R29.898 719.61   5. Decreased right shoulder range of motion  M25.611 719.51        Post-op Week 25 as of  7/3/25    Subjective:       Subjective     Jovany Taveras reports: continues to note no shoulder pain at work.  Still has some pain at end range of motion abduction.      Objective     Passive Range of Motion      Right Shoulder   Flexion: 180 degrees   Abduction: 180 degrees   External rotation at 0 ABD:   Internal rotation behind the back T12 (T9 left)  Internal rotation 90°Abd: 60 deg    MMT:  Right Shoulder   Flexion: 5  Abduction: 5   External rotation 0°: 5  External rotation 90°: 5  Internal rotation 0°Abd:5  Internal rotation 90°Abd: 4+  Scaption: 5    Belly Press & Lift Off:  5/5 and negative for pain  Full and Empty can:  5/5 and negative for pain    See Exercise, Manual, and Modality Logs for complete treatment      Objective   See Exercise, Manual, and Modality Logs for complete treatment.      Continued  isometric shoulder strengthening; added scapular strengthening & stabilization per MD protocol.     R shoulder AA flex WFL      Flexion 175  Abd 175        Assessment/Plan  PROM and AAROM now at normal limits and improved over last visit.  Good tolerance to strengthening & stabilization exercises.  Progressing well and in accordance with MD protocol     Work toward independent home program.     Continue therapy and progress in accordance with MD protocol.         PT  Signature: Maynor [place] PT    Manual Therapy:                 mins  99460;  Therapeutic Exercise:    20   mins  44107;     Neuromuscular Gilberto:    10    mins  01871;    Therapeutic Activity:      10     mins  62505;     Gait Training:                      mins  96541;     Ultrasound:                          mins  62628;    Electrical Stimulation:         mins  16929 ( );  Dry Needling                       mins self-pay        Timed Treatment:   40 mins   Total Treatment:     40   mins

## 2024-07-09 ENCOUNTER — TREATMENT (OUTPATIENT)
Dept: PHYSICAL THERAPY | Facility: CLINIC | Age: 59
End: 2024-07-09
Payer: COMMERCIAL

## 2024-07-09 DIAGNOSIS — Z98.890 S/P ARTHROSCOPY OF RIGHT SHOULDER: ICD-10-CM

## 2024-07-09 DIAGNOSIS — R29.898 WEAKNESS OF SHOULDER: ICD-10-CM

## 2024-07-09 DIAGNOSIS — Z47.89 ORTHOPEDIC AFTERCARE: ICD-10-CM

## 2024-07-09 DIAGNOSIS — M25.511 ACUTE PAIN OF RIGHT SHOULDER: Primary | ICD-10-CM

## 2024-07-09 DIAGNOSIS — M25.611 DECREASED RIGHT SHOULDER RANGE OF MOTION: ICD-10-CM

## 2024-07-22 NOTE — PROGRESS NOTES
Physical Therapy Treatment Note  [facility] [place]  [address]  [place] [address]     Patient: Jovany Taveras   : 1965  Referring practitioner: Wan Mccormick, *  Date of Initial Visit: Episode Type: THERAPY  Noted: 2024    Today's Date: 2024  Patient seen for 27 sessions.    Diagnosis/ICD-10 Codes:    ICD-10-CM ICD-9-CM   1. Acute pain of right shoulder  M25.511 719.41   2. Orthopedic aftercare  Z47.89 V54.9   3. S/P arthroscopy of right shoulder  Z98.890 V45.89   4. Weakness of shoulder  R29.898 719.61   5. Decreased right shoulder range of motion  M25.611 719.51        Post-op Week 26 as of  25    Subjective:       Subjective     Jovany Taveras reports: continues to note no shoulder pain at work.  Still has some pain at end range of motion abduction.  Still some pain with IR near end range flexion and abduction.    Objective     Passive Range of Motion      Right Shoulder   Flexion: 180 degrees   Abduction: 180 degrees   External rotation at 0 ABD:   Internal rotation behind the back T12 (T9 left)  Internal rotation 90°Abd: 60 deg    MMT:  Right Shoulder   Flexion: 5  Abduction: 5   External rotation 0°: 5  External rotation 90°: 5  Internal rotation 0°Abd:5  Internal rotation 90°Abd: 4+  Scaption: 5    Belly Press & Lift Off:  5/5 and negative for pain  Full and Empty can:  5/5 and negative for pain    See Exercise, Manual, and Modality Logs for complete treatment      Objective   See Exercise, Manual, and Modality Logs for complete treatment.      Continued  isometric shoulder strengthening; added scapular strengthening & stabilization per MD protocol.     R shoulder AA flex WFL      Flexion 175  Abd 175        Assessment/Plan  PROM and AAROM now at normal limits and improved over last visit.  Good tolerance to strengthening & stabilization exercises.  Progressing well and in accordance with MD protocol     Work toward independent home program.     Continue  therapy and progress in accordance with MD protocol.         PT Signature: Maynor Redding PT    Manual Therapy:                 mins  17200;  Therapeutic Exercise:    20   mins  00015;     Neuromuscular Gilberto:    10    mins  39829;    Therapeutic Activity:      10     mins  75869;     Gait Training:                      mins  14932;     Ultrasound:                          mins  15237;    Electrical Stimulation:         mins  88236 ( );  Dry Needling                       mins self-pay        Timed Treatment:   40 mins   Total Treatment:     40   mins

## 2024-07-26 ENCOUNTER — OFFICE VISIT (OUTPATIENT)
Dept: CARDIOLOGY | Facility: CLINIC | Age: 59
End: 2024-07-26
Payer: COMMERCIAL

## 2024-07-26 VITALS
HEART RATE: 76 BPM | SYSTOLIC BLOOD PRESSURE: 120 MMHG | DIASTOLIC BLOOD PRESSURE: 80 MMHG | WEIGHT: 204 LBS | OXYGEN SATURATION: 97 % | HEIGHT: 70 IN | BODY MASS INDEX: 29.2 KG/M2

## 2024-07-26 DIAGNOSIS — E78.2 MIXED HYPERLIPIDEMIA: ICD-10-CM

## 2024-07-26 DIAGNOSIS — R93.1 ELEVATED CORONARY ARTERY CALCIUM SCORE: Primary | ICD-10-CM

## 2024-07-26 DIAGNOSIS — R06.02 SOB (SHORTNESS OF BREATH): ICD-10-CM

## 2024-07-26 DIAGNOSIS — Z13.6 SCREENING FOR CARDIOVASCULAR CONDITION: ICD-10-CM

## 2024-07-26 PROCEDURE — 93000 ELECTROCARDIOGRAM COMPLETE: CPT | Performed by: INTERNAL MEDICINE

## 2024-07-26 PROCEDURE — 99203 OFFICE O/P NEW LOW 30 MIN: CPT | Performed by: INTERNAL MEDICINE

## 2024-07-26 RX ORDER — SODIUM CHLORIDE 0.9 % (FLUSH) 0.9 %
10 SYRINGE (ML) INJECTION EVERY 12 HOURS SCHEDULED
OUTPATIENT
Start: 2024-07-26

## 2024-07-26 RX ORDER — SODIUM CHLORIDE 0.9 % (FLUSH) 0.9 %
10 SYRINGE (ML) INJECTION AS NEEDED
OUTPATIENT
Start: 2024-07-26

## 2024-07-26 RX ORDER — METOPROLOL TARTRATE 1 MG/ML
5 INJECTION, SOLUTION INTRAVENOUS
OUTPATIENT
Start: 2024-07-26

## 2024-07-26 RX ORDER — NITROGLYCERIN 0.4 MG/1
0.8 TABLET SUBLINGUAL
OUTPATIENT
Start: 2024-07-26

## 2024-07-26 RX ORDER — SODIUM CHLORIDE 9 MG/ML
40 INJECTION, SOLUTION INTRAVENOUS AS NEEDED
OUTPATIENT
Start: 2024-07-26

## 2024-07-26 RX ORDER — NITROGLYCERIN 0.4 MG/1
0.4 TABLET SUBLINGUAL
OUTPATIENT
Start: 2024-07-26 | End: 2024-07-26

## 2024-07-26 RX ORDER — METOPROLOL TARTRATE 50 MG/1
TABLET, FILM COATED ORAL
Qty: 2 TABLET | Refills: 0 | Status: SHIPPED | OUTPATIENT
Start: 2024-07-26

## 2024-07-26 RX ORDER — METOPROLOL TARTRATE 50 MG/1
50 TABLET, FILM COATED ORAL
OUTPATIENT
Start: 2024-07-26

## 2024-07-26 NOTE — PROGRESS NOTES
"Cardiology Office Visit      Encounter Date:  07/26/2024    Patient ID:   Jovany Taveras is a 59 y.o. male.    Reason For Followup:  Hyperlipidemia  Abnormal elevated calcium score    Brief Clinical History:  Dear Debra Valle MD    I had the pleasure of seeing Jovany Taveras today. As you are well aware, this is a 59 y.o. male past medical history that is significant for history of family history of coronary artery disease history of hyperlipidemia elevated calcium score presented for further evaluation and treatment options    Interval History:  Patient prefers not to take statin medication  Family history of coronary artery disease  Abnormal elevated calcium score  Nonspecific symptoms  No syncope  Good functional status      Assessment & Plan    Impressions:  Hyperlipidemia  Elevated calcium score  Family history of coronary artery disease      Recommendations:  Patient likely benefit from a statin medication for hyperlipidemia  Prior workup reviewed and discussed with patient  Patient prefers not to take a statin medication he wants to try the lifestyle changes  Diagnosis workup and treatment options prior available medical records reviewed and discussed with patient  Close monitoring of lipase  Plan to schedule patient for CT coronary angiogram for further risk stratification  Diagnosis and treatment options reviewed and discussed with patient  Continue close monitoring and follow-up  Follow-up in office in 3 months        Vitals:  Vitals:    07/26/24 1454   BP: 120/80   Pulse: 76   SpO2: 97%   Weight: 92.5 kg (204 lb)   Height: 177.8 cm (70\")       Physical Exam:    General: Alert, cooperative, no distress, appears stated age  Head:  Normocephalic, atraumatic, mucous membranes moist  Eyes:  Conjunctiva/corneas clear, EOM's intact     Neck:  Supple,  no adenopathy;      Lungs: Clear to auscultation bilaterally, no wheezes rhonchi rales are noted  Chest wall: No tenderness  Heart::  Regular rate and " rhythm, S1 and S2 normal, no murmur, rub or gallop  Abdomen: Soft, non-tender, nondistended bowel sounds active  Extremities: No cyanosis, clubbing, or edema  Pulses: 2+ and symmetric all extremities  Skin:  No rashes or lesions  Neuro/psych: A&O x3. CN II through XII are grossly intact with appropriate affect              Lab Results   Component Value Date    GLUCOSE 103 (H) 03/21/2024    BUN 24 (H) 03/21/2024    CREATININE 1.02 03/21/2024    EGFR 86.3 03/21/2024    BCR 23.5 03/21/2024    K 4.5 03/21/2024    CO2 23.5 03/21/2024    CALCIUM 9.6 03/21/2024    ALBUMIN 4.7 03/21/2024    BILITOT 0.5 03/21/2024    AST 19 03/21/2024    ALT 30 03/21/2024        No results found for this or any previous visit.     Lab Results   Component Value Date    CHOL 242 (H) 03/21/2024    TRIG 96 03/21/2024    HDL 67 (H) 03/21/2024     (H) 03/21/2024                Objective:          Allergies:  Allergies   Allergen Reactions    Penicillins Hives and Other (See Comments)    Pseudoephedrine Hives and Other (See Comments)       Medication Review:     Current Outpatient Medications:     amphetamine-dextroamphetamine (ADDERALL) 20 MG tablet, Take 1 tablet by mouth 2 (Two) Times a Day., Disp: 60 tablet, Rfl: 0    betamethasone, augmented, (DIPROLENE) 0.05 % gel, Apply 1 application topically to the appropriate area as directed 2 (Two) Times a Day., Disp: 15 g, Rfl: 0    meloxicam (MOBIC) 15 MG tablet, TAKE 1 TABLET BY MOUTH DAILY, Disp: 30 tablet, Rfl: 2    metroNIDAZOLE (Metrogel) 1 % gel, Apply  topically to the appropriate area as directed Every Night., Disp: 55 g, Rfl: 0    pantoprazole (PROTONIX) 40 MG EC tablet, Take 1 tablet by mouth Daily., Disp: 90 tablet, Rfl: 0    metoprolol tartrate (LOPRESSOR) 50 MG tablet, Take 50 mg at Bedtime the Night Before Coronary CTA Appointment and In the Morning 1 Hour Prior to Coronary CTA Appointment, Disp: 2 tablet, Rfl: 0    Family History:  Family History   Problem Relation Age of Onset     Hypertension Father     Osteoarthritis Father     Osteoporosis Father     Heart disease Father     Heart disease Mother        Past Medical History:  Past Medical History:   Diagnosis Date    ADD (attention deficit disorder)     Allergic     Allergic rhinitis     Bulging lumbar disc     Chlamydia infection     Colon polyp 1989    Genital warts     Heart murmur 1979    Hyperlipidemia     Melanoma 02/2024    left calf    Osteoarthritis     Rotator cuff tear     Vitamin D deficiency        Past surgical History:  Past Surgical History:   Procedure Laterality Date    COLONOSCOPY  09/10/2018    polyps removed; 5 years repeat    SHOULDER ARTHROSCOPY W/ ROTATOR CUFF REPAIR Left 02/19/2021    Procedure: SHOULDER ARTHROSCOPY, BANKART REPAIR AND ROTATOR CUFF REPAIR;  Surgeon: Wan Mccormick MD;  Location: [facility] MAIN OR;  Service: Orthopedics;  Laterality: Left;    SHOULDER ARTHROSCOPY W/ ROTATOR CUFF REPAIR Right 01/05/2024    Procedure: SHOULDER ARTHROSCOPY WITH ROTATOR CUFF REPAIR;  Surgeon: Wan Mccormick MD;  Location: [facility] MAIN OR;  Service: Orthopedics;  Laterality: Right;    SHOULDER SURGERY Left 02/19/2021    scope/ bankart repair    TONSILLECTOMY         Social History:  Social History     Socioeconomic History    Marital status:    Tobacco Use    Smoking status: Never     Passive exposure: Never    Smokeless tobacco: Never    Tobacco comments:     Never!   Vaping Use    Vaping status: Never Used   Substance and Sexual Activity    Alcohol use: Yes     Alcohol/week: 6.0 standard drinks of alcohol     Types: 6 Cans of beer per week    Drug use: No    Sexual activity: Yes     Partners: Female     Birth control/protection: I.U.D.       Review of Systems:  The following systems were reviewed as they relate to the cardiovascular system: Constitutional, Eyes, ENT, Cardiovascular, Respiratory, Gastrointestinal, Integumentary, Neurological, Psychiatric, Hematologic, Endocrine, Musculoskeletal,  and Genitourinary. The pertinent cardiovascular findings are reported above with all other cardiovascular points within those systems being negative.    Diagnostic Study Review:     Current Electrocardiogram:    ECG 12 Lead    Date/Time: 7/26/2024 3:33 PM  Performed by: Wesley Howell MD    Authorized by: Wesley Howell MD  Comparison: compared with previous ECG   Similar to previous ECG  Rhythm: sinus rhythm  Rate: normal  BPM: 76  Conduction: conduction normal  QRS axis: normal  Other findings: non-specific ST-T wave changes    Clinical impression: abnormal EKG                NOTE: The following portions of the patient's history were reviewed and updated this visit as appropriate: allergies, current medications, past family history, past medical history, past social history, past surgical history and problem list.

## 2024-07-27 DIAGNOSIS — R06.02 SOB (SHORTNESS OF BREATH): ICD-10-CM

## 2024-07-27 DIAGNOSIS — R93.1 ELEVATED CORONARY ARTERY CALCIUM SCORE: ICD-10-CM

## 2024-07-27 DIAGNOSIS — Z13.6 SCREENING FOR CARDIOVASCULAR CONDITION: ICD-10-CM

## 2024-07-29 ENCOUNTER — PATIENT ROUNDING (BHMG ONLY) (OUTPATIENT)
Dept: CARDIOLOGY | Facility: CLINIC | Age: 59
End: 2024-07-29
Payer: COMMERCIAL

## 2024-07-29 RX ORDER — METOPROLOL TARTRATE 50 MG/1
TABLET, FILM COATED ORAL
Qty: 2 TABLET | Refills: 0 | OUTPATIENT
Start: 2024-07-29

## 2024-07-31 DIAGNOSIS — F98.8 ATTENTION DEFICIT DISORDER (ADD) WITHOUT HYPERACTIVITY: ICD-10-CM

## 2024-07-31 RX ORDER — DEXTROAMPHETAMINE SACCHARATE, AMPHETAMINE ASPARTATE, DEXTROAMPHETAMINE SULFATE AND AMPHETAMINE SULFATE 5; 5; 5; 5 MG/1; MG/1; MG/1; MG/1
20 TABLET ORAL 2 TIMES DAILY
Qty: 60 TABLET | Refills: 0 | Status: SHIPPED | OUTPATIENT
Start: 2024-07-31

## 2024-08-05 ENCOUNTER — OFFICE VISIT (OUTPATIENT)
Dept: FAMILY MEDICINE CLINIC | Facility: CLINIC | Age: 59
End: 2024-08-05
Payer: COMMERCIAL

## 2024-08-05 VITALS
TEMPERATURE: 97.5 F | HEART RATE: 58 BPM | OXYGEN SATURATION: 95 % | DIASTOLIC BLOOD PRESSURE: 68 MMHG | RESPIRATION RATE: 16 BRPM | WEIGHT: 203 LBS | SYSTOLIC BLOOD PRESSURE: 114 MMHG | HEIGHT: 70 IN | BODY MASS INDEX: 29.06 KG/M2

## 2024-08-05 DIAGNOSIS — S09.90XA TRAUMATIC INJURY OF HEAD, INITIAL ENCOUNTER: ICD-10-CM

## 2024-08-05 DIAGNOSIS — G44.52 NEW DAILY PERSISTENT HEADACHE: Primary | ICD-10-CM

## 2024-08-05 PROCEDURE — 99213 OFFICE O/P EST LOW 20 MIN: CPT | Performed by: FAMILY MEDICINE

## 2024-08-05 RX ORDER — MELOXICAM 15 MG/1
15 TABLET ORAL DAILY
Qty: 30 TABLET | Refills: 2 | Status: SHIPPED | OUTPATIENT
Start: 2024-08-05

## 2024-08-05 RX ORDER — METHYLPREDNISOLONE 4 MG/1
TABLET ORAL
Qty: 1 EACH | Refills: 0 | Status: SHIPPED | OUTPATIENT
Start: 2024-08-05

## 2024-08-05 NOTE — PROGRESS NOTES
Subjective   Chief Complaint   Patient presents with    Headache     Jovany Taveras is a 59 y.o. male.     Patient Care Team:  Debra Frazier MD as PCP - General  Luiz Raymond Jr., MD as Consulting Physician (Dermatology)    History of Present Illness  Is coming in today due to the headache which he has been experiencing over the last 3 weeks.  Patient reports that 3 weeks ago while at work he bumped the right side of his head against the metal piece.  It was not initially terrible bump but it caused some mild discomfort in the area, the next day he started with a headache which is located behind the right eye and radiating to the right side of the forehead.  The headache is still present and gets worse with the movement of the head or when he turns his head left and right or when he bends over.  No nausea, vomiting, or visual issues are being reported.  He wonders if his symptoms or other can be due to sinus issues or allergies problems, but he does not have any upper respiratory symptoms.       The following portions of the patient's history were reviewed and updated as appropriate: allergies, current medications, past family history, past medical history, past social history, past surgical history, and problem list.  Past Medical History:   Diagnosis Date    ADD (attention deficit disorder)     Allergic     Allergic rhinitis     Bulging lumbar disc     Chlamydia infection     Colon polyp 1989    Genital warts     Heart murmur 1979    Hyperlipidemia     Melanoma 02/2024    left calf    Osteoarthritis     Rotator cuff tear     Vitamin D deficiency      Past Surgical History:   Procedure Laterality Date    COLONOSCOPY  09/10/2018    polyps removed; 5 years repeat    SHOULDER ARTHROSCOPY W/ ROTATOR CUFF REPAIR Left 02/19/2021    Procedure: SHOULDER ARTHROSCOPY, BANKART REPAIR AND ROTATOR CUFF REPAIR;  Surgeon: Wan Mccormick MD;  Location: [facility] OR;  Service: Orthopedics;  Laterality: Left;     "SHOULDER ARTHROSCOPY W/ ROTATOR CUFF REPAIR Right 01/05/2024    Procedure: SHOULDER ARTHROSCOPY WITH ROTATOR CUFF REPAIR;  Surgeon: Wan Mccormick MD;  Location: [facility] MAIN OR;  Service: Orthopedics;  Laterality: Right;    SHOULDER SURGERY Left 02/19/2021    scope/ bankart repair    TONSILLECTOMY       The patient has a family history of  Family History   Problem Relation Age of Onset    Hypertension Father     Osteoarthritis Father     Osteoporosis Father     Heart disease Father     Heart disease Mother      Social History     Socioeconomic History    Marital status:    Tobacco Use    Smoking status: Never     Passive exposure: Never    Smokeless tobacco: Never    Tobacco comments:     Never!   Vaping Use    Vaping status: Never Used   Substance and Sexual Activity    Alcohol use: Yes     Alcohol/week: 6.0 standard drinks of alcohol     Types: 6 Cans of beer per week    Drug use: No    Sexual activity: Yes     Partners: Female     Birth control/protection: I.U.D.       Review of Systems   Constitutional:  Negative for activity change, fatigue and fever.   Eyes:  Negative for blurred vision.   Gastrointestinal:  Negative for nausea and vomiting.   Skin:  Negative for rash.   Neurological:  Positive for headache. Negative for tremors.     Visit Vitals  /68 (BP Location: Left arm, Patient Position: Sitting, Cuff Size: Adult)   Pulse 58   Temp 97.5 °F (36.4 °C) (Infrared)   Resp 16   Ht 177.8 cm (70\")   Wt 92.1 kg (203 lb)   SpO2 95%   BMI 29.13 kg/m²              Current Outpatient Medications:     amphetamine-dextroamphetamine (ADDERALL) 20 MG tablet, Take 1 tablet by mouth 2 (Two) Times a Day., Disp: 60 tablet, Rfl: 0    betamethasone, augmented, (DIPROLENE) 0.05 % gel, Apply 1 application topically to the appropriate area as directed 2 (Two) Times a Day., Disp: 15 g, Rfl: 0    meloxicam (MOBIC) 15 MG tablet, TAKE 1 TABLET BY MOUTH DAILY, Disp: 30 tablet, Rfl: 2    metoprolol tartrate " (LOPRESSOR) 50 MG tablet, Take 50 mg at Bedtime the Night Before Coronary CTA Appointment and In the Morning 1 Hour Prior to Coronary CTA Appointment, Disp: 2 tablet, Rfl: 0    metroNIDAZOLE (Metrogel) 1 % gel, Apply  topically to the appropriate area as directed Every Night., Disp: 55 g, Rfl: 0    pantoprazole (PROTONIX) 40 MG EC tablet, Take 1 tablet by mouth Daily., Disp: 90 tablet, Rfl: 0    methylPREDNISolone (Medrol) 4 MG dose pack, Take as directed on package instructions., Disp: 1 each, Rfl: 0    Objective   Physical Exam  Constitutional:       General: He is not in acute distress.     Appearance: Normal appearance. He is well-developed. He is not ill-appearing or diaphoretic.      Comments: Patient is in no distress, patient has normal voice and speech.  Normal respiratory effort.   HENT:      Head: Normocephalic and atraumatic.   Pulmonary:      Effort: Pulmonary effort is normal.   Musculoskeletal:      Cervical back: Normal range of motion and neck supple.   Neurological:      General: No focal deficit present.      Mental Status: He is alert and oriented to person, place, and time. Mental status is at baseline.      Cranial Nerves: No cranial nerve deficit.      Sensory: No sensory deficit.      Motor: No weakness.      Coordination: Coordination normal.      Gait: Gait normal.      Deep Tendon Reflexes: Reflexes normal.   Psychiatric:         Mood and Affect: Mood normal.       Assessment & Plan   Diagnoses and all orders for this visit:    1. New daily persistent headache (Primary)  -     CT Head Without Contrast; Future  -     methylPREDNISolone (Medrol) 4 MG dose pack; Take as directed on package instructions.  Dispense: 1 each; Refill: 0    2. Traumatic injury of head, initial encounter  -     CT Head Without Contrast; Future  -     methylPREDNISolone (Medrol) 4 MG dose pack; Take as directed on package instructions.  Dispense: 1 each; Refill: 0      Patient presents today with a 3-week history of  daily headaches which started after he had the blunt head injury.  His neurological exam today is intact.  I will be starting him on steroid pack and considering the head trauma I will also be getting brain imaging.  He will monitor the symptoms and contact us back if any concerns.        Return if symptoms worsen or fail to improve, for Recheck.    Requested Prescriptions     Signed Prescriptions Disp Refills    methylPREDNISolone (Medrol) 4 MG dose pack 1 each 0     Sig: Take as directed on package instructions.       Debra Frazier MD  08/05/2024

## 2024-08-09 ENCOUNTER — HOSPITAL ENCOUNTER (OUTPATIENT)
Dept: CT IMAGING | Facility: HOSPITAL | Age: 59
Discharge: HOME | End: 2024-08-09
Admitting: FAMILY MEDICINE
Payer: COMMERCIAL

## 2024-08-09 DIAGNOSIS — S09.90XA TRAUMATIC INJURY OF HEAD, INITIAL ENCOUNTER: ICD-10-CM

## 2024-08-09 DIAGNOSIS — G44.52 NEW DAILY PERSISTENT HEADACHE: ICD-10-CM

## 2024-08-09 PROCEDURE — 70450 CT HEAD/BRAIN W/O DYE: CPT

## 2024-09-03 DIAGNOSIS — F98.8 ATTENTION DEFICIT DISORDER (ADD) WITHOUT HYPERACTIVITY: ICD-10-CM

## 2024-09-03 DIAGNOSIS — K21.9 GASTROESOPHAGEAL REFLUX DISEASE, UNSPECIFIED WHETHER ESOPHAGITIS PRESENT: ICD-10-CM

## 2024-09-03 RX ORDER — PANTOPRAZOLE SODIUM 40 MG/1
40 TABLET, DELAYED RELEASE ORAL DAILY
Qty: 90 TABLET | Refills: 0 | Status: SHIPPED | OUTPATIENT
Start: 2024-09-03

## 2024-09-03 RX ORDER — DEXTROAMPHETAMINE SACCHARATE, AMPHETAMINE ASPARTATE, DEXTROAMPHETAMINE SULFATE AND AMPHETAMINE SULFATE 5; 5; 5; 5 MG/1; MG/1; MG/1; MG/1
20 TABLET ORAL 2 TIMES DAILY
Qty: 60 TABLET | Refills: 0 | Status: SHIPPED | OUTPATIENT
Start: 2024-09-03

## 2024-09-03 RX ORDER — MELOXICAM 15 MG/1
15 TABLET ORAL DAILY
Qty: 30 TABLET | Refills: 2 | Status: SHIPPED | OUTPATIENT
Start: 2024-09-03

## 2024-09-12 ENCOUNTER — HOSPITAL ENCOUNTER (OUTPATIENT)
Dept: CT IMAGING | Facility: HOSPITAL | Age: 59
Discharge: HOME | End: 2024-09-12
Payer: COMMERCIAL

## 2024-09-12 DIAGNOSIS — R06.02 SOB (SHORTNESS OF BREATH): ICD-10-CM

## 2024-09-12 DIAGNOSIS — Z13.6 SCREENING FOR CARDIOVASCULAR CONDITION: ICD-10-CM

## 2024-09-12 DIAGNOSIS — R93.1 ELEVATED CORONARY ARTERY CALCIUM SCORE: ICD-10-CM

## 2024-09-12 LAB
CREAT BLDA-MCNC: 1.1 MG/DL (ref 0.6–1.3)
EGFRCR SERPLBLD CKD-EPI 2021: 78.3 ML/MIN/1.73

## 2024-09-12 PROCEDURE — 75574 CT ANGIO HRT W/3D IMAGE: CPT

## 2024-09-12 PROCEDURE — 82565 ASSAY OF CREATININE: CPT

## 2024-09-12 PROCEDURE — 25510000001 IOPAMIDOL PER 1 ML: Performed by: INTERNAL MEDICINE

## 2024-09-12 RX ORDER — METOPROLOL TARTRATE 1 MG/ML
5 INJECTION, SOLUTION INTRAVENOUS
Status: DISCONTINUED | OUTPATIENT
Start: 2024-09-12 | End: 2024-09-13 | Stop reason: HOSPADM

## 2024-09-12 RX ORDER — METOPROLOL TARTRATE 25 MG/1
25 TABLET, FILM COATED ORAL ONCE
Status: DISCONTINUED | OUTPATIENT
Start: 2024-09-12 | End: 2024-09-13 | Stop reason: HOSPADM

## 2024-09-12 RX ORDER — IVABRADINE 5 MG/1
15 TABLET, FILM COATED ORAL ONCE
Status: DISCONTINUED | OUTPATIENT
Start: 2024-09-12 | End: 2024-09-13 | Stop reason: HOSPADM

## 2024-09-12 RX ORDER — NITROGLYCERIN 0.4 MG/1
0.8 TABLET SUBLINGUAL
Status: COMPLETED | OUTPATIENT
Start: 2024-09-12 | End: 2024-09-12

## 2024-09-12 RX ORDER — METOPROLOL TARTRATE 50 MG
100 TABLET ORAL ONCE
Status: DISCONTINUED | OUTPATIENT
Start: 2024-09-12 | End: 2024-09-13 | Stop reason: HOSPADM

## 2024-09-12 RX ORDER — METOPROLOL TARTRATE 50 MG
50 TABLET ORAL
Status: DISCONTINUED | OUTPATIENT
Start: 2024-09-12 | End: 2024-09-13 | Stop reason: HOSPADM

## 2024-09-12 RX ORDER — METOPROLOL TARTRATE 50 MG
150 TABLET ORAL ONCE
Status: DISCONTINUED | OUTPATIENT
Start: 2024-09-12 | End: 2024-09-13 | Stop reason: HOSPADM

## 2024-09-12 RX ORDER — SODIUM CHLORIDE 0.9 % (FLUSH) 0.9 %
10 SYRINGE (ML) INJECTION AS NEEDED
Status: DISCONTINUED | OUTPATIENT
Start: 2024-09-12 | End: 2024-09-13 | Stop reason: HOSPADM

## 2024-09-12 RX ORDER — SODIUM CHLORIDE 9 MG/ML
40 INJECTION, SOLUTION INTRAVENOUS AS NEEDED
Status: DISCONTINUED | OUTPATIENT
Start: 2024-09-12 | End: 2024-09-13 | Stop reason: HOSPADM

## 2024-09-12 RX ORDER — METOPROLOL TARTRATE 50 MG
200 TABLET ORAL ONCE
Status: DISCONTINUED | OUTPATIENT
Start: 2024-09-12 | End: 2024-09-13 | Stop reason: HOSPADM

## 2024-09-12 RX ORDER — NITROGLYCERIN 0.4 MG/1
TABLET SUBLINGUAL
Status: DISPENSED
Start: 2024-09-12 | End: 2024-09-12

## 2024-09-12 RX ORDER — METOPROLOL TARTRATE 50 MG
50 TABLET ORAL ONCE
Status: DISCONTINUED | OUTPATIENT
Start: 2024-09-12 | End: 2024-09-13 | Stop reason: HOSPADM

## 2024-09-12 RX ORDER — IOPAMIDOL 755 MG/ML
100 INJECTION, SOLUTION INTRAVASCULAR
Status: COMPLETED | OUTPATIENT
Start: 2024-09-12 | End: 2024-09-12

## 2024-09-12 RX ORDER — SODIUM CHLORIDE 0.9 % (FLUSH) 0.9 %
10 SYRINGE (ML) INJECTION EVERY 12 HOURS SCHEDULED
Status: DISCONTINUED | OUTPATIENT
Start: 2024-09-12 | End: 2024-09-13 | Stop reason: HOSPADM

## 2024-09-12 RX ORDER — NITROGLYCERIN 0.4 MG/1
0.4 TABLET SUBLINGUAL
Status: COMPLETED | OUTPATIENT
Start: 2024-09-12 | End: 2024-09-12

## 2024-09-12 RX ADMIN — IOPAMIDOL 100 ML: 755 INJECTION, SOLUTION INTRAVENOUS at 10:26

## 2024-09-12 RX ADMIN — NITROGLYCERIN 0.8 MG: 0.4 TABLET SUBLINGUAL at 10:15

## 2024-09-12 NOTE — NURSING NOTE
0938 Arrived in IR pre/post  0942 47, 9, 107/53 (71), 97%  0950 #18 Angiocath placed in RAC-Creat sent  1000 To CT  1010 0.8 mg Nitroglycerin given SL  1020 Back from Ct.  Pt tolerated procedure without incidence  1025 46, 14, 91/48, 98%  1028 IV dc'd  1030 Pt discharged home with instructions to increase fluid intake over the next 24 hours and follow up with Dr. Howell.

## 2024-09-25 ENCOUNTER — OFFICE VISIT (OUTPATIENT)
Dept: FAMILY MEDICINE CLINIC | Facility: CLINIC | Age: 59
End: 2024-09-25
Payer: COMMERCIAL

## 2024-09-25 VITALS
DIASTOLIC BLOOD PRESSURE: 83 MMHG | HEIGHT: 70 IN | WEIGHT: 206 LBS | TEMPERATURE: 98.7 F | RESPIRATION RATE: 14 BRPM | HEART RATE: 59 BPM | BODY MASS INDEX: 29.49 KG/M2 | OXYGEN SATURATION: 99 % | SYSTOLIC BLOOD PRESSURE: 132 MMHG

## 2024-09-25 DIAGNOSIS — R93.1 AGATSTON CORONARY ARTERY CALCIUM SCORE BETWEEN 200 AND 399: ICD-10-CM

## 2024-09-25 DIAGNOSIS — L30.9 DERMATITIS: ICD-10-CM

## 2024-09-25 DIAGNOSIS — E78.2 MIXED HYPERLIPIDEMIA: Primary | ICD-10-CM

## 2024-09-25 DIAGNOSIS — K21.9 GASTROESOPHAGEAL REFLUX DISEASE, UNSPECIFIED WHETHER ESOPHAGITIS PRESENT: ICD-10-CM

## 2024-09-25 PROBLEM — S09.90XA HEAD TRAUMA: Status: RESOLVED | Noted: 2024-08-05 | Resolved: 2024-09-25

## 2024-09-25 PROBLEM — G44.52 NEW DAILY PERSISTENT HEADACHE: Status: RESOLVED | Noted: 2024-08-05 | Resolved: 2024-09-25

## 2024-09-25 PROCEDURE — 99214 OFFICE O/P EST MOD 30 MIN: CPT | Performed by: FAMILY MEDICINE

## 2024-09-25 RX ORDER — PANTOPRAZOLE SODIUM 40 MG/1
40 TABLET, DELAYED RELEASE ORAL DAILY
Qty: 90 TABLET | Refills: 0 | Status: SHIPPED | OUTPATIENT
Start: 2024-09-25

## 2024-09-25 RX ORDER — ROSUVASTATIN CALCIUM 20 MG/1
20 TABLET, COATED ORAL DAILY
Qty: 90 TABLET | Refills: 1 | Status: SHIPPED | OUTPATIENT
Start: 2024-09-25

## 2024-09-25 RX ORDER — SULFAMETHOXAZOLE/TRIMETHOPRIM 800-160 MG
1 TABLET ORAL 2 TIMES DAILY
Qty: 20 TABLET | Refills: 0 | Status: SHIPPED | OUTPATIENT
Start: 2024-09-25 | End: 2024-10-05

## 2024-10-18 ENCOUNTER — OFFICE VISIT (OUTPATIENT)
Dept: CARDIOLOGY | Facility: CLINIC | Age: 59
End: 2024-10-18
Payer: COMMERCIAL

## 2024-10-18 VITALS
OXYGEN SATURATION: 100 % | BODY MASS INDEX: 30.32 KG/M2 | SYSTOLIC BLOOD PRESSURE: 116 MMHG | HEART RATE: 64 BPM | DIASTOLIC BLOOD PRESSURE: 75 MMHG | HEIGHT: 70 IN | WEIGHT: 211.8 LBS

## 2024-10-18 DIAGNOSIS — R93.1 ELEVATED CORONARY ARTERY CALCIUM SCORE: ICD-10-CM

## 2024-10-18 DIAGNOSIS — R93.1 AGATSTON CORONARY ARTERY CALCIUM SCORE BETWEEN 200 AND 399: ICD-10-CM

## 2024-10-18 DIAGNOSIS — E78.2 MIXED HYPERLIPIDEMIA: Primary | ICD-10-CM

## 2024-10-18 DIAGNOSIS — R73.9 HYPERGLYCEMIA: ICD-10-CM

## 2024-10-18 PROCEDURE — 99214 OFFICE O/P EST MOD 30 MIN: CPT | Performed by: INTERNAL MEDICINE

## 2024-10-18 NOTE — PROGRESS NOTES
Cardiology Office Visit      Encounter Date:  10/18/2024    Patient ID:   Jovany Taveras is a 59 y.o. male.    Reason For Followup:  Hyperlipidemia  Abnormal elevated calcium score  Coronary artery disease    Brief Clinical History:  Dear Debra Valle MD    I had the pleasure of seeing Jovany Taveras today. As you are well aware, this is a 59 y.o. male past medical history that is significant for history of family history of coronary artery disease history of hyperlipidemia elevated calcium score presented for further evaluation and treatment options    Interval History:  Patient prefers not to take statin medication  Family history of coronary artery disease  Abnormal elevated calcium score  Nonspecific symptoms  No syncope  Good functional status      Assessment & Plan    Impressions:  Hyperlipidemia  Elevated calcium score 200  Family history of coronary artery disease  . Total calcium score 215 .   2.  Right dominant coronary system.  Dense calcified plaque in the proximal LAD resulting in likely moderate (50-69%) luminal stenosis within limits of calcium blooming artifact.  CAD-RADS 3/P2.   3. LV normal in size. LVEF 68% as estimated by cine looping of all available phases.  No obvious wall motion abnormalities.         Recommendations:  Patient likely benefit from a statin medication for hyperlipidemia  Prior workup reviewed and discussed with patient  Patient prefers not to take a statin medication he wants to try the lifestyle changes  Diagnosis workup and treatment options prior available medical records reviewed and discussed with patient  Close monitoring of lipase  CT coronary angiogram findings reviewed and discussed with patient  Continued aggressive risk factor modification  I agree with the starting patient on high-dose statin Crestor 20 mg p.o. once a day  Recheck lipids in 3 to 6 months  Diagnosis and treatment options reviewed and discussed with patient  Continue close monitoring and  "follow-up  Follow-up in office in 6 months        Vitals:  Vitals:    10/18/24 1236   BP: 116/75   Pulse: 64   SpO2: 100%   Weight: 96.1 kg (211 lb 12.8 oz)   Height: 177.8 cm (70\")       Physical Exam:    General: Alert, cooperative, no distress, appears stated age  Head:  Normocephalic, atraumatic, mucous membranes moist  Eyes:  Conjunctiva/corneas clear, EOM's intact     Neck:  Supple,  no adenopathy;      Lungs: Clear to auscultation bilaterally, no wheezes rhonchi rales are noted  Chest wall: No tenderness  Heart::  Regular rate and rhythm, S1 and S2 normal, no murmur, rub or gallop  Abdomen: Soft, non-tender, nondistended bowel sounds active  Extremities: No cyanosis, clubbing, or edema  Pulses: 2+ and symmetric all extremities  Skin:  No rashes or lesions  Neuro/psych: A&O x3. CN II through XII are grossly intact with appropriate affect              Lab Results   Component Value Date    GLUCOSE 103 (H) 03/21/2024    BUN 24 (H) 03/21/2024    CREATININE 1.10 09/12/2024    EGFR 78.3 09/12/2024    BCR 23.5 03/21/2024    K 4.5 03/21/2024    CO2 23.5 03/21/2024    CALCIUM 9.6 03/21/2024    ALBUMIN 4.7 03/21/2024    BILITOT 0.5 03/21/2024    AST 19 03/21/2024    ALT 30 03/21/2024        No results found for this or any previous visit.     Lab Results   Component Value Date    CHOL 242 (H) 03/21/2024    TRIG 96 03/21/2024    HDL 67 (H) 03/21/2024     (H) 03/21/2024                Objective:          Allergies:  Allergies   Allergen Reactions    Penicillins Hives and Other (See Comments)    Pseudoephedrine Hives and Other (See Comments)       Medication Review:     Current Outpatient Medications:     amphetamine-dextroamphetamine (ADDERALL) 20 MG tablet, Take 1 tablet by mouth 2 (Two) Times a Day., Disp: 60 tablet, Rfl: 0    betamethasone, augmented, (DIPROLENE) 0.05 % gel, Apply 1 application topically to the appropriate area as directed 2 (Two) Times a Day., Disp: 15 g, Rfl: 0    meloxicam (MOBIC) 15 MG " tablet, Take 1 tablet by mouth Daily., Disp: 30 tablet, Rfl: 2    methylPREDNISolone (Medrol) 4 MG dose pack, Take as directed on package instructions., Disp: 1 each, Rfl: 0    metoprolol tartrate (LOPRESSOR) 50 MG tablet, Take 50 mg at Bedtime the Night Before Coronary CTA Appointment and In the Morning 1 Hour Prior to Coronary CTA Appointment, Disp: 2 tablet, Rfl: 0    metroNIDAZOLE (Metrogel) 1 % gel, Apply  topically to the appropriate area as directed Every Night., Disp: 55 g, Rfl: 0    pantoprazole (PROTONIX) 40 MG EC tablet, Take 1 tablet by mouth Daily., Disp: 90 tablet, Rfl: 0    rosuvastatin (Crestor) 20 MG tablet, Take 1 tablet by mouth Daily., Disp: 90 tablet, Rfl: 1    Family History:  Family History   Problem Relation Age of Onset    Hypertension Father     Osteoarthritis Father     Osteoporosis Father     Heart disease Father     Heart disease Mother        Past Medical History:  Past Medical History:   Diagnosis Date    ADD (attention deficit disorder)     Allergic     Allergic rhinitis     Bulging lumbar disc     Chlamydia infection     Colon polyp 1989    Genital warts     Heart murmur 1979    Hyperlipidemia     Melanoma 02/2024    left calf    Osteoarthritis     Rotator cuff tear     Vitamin D deficiency        Past surgical History:  Past Surgical History:   Procedure Laterality Date    COLONOSCOPY  09/10/2018    polyps removed; 5 years repeat    SHOULDER ARTHROSCOPY W/ ROTATOR CUFF REPAIR Left 02/19/2021    Procedure: SHOULDER ARTHROSCOPY, BANKART REPAIR AND ROTATOR CUFF REPAIR;  Surgeon: Wan Mccormick MD;  Location: [facility];  Service: Orthopedics;  Laterality: Left;    SHOULDER ARTHROSCOPY W/ ROTATOR CUFF REPAIR Right 01/05/2024    Procedure: SHOULDER ARTHROSCOPY WITH ROTATOR CUFF REPAIR;  Surgeon: Wan Mccormick MD;  Location: [facility] OR;  Service: Orthopedics;  Laterality: Right;    SHOULDER SURGERY Left 02/19/2021    scope/ bankart repair    TONSILLECTOMY          Social History:  Social History     Socioeconomic History    Marital status:    Tobacco Use    Smoking status: Never     Passive exposure: Never    Smokeless tobacco: Never    Tobacco comments:     Never!   Vaping Use    Vaping status: Never Used   Substance and Sexual Activity    Alcohol use: Yes     Alcohol/week: 6.0 standard drinks of alcohol     Types: 6 Cans of beer per week    Drug use: No    Sexual activity: Yes     Partners: Female     Birth control/protection: I.U.D.       Review of Systems:  The following systems were reviewed as they relate to the cardiovascular system: Constitutional, Eyes, ENT, Cardiovascular, Respiratory, Gastrointestinal, Integumentary, Neurological, Psychiatric, Hematologic, Endocrine, Musculoskeletal, and Genitourinary. The pertinent cardiovascular findings are reported above with all other cardiovascular points within those systems being negative.    Diagnostic Study Review:     Current Electrocardiogram:  Procedures          NOTE: The following portions of the patient's history were reviewed and updated this visit as appropriate: allergies, current medications, past family history, past medical history, past social history, past surgical history and problem list.

## 2024-10-21 DIAGNOSIS — K21.9 GASTROESOPHAGEAL REFLUX DISEASE, UNSPECIFIED WHETHER ESOPHAGITIS PRESENT: ICD-10-CM

## 2024-10-21 DIAGNOSIS — F98.8 ATTENTION DEFICIT DISORDER (ADD) WITHOUT HYPERACTIVITY: ICD-10-CM

## 2024-10-21 RX ORDER — PANTOPRAZOLE SODIUM 40 MG/1
40 TABLET, DELAYED RELEASE ORAL DAILY
Qty: 90 TABLET | Refills: 0 | Status: SHIPPED | OUTPATIENT
Start: 2024-10-21

## 2024-10-21 RX ORDER — MELOXICAM 15 MG/1
15 TABLET ORAL DAILY
Qty: 30 TABLET | Refills: 2 | Status: SHIPPED | OUTPATIENT
Start: 2024-10-21

## 2024-10-21 RX ORDER — DEXTROAMPHETAMINE SACCHARATE, AMPHETAMINE ASPARTATE, DEXTROAMPHETAMINE SULFATE AND AMPHETAMINE SULFATE 5; 5; 5; 5 MG/1; MG/1; MG/1; MG/1
20 TABLET ORAL 2 TIMES DAILY
Qty: 60 TABLET | Refills: 0 | Status: SHIPPED | OUTPATIENT
Start: 2024-10-21

## 2024-10-28 DIAGNOSIS — E78.2 MIXED HYPERLIPIDEMIA: ICD-10-CM

## 2024-10-28 DIAGNOSIS — R93.1 AGATSTON CORONARY ARTERY CALCIUM SCORE BETWEEN 200 AND 399: ICD-10-CM

## 2024-10-28 RX ORDER — ROSUVASTATIN CALCIUM 20 MG/1
20 TABLET, COATED ORAL DAILY
Qty: 90 TABLET | Refills: 1 | Status: SHIPPED | OUTPATIENT
Start: 2024-10-28

## 2024-10-29 DIAGNOSIS — E78.2 MIXED HYPERLIPIDEMIA: ICD-10-CM

## 2024-10-29 DIAGNOSIS — R93.1 AGATSTON CORONARY ARTERY CALCIUM SCORE BETWEEN 200 AND 399: ICD-10-CM

## 2024-10-29 RX ORDER — ROSUVASTATIN CALCIUM 20 MG/1
20 TABLET, COATED ORAL DAILY
Qty: 90 TABLET | Refills: 1 | OUTPATIENT
Start: 2024-10-29

## 2024-11-19 DIAGNOSIS — R93.1 AGATSTON CORONARY ARTERY CALCIUM SCORE BETWEEN 200 AND 399: ICD-10-CM

## 2024-11-19 DIAGNOSIS — E78.2 MIXED HYPERLIPIDEMIA: ICD-10-CM

## 2024-11-19 RX ORDER — ROSUVASTATIN CALCIUM 20 MG/1
20 TABLET, COATED ORAL DAILY
Qty: 90 TABLET | Refills: 1 | Status: SHIPPED | OUTPATIENT
Start: 2024-11-19

## 2024-11-28 DIAGNOSIS — F98.8 ATTENTION DEFICIT DISORDER (ADD) WITHOUT HYPERACTIVITY: ICD-10-CM

## 2024-11-29 RX ORDER — DEXTROAMPHETAMINE SACCHARATE, AMPHETAMINE ASPARTATE, DEXTROAMPHETAMINE SULFATE AND AMPHETAMINE SULFATE 5; 5; 5; 5 MG/1; MG/1; MG/1; MG/1
20 TABLET ORAL 2 TIMES DAILY
Qty: 60 TABLET | Refills: 0 | Status: SHIPPED | OUTPATIENT
Start: 2024-11-29

## 2025-01-02 DIAGNOSIS — F98.8 ATTENTION DEFICIT DISORDER (ADD) WITHOUT HYPERACTIVITY: ICD-10-CM

## 2025-01-02 DIAGNOSIS — K21.9 GASTROESOPHAGEAL REFLUX DISEASE, UNSPECIFIED WHETHER ESOPHAGITIS PRESENT: ICD-10-CM

## 2025-01-02 RX ORDER — PANTOPRAZOLE SODIUM 40 MG/1
40 TABLET, DELAYED RELEASE ORAL DAILY
Qty: 90 TABLET | Refills: 0 | Status: SHIPPED | OUTPATIENT
Start: 2025-01-02

## 2025-01-02 RX ORDER — DEXTROAMPHETAMINE SACCHARATE, AMPHETAMINE ASPARTATE, DEXTROAMPHETAMINE SULFATE AND AMPHETAMINE SULFATE 5; 5; 5; 5 MG/1; MG/1; MG/1; MG/1
20 TABLET ORAL 2 TIMES DAILY
Qty: 60 TABLET | Refills: 0 | Status: SHIPPED | OUTPATIENT
Start: 2025-01-02

## 2025-01-02 RX ORDER — MELOXICAM 15 MG/1
15 TABLET ORAL DAILY
Qty: 30 TABLET | Refills: 2 | Status: SHIPPED | OUTPATIENT
Start: 2025-01-02

## 2025-01-29 DIAGNOSIS — K21.9 GASTROESOPHAGEAL REFLUX DISEASE, UNSPECIFIED WHETHER ESOPHAGITIS PRESENT: ICD-10-CM

## 2025-01-29 RX ORDER — PANTOPRAZOLE SODIUM 40 MG/1
40 TABLET, DELAYED RELEASE ORAL DAILY
Qty: 90 TABLET | Refills: 0 | Status: SHIPPED | OUTPATIENT
Start: 2025-01-29

## 2025-02-03 DIAGNOSIS — F98.8 ATTENTION DEFICIT DISORDER (ADD) WITHOUT HYPERACTIVITY: ICD-10-CM

## 2025-02-03 RX ORDER — DEXTROAMPHETAMINE SACCHARATE, AMPHETAMINE ASPARTATE, DEXTROAMPHETAMINE SULFATE AND AMPHETAMINE SULFATE 5; 5; 5; 5 MG/1; MG/1; MG/1; MG/1
20 TABLET ORAL 2 TIMES DAILY
Qty: 60 TABLET | Refills: 0 | Status: SHIPPED | OUTPATIENT
Start: 2025-02-03

## 2025-02-26 DIAGNOSIS — E78.2 MIXED HYPERLIPIDEMIA: ICD-10-CM

## 2025-02-26 DIAGNOSIS — R93.1 AGATSTON CORONARY ARTERY CALCIUM SCORE BETWEEN 200 AND 399: ICD-10-CM

## 2025-02-26 RX ORDER — ROSUVASTATIN CALCIUM 20 MG/1
20 TABLET, COATED ORAL DAILY
Qty: 90 TABLET | Refills: 0 | Status: SHIPPED | OUTPATIENT
Start: 2025-02-26

## 2025-03-20 DIAGNOSIS — F98.8 ATTENTION DEFICIT DISORDER (ADD) WITHOUT HYPERACTIVITY: ICD-10-CM

## 2025-03-20 RX ORDER — DEXTROAMPHETAMINE SACCHARATE, AMPHETAMINE ASPARTATE, DEXTROAMPHETAMINE SULFATE AND AMPHETAMINE SULFATE 5; 5; 5; 5 MG/1; MG/1; MG/1; MG/1
20 TABLET ORAL 2 TIMES DAILY
Qty: 60 TABLET | Refills: 0 | Status: SHIPPED | OUTPATIENT
Start: 2025-03-20

## 2025-03-31 ENCOUNTER — LAB (OUTPATIENT)
Dept: FAMILY MEDICINE CLINIC | Facility: CLINIC | Age: 60
End: 2025-03-31
Payer: COMMERCIAL

## 2025-03-31 ENCOUNTER — OFFICE VISIT (OUTPATIENT)
Dept: FAMILY MEDICINE CLINIC | Facility: CLINIC | Age: 60
End: 2025-03-31
Payer: COMMERCIAL

## 2025-03-31 VITALS
HEART RATE: 58 BPM | OXYGEN SATURATION: 99 % | WEIGHT: 210.6 LBS | BODY MASS INDEX: 30.15 KG/M2 | RESPIRATION RATE: 16 BRPM | SYSTOLIC BLOOD PRESSURE: 133 MMHG | TEMPERATURE: 98.7 F | DIASTOLIC BLOOD PRESSURE: 84 MMHG | HEIGHT: 70 IN

## 2025-03-31 DIAGNOSIS — K82.4 GALLBLADDER POLYP: ICD-10-CM

## 2025-03-31 DIAGNOSIS — Z00.00 PREVENTATIVE HEALTH CARE: Primary | ICD-10-CM

## 2025-03-31 DIAGNOSIS — E55.9 VITAMIN D DEFICIENCY: ICD-10-CM

## 2025-03-31 DIAGNOSIS — Z12.5 PROSTATE CANCER SCREENING: ICD-10-CM

## 2025-03-31 PROCEDURE — 82306 VITAMIN D 25 HYDROXY: CPT | Performed by: FAMILY MEDICINE

## 2025-03-31 PROCEDURE — 99396 PREV VISIT EST AGE 40-64: CPT | Performed by: FAMILY MEDICINE

## 2025-03-31 PROCEDURE — 85025 COMPLETE CBC W/AUTO DIFF WBC: CPT | Performed by: FAMILY MEDICINE

## 2025-03-31 PROCEDURE — 36415 COLL VENOUS BLD VENIPUNCTURE: CPT | Performed by: FAMILY MEDICINE

## 2025-03-31 PROCEDURE — 83036 HEMOGLOBIN GLYCOSYLATED A1C: CPT | Performed by: FAMILY MEDICINE

## 2025-03-31 PROCEDURE — G0103 PSA SCREENING: HCPCS | Performed by: FAMILY MEDICINE

## 2025-03-31 PROCEDURE — 80061 LIPID PANEL: CPT | Performed by: FAMILY MEDICINE

## 2025-03-31 PROCEDURE — 80053 COMPREHEN METABOLIC PANEL: CPT | Performed by: FAMILY MEDICINE

## 2025-03-31 RX ORDER — CHOLECALCIFEROL (VITAMIN D3) 25 MCG
1000 TABLET ORAL DAILY
COMMUNITY

## 2025-03-31 NOTE — PROGRESS NOTES
Subjective   Chief Complaint   Patient presents with    Annual Exam    Weight Management     Jovany Taveras is a 59 y.o. male.     Patient Care Team:  Debra Frazier MD as PCP - General  Luiz larios Jr., MD as Consulting Physician (Dermatology)    History of Present Illness  He is coming in today for his annual wellness exam.  He is also due for fasting blood work.  He reports having some tiredness and fatigue.  He has gained significant amount of weight over the last several years and he feels frustrated about that.  He has been working on diet and also he is working out and staying physically active, but is still cannot lose weight. Patient does not report any chest pain, shortness of breath, dizziness, nausea, vomiting, or diarrhea, visual issues, headaches, numbness or tingling. No urinary issues reported like urgency, frequency, or discomfort upon urination.  No significant weight changes reported.  No swelling reported.  No rashes or any other skin issues reported. No emotional issues or insomnia.       The following portions of the patient's history were reviewed and updated as appropriate: allergies, current medications, past family history, past medical history, past social history, past surgical history, and problem list.  Past Medical History:   Diagnosis Date    Acromioclavicular separation 2021    Left shoulder    ADD (attention deficit disorder)     Allergic     Allergic rhinitis     Ankle sprain     Arthritis of back 2010    Bulging lumbar disc     Cervical disc disorder 2014    Bulging disc    Chlamydia infection     Colon polyp 1989    Coronary artery disease 9/18/24    Dislocation, shoulder 2021    Genital warts     Heart murmur 1979    Hyperlipidemia     Low back strain     Melanoma 02/2024    left calf    Osteoarthritis     Periarthritis of shoulder     Rotator cuff syndrome 2005/2021    Right shoulder in “06”, left shoulder”21”    Rotator cuff tear     Tennis elbow 2017    Vitamin D  deficiency      Past Surgical History:   Procedure Laterality Date    COLONOSCOPY  09/10/2018    polyps removed; 5 years repeat    SHOULDER ARTHROSCOPY W/ ROTATOR CUFF REPAIR Left 02/19/2021    Procedure: SHOULDER ARTHROSCOPY, BANKART REPAIR AND ROTATOR CUFF REPAIR;  Surgeon: Wan Mccormick MD;  Location: [facility] MAIN OR;  Service: Orthopedics;  Laterality: Left;    SHOULDER ARTHROSCOPY W/ ROTATOR CUFF REPAIR Right 01/05/2024    Procedure: SHOULDER ARTHROSCOPY WITH ROTATOR CUFF REPAIR;  Surgeon: Wan Mccormick MD;  Location: [facility] MAIN OR;  Service: Orthopedics;  Laterality: Right;    SHOULDER SURGERY Left 02/19/2021    scope/ bankart repair    TONSILLECTOMY       The patient has a family history of  Family History   Problem Relation Age of Onset    Hypertension Father     Osteoarthritis Father     Osteoporosis Father     Heart disease Father     Cancer Father         Colon cancer, pancreatic cancer, lung cancer    Heart disease Mother      Social History     Socioeconomic History    Marital status:    Tobacco Use    Smoking status: Never     Passive exposure: Never    Smokeless tobacco: Never    Tobacco comments:     Never!   Vaping Use    Vaping status: Never Used   Substance and Sexual Activity    Alcohol use: Yes     Alcohol/week: 6.0 standard drinks of alcohol     Types: 6 Cans of beer per week    Drug use: No    Sexual activity: Yes     Partners: Female     Birth control/protection: I.U.D.       Review of Systems   Constitutional:  Negative for activity change, appetite change, chills, fatigue, fever, unexpected weight gain and unexpected weight loss.   HENT:  Negative for congestion, ear pain, hearing loss, nosebleeds, postnasal drip, swollen glands, tinnitus and trouble swallowing.    Eyes:  Negative for blurred vision, double vision and visual disturbance.   Respiratory:  Negative for cough, choking, chest tightness, shortness of breath, wheezing and stridor.    Cardiovascular:   "Negative for chest pain, palpitations and leg swelling.   Gastrointestinal:  Negative for abdominal distention, abdominal pain, anal bleeding, constipation, diarrhea, nausea, vomiting and GERD.   Endocrine: Negative for cold intolerance, heat intolerance and polyphagia.   Genitourinary:  Negative for dysuria, flank pain, frequency, hematuria and urgency.   Musculoskeletal:  Negative for arthralgias, back pain, gait problem, joint swelling and neck pain.   Skin:  Negative for color change, dry skin and skin lesions.   Allergic/Immunologic: Negative for environmental allergies and food allergies.   Neurological:  Negative for dizziness, tremors, speech difficulty, light-headedness, numbness, headache and confusion.   Hematological:  Negative for adenopathy. Does not bruise/bleed easily.   Psychiatric/Behavioral:  Negative for decreased concentration, sleep disturbance, depressed mood and stress.      Visit Vitals  /84 (BP Location: Left arm, Patient Position: Sitting, Cuff Size: Adult)   Pulse 58   Temp 98.7 °F (37.1 °C) (Infrared)   Resp 16   Ht 177.8 cm (70\")   Wt 95.5 kg (210 lb 9.6 oz)   SpO2 99%   BMI 30.22 kg/m²       BMI is >= 30 and <35. (Class 1 Obesity). The following options were offered after discussion;: exercise counseling/recommendations      Current Outpatient Medications:     amphetamine-dextroamphetamine (ADDERALL) 20 MG tablet, Take 1 tablet by mouth 2 (Two) Times a Day., Disp: 60 tablet, Rfl: 0    Cholecalciferol 25 MCG (1000 UT) tablet, Take 1 tablet by mouth Daily., Disp: , Rfl:     meloxicam (MOBIC) 15 MG tablet, Take 1 tablet by mouth Daily., Disp: 30 tablet, Rfl: 2    Multiple Vitamins-Minerals (OCUVITE ADULT 50+ PO), Take  by mouth., Disp: , Rfl:     pantoprazole (PROTONIX) 40 MG EC tablet, TAKE 1 TABLET BY MOUTH DAILY, Disp: 90 tablet, Rfl: 0    rosuvastatin (Crestor) 20 MG tablet, Take 1 tablet by mouth Daily., Disp: 90 tablet, Rfl: 0    Objective   Physical Exam  Vitals and nursing " note reviewed.   Constitutional:       General: He is not in acute distress.     Appearance: He is well-developed. He is not diaphoretic.   HENT:      Head: Normocephalic and atraumatic.      Right Ear: External ear normal.      Left Ear: External ear normal.   Eyes:      General: No scleral icterus.        Right eye: No discharge.         Left eye: No discharge.      Conjunctiva/sclera: Conjunctivae normal.      Pupils: Pupils are equal, round, and reactive to light.   Neck:      Thyroid: No thyromegaly.      Vascular: No JVD.   Cardiovascular:      Rate and Rhythm: Normal rate and regular rhythm.      Heart sounds: Normal heart sounds. No murmur heard.     No friction rub. No gallop.   Pulmonary:      Effort: Pulmonary effort is normal. No respiratory distress.      Breath sounds: Normal breath sounds. No stridor. No wheezing, rhonchi or rales.   Abdominal:      General: Bowel sounds are normal. There is no distension.      Palpations: Abdomen is soft. There is no mass.      Tenderness: There is no abdominal tenderness. There is no guarding or rebound.      Hernia: No hernia is present.   Musculoskeletal:         General: No swelling, tenderness or deformity. Normal range of motion.      Cervical back: Normal range of motion and neck supple. No muscular tenderness.      Right lower leg: No edema.      Left lower leg: No edema.   Lymphadenopathy:      Cervical: No cervical adenopathy.   Skin:     General: Skin is warm and dry.      Capillary Refill: Capillary refill takes less than 2 seconds.      Coloration: Skin is not pale.      Findings: No bruising, erythema, lesion or rash.   Neurological:      General: No focal deficit present.      Mental Status: He is alert and oriented to person, place, and time.      Cranial Nerves: No cranial nerve deficit.      Sensory: No sensory deficit.      Motor: No weakness.      Coordination: Coordination normal.      Deep Tendon Reflexes: Reflexes normal.   Psychiatric:          Mood and Affect: Mood normal.         Behavior: Behavior normal.         Judgment: Judgment normal.         Assessment & Plan   Diagnoses and all orders for this visit:    1. Preventative health care (Primary)  -     Comprehensive Metabolic Panel  -     Lipid Panel  -     Hemoglobin A1c  -     CBC Auto Differential    2. Prostate cancer screening  -     PSA Screen    3. Gallbladder polyp  -     US Abdomen Limited; Future    4. Vitamin D deficiency  -     Vitamin D,25-Hydroxy      Wellness exam was done today - see above for details. Healthy life style was reviewed and discussed and re-enforced. Regular exercise and healthy diet were also discussed and recommended.  Patient has gained some weight over the last several years.  We talked about possibly starting weight loss medication, but patient prefers not to proceed.  I will be getting fasting blood work.  His last colonoscopy was in 3/2024 and 3-year follow-up colonoscopy was recommended.  Immunization was also reviewed and he is up to speed with his vaccination.        Return in about 6 months (around 9/30/2025) for Next scheduled follow up.    Requested Prescriptions      No prescriptions requested or ordered in this encounter       Debra Frazier MD  03/31/2025  14:07 EDT

## 2025-04-01 LAB
25(OH)D3 SERPL-MCNC: 29.6 NG/ML (ref 30–100)
ALBUMIN SERPL-MCNC: 4.7 G/DL (ref 3.5–5.2)
ALBUMIN/GLOB SERPL: 1.9 G/DL
ALP SERPL-CCNC: 54 U/L (ref 39–117)
ALT SERPL W P-5'-P-CCNC: 26 U/L (ref 1–41)
ANION GAP SERPL CALCULATED.3IONS-SCNC: 12.6 MMOL/L (ref 5–15)
AST SERPL-CCNC: 20 U/L (ref 1–40)
BASOPHILS # BLD AUTO: 0.03 10*3/MM3 (ref 0–0.2)
BASOPHILS NFR BLD AUTO: 0.4 % (ref 0–1.5)
BILIRUB SERPL-MCNC: 0.5 MG/DL (ref 0–1.2)
BUN SERPL-MCNC: 20 MG/DL (ref 6–20)
BUN/CREAT SERPL: 21.1 (ref 7–25)
CALCIUM SPEC-SCNC: 9.4 MG/DL (ref 8.6–10.5)
CHLORIDE SERPL-SCNC: 105 MMOL/L (ref 98–107)
CHOLEST SERPL-MCNC: 149 MG/DL (ref 0–200)
CO2 SERPL-SCNC: 25.4 MMOL/L (ref 22–29)
CREAT SERPL-MCNC: 0.95 MG/DL (ref 0.76–1.27)
DEPRECATED RDW RBC AUTO: 44.3 FL (ref 37–54)
EGFRCR SERPLBLD CKD-EPI 2021: 93.4 ML/MIN/1.73
EOSINOPHIL # BLD AUTO: 0.1 10*3/MM3 (ref 0–0.4)
EOSINOPHIL NFR BLD AUTO: 1.5 % (ref 0.3–6.2)
ERYTHROCYTE [DISTWIDTH] IN BLOOD BY AUTOMATED COUNT: 13.2 % (ref 12.3–15.4)
GLOBULIN UR ELPH-MCNC: 2.5 GM/DL
GLUCOSE SERPL-MCNC: 91 MG/DL (ref 65–99)
HBA1C MFR BLD: 5.5 % (ref 4.8–5.6)
HCT VFR BLD AUTO: 42.8 % (ref 37.5–51)
HDLC SERPL-MCNC: 76 MG/DL (ref 40–60)
HGB BLD-MCNC: 13.7 G/DL (ref 13–17.7)
IMM GRANULOCYTES # BLD AUTO: 0.02 10*3/MM3 (ref 0–0.05)
IMM GRANULOCYTES NFR BLD AUTO: 0.3 % (ref 0–0.5)
LDLC SERPL CALC-MCNC: 64 MG/DL (ref 0–100)
LDLC/HDLC SERPL: 0.86 {RATIO}
LYMPHOCYTES # BLD AUTO: 2.61 10*3/MM3 (ref 0.7–3.1)
LYMPHOCYTES NFR BLD AUTO: 38.4 % (ref 19.6–45.3)
MCH RBC QN AUTO: 29 PG (ref 26.6–33)
MCHC RBC AUTO-ENTMCNC: 32 G/DL (ref 31.5–35.7)
MCV RBC AUTO: 90.5 FL (ref 79–97)
MONOCYTES # BLD AUTO: 0.43 10*3/MM3 (ref 0.1–0.9)
MONOCYTES NFR BLD AUTO: 6.3 % (ref 5–12)
NEUTROPHILS NFR BLD AUTO: 3.61 10*3/MM3 (ref 1.7–7)
NEUTROPHILS NFR BLD AUTO: 53.1 % (ref 42.7–76)
NRBC BLD AUTO-RTO: 0 /100 WBC (ref 0–0.2)
PLATELET # BLD AUTO: 238 10*3/MM3 (ref 140–450)
PMV BLD AUTO: 10.8 FL (ref 6–12)
POTASSIUM SERPL-SCNC: 4.3 MMOL/L (ref 3.5–5.2)
PROT SERPL-MCNC: 7.2 G/DL (ref 6–8.5)
PSA SERPL-MCNC: 1.04 NG/ML (ref 0–4)
RBC # BLD AUTO: 4.73 10*6/MM3 (ref 4.14–5.8)
SODIUM SERPL-SCNC: 143 MMOL/L (ref 136–145)
TRIGL SERPL-MCNC: 39 MG/DL (ref 0–150)
VLDLC SERPL-MCNC: 9 MG/DL (ref 5–40)
WBC NRBC COR # BLD AUTO: 6.8 10*3/MM3 (ref 3.4–10.8)

## 2025-04-14 ENCOUNTER — HOSPITAL ENCOUNTER (OUTPATIENT)
Dept: ULTRASOUND IMAGING | Facility: HOSPITAL | Age: 60
Discharge: HOME | End: 2025-04-14
Admitting: FAMILY MEDICINE
Payer: COMMERCIAL

## 2025-04-14 DIAGNOSIS — K82.4 GALLBLADDER POLYP: ICD-10-CM

## 2025-04-14 PROCEDURE — 76705 ECHO EXAM OF ABDOMEN: CPT

## 2025-04-18 ENCOUNTER — OFFICE VISIT (OUTPATIENT)
Dept: CARDIOLOGY | Facility: CLINIC | Age: 60
End: 2025-04-18
Payer: COMMERCIAL

## 2025-04-18 VITALS
SYSTOLIC BLOOD PRESSURE: 110 MMHG | HEIGHT: 70 IN | BODY MASS INDEX: 29.75 KG/M2 | WEIGHT: 207.8 LBS | HEART RATE: 68 BPM | OXYGEN SATURATION: 98 % | DIASTOLIC BLOOD PRESSURE: 73 MMHG

## 2025-04-18 DIAGNOSIS — R93.1 ELEVATED CORONARY ARTERY CALCIUM SCORE: Primary | ICD-10-CM

## 2025-04-18 DIAGNOSIS — E78.2 MIXED HYPERLIPIDEMIA: ICD-10-CM

## 2025-04-18 DIAGNOSIS — R93.1 AGATSTON CORONARY ARTERY CALCIUM SCORE BETWEEN 200 AND 399: ICD-10-CM

## 2025-04-18 PROCEDURE — 93000 ELECTROCARDIOGRAM COMPLETE: CPT | Performed by: INTERNAL MEDICINE

## 2025-04-18 PROCEDURE — 99214 OFFICE O/P EST MOD 30 MIN: CPT | Performed by: INTERNAL MEDICINE

## 2025-04-18 RX ORDER — ROSUVASTATIN CALCIUM 20 MG/1
20 TABLET, COATED ORAL DAILY
Qty: 90 TABLET | Refills: 3 | Status: SHIPPED | OUTPATIENT
Start: 2025-04-18

## 2025-04-18 NOTE — PROGRESS NOTES
Cardiology Office Visit      Encounter Date:  04/18/2025    Patient ID:   Jovany Taveras is a 60 y.o. male.    Reason For Followup:  Hyperlipidemia  Abnormal elevated calcium score  Coronary artery disease    Brief Clinical History:  Dear Debra Valle MD    I had the pleasure of seeing Jovany Taveras today. As you are well aware, this is a 60 y.o. male past medical history that is significant for history of family history of coronary artery disease history of hyperlipidemia elevated calcium score presented for further evaluation and treatment options    Interval History:    Family history of coronary artery disease  Abnormal elevated calcium score  Nonspecific symptoms  No syncope  Good functional status  Tolerating current dose of Crestor without any significant side effect  Of some nonspecific symptoms with occasional feeling that he feels his heartbeat in certain situations especially when he comes out of the heart bathtub and also during stressful episodes  Assessment & Plan    Impressions:  Hyperlipidemia  Elevated calcium score 200  Family history of coronary artery disease  . Total calcium score 215 .   2.  Right dominant coronary system.  Dense calcified plaque in the proximal LAD resulting in likely moderate (50-69%) luminal stenosis within limits of calcium blooming artifact.  CAD-RADS 3/P2.   3. LV normal in size. LVEF 68% as estimated by cine looping of all available phases.  No obvious wall motion abnormalities.         Recommendations:    Prior workup reviewed and discussed with patient  Diagnosis workup and treatment options prior available medical records reviewed and discussed with patient  Close monitoring of lipase  CT coronary angiogram findings reviewed and discussed with patient  Continued aggressive risk factor modification  Start Crestor 20 mg p.o. once a day  Lipids are optimal  Some nonspecific symptoms of being aware of his heart rate rate patient is advised to consider close  "monitoring on Apple Watch and record some EKG rhythm strips during the episode send we can consider extended Holter monitor if we need to in future if there is any abnormality  Patient is advised to consider monitoring his blood pressure at home  Diagnosis and treatment options reviewed and discussed with patient  Continue close monitoring and follow-up  Send labs and workup reviewed and discussed with the patient  Follow-up in office in 6 months        Vitals:  Vitals:    04/18/25 1308   BP: 110/73   BP Location: Left arm   Patient Position: Sitting   Cuff Size: Large Adult   Pulse: 68   SpO2: 98%   Weight: 94.3 kg (207 lb 12.8 oz)   Height: 177.8 cm (70\")       Physical Exam:    General: Alert, cooperative, no distress, appears stated age  Head:  Normocephalic, atraumatic, mucous membranes moist  Eyes:  Conjunctiva/corneas clear, EOM's intact     Neck:  Supple,  no adenopathy;      Lungs: Clear to auscultation bilaterally, no wheezes rhonchi rales are noted  Chest wall: No tenderness  Heart::  Regular rate and rhythm, S1 and S2 normal, no murmur, rub or gallop  Abdomen: Soft, non-tender, nondistended bowel sounds active  Extremities: No cyanosis, clubbing, or edema  Pulses: 2+ and symmetric all extremities  Skin:  No rashes or lesions  Neuro/psych: A&O x3. CN II through XII are grossly intact with appropriate affect              Lab Results   Component Value Date    GLUCOSE 91 03/31/2025    BUN 20 03/31/2025    CREATININE 0.95 03/31/2025    EGFR 93.4 03/31/2025    BCR 21.1 03/31/2025    K 4.3 03/31/2025    CO2 25.4 03/31/2025    CALCIUM 9.4 03/31/2025    ALBUMIN 4.7 03/31/2025    BILITOT 0.5 03/31/2025    AST 20 03/31/2025    ALT 26 03/31/2025        No results found for this or any previous visit.     Lab Results   Component Value Date    CHOL 149 03/31/2025    TRIG 39 03/31/2025    HDL 76 (H) 03/31/2025    LDL 64 03/31/2025                Objective:          Allergies:  Allergies   Allergen Reactions    " Penicillins Hives and Other (See Comments)    Pseudoephedrine Hives and Other (See Comments)       Medication Review:     Current Outpatient Medications:     amphetamine-dextroamphetamine (ADDERALL) 20 MG tablet, Take 1 tablet by mouth 2 (Two) Times a Day., Disp: 60 tablet, Rfl: 0    Cholecalciferol 25 MCG (1000 UT) tablet, Take 1 tablet by mouth Daily., Disp: , Rfl:     meloxicam (MOBIC) 15 MG tablet, Take 1 tablet by mouth Daily., Disp: 30 tablet, Rfl: 2    Multiple Vitamins-Minerals (OCUVITE ADULT 50+ PO), Take  by mouth., Disp: , Rfl:     pantoprazole (PROTONIX) 40 MG EC tablet, TAKE 1 TABLET BY MOUTH DAILY, Disp: 90 tablet, Rfl: 0    rosuvastatin (Crestor) 20 MG tablet, Take 1 tablet by mouth Daily., Disp: 90 tablet, Rfl: 3    Family History:  Family History   Problem Relation Age of Onset    Hypertension Father     Osteoarthritis Father     Osteoporosis Father     Heart disease Father     Cancer Father         Colon cancer, pancreatic cancer, lung cancer    Heart disease Mother        Past Medical History:  Past Medical History:   Diagnosis Date    Acromioclavicular separation 2021    Left shoulder    ADD (attention deficit disorder)     Allergic     Allergic rhinitis     Ankle sprain     Arthritis of back 2010    Bulging lumbar disc     Cervical disc disorder 2014    Bulging disc    Chlamydia infection     Colon polyp 1989    Coronary artery disease 9/18/24    Dislocation, shoulder 2021    Genital warts     Heart murmur 1979    Hyperlipidemia     Low back strain     Melanoma 02/2024    left calf    Osteoarthritis     Periarthritis of shoulder     Rotator cuff syndrome 2005/2021    Right shoulder in “06”, left shoulder”21”    Rotator cuff tear     Tennis elbow 2017    Vitamin D deficiency        Past surgical History:  Past Surgical History:   Procedure Laterality Date    COLONOSCOPY  09/10/2018    polyps removed; 5 years repeat    SHOULDER ARTHROSCOPY W/ ROTATOR CUFF REPAIR Left 02/19/2021    Procedure:  SHOULDER ARTHROSCOPY, BANKART REPAIR AND ROTATOR CUFF REPAIR;  Surgeon: Wan Mccormick MD;  [facility];  Service: Orthopedics;  Laterality: Left;    SHOULDER ARTHROSCOPY W/ ROTATOR CUFF REPAIR Right 01/05/2024    Procedure: SHOULDER ARTHROSCOPY WITH ROTATOR CUFF REPAIR;  Surgeon: Wan Mccormick MD;  [facility];  Service: Orthopedics;  Laterality: Right;    SHOULDER SURGERY Left 02/19/2021    scope/ bankart repair    TONSILLECTOMY         Social History:  Social History     Socioeconomic History    Marital status:    Tobacco Use    Smoking status: Never     Passive exposure: Never    Smokeless tobacco: Never    Tobacco comments:     Never!   Vaping Use    Vaping status: Never Used   Substance and Sexual Activity    Alcohol use: Yes     Alcohol/week: 6.0 standard drinks of alcohol     Types: 6 Cans of beer per week     Comment: drink's only on weekends    Drug use: No    Sexual activity: Yes     Partners: Female     Birth control/protection: I.U.D.       Review of Systems:  The following systems were reviewed as they relate to the cardiovascular system: Constitutional, Eyes, ENT, Cardiovascular, Respiratory, Gastrointestinal, Integumentary, Neurological, Psychiatric, Hematologic, Endocrine, Musculoskeletal, and Genitourinary. The pertinent cardiovascular findings are reported above with all other cardiovascular points within those systems being negative.    Diagnostic Study Review:     Current Electrocardiogram:    ECG 12 Lead    Date/Time: 4/18/2025 1:35 PM  Performed by: Wesley Howell MD    Authorized by: Wesley Howell MD  Comparison: compared with previous ECG   Similar to previous ECG  Rhythm: sinus rhythm  Rate: normal  BPM: 8  Conduction: conduction normal  ST Segments: ST segments normal  T Waves: T waves normal  QRS axis: normal    Clinical impression: normal ECG                NOTE: The following portions of the patient's history were  reviewed and updated this visit as appropriate: allergies, current medications, past family history, past medical history, past social history, past surgical history and problem list.

## 2025-04-25 DIAGNOSIS — F98.8 ATTENTION DEFICIT DISORDER (ADD) WITHOUT HYPERACTIVITY: ICD-10-CM

## 2025-04-25 RX ORDER — DEXTROAMPHETAMINE SACCHARATE, AMPHETAMINE ASPARTATE, DEXTROAMPHETAMINE SULFATE AND AMPHETAMINE SULFATE 5; 5; 5; 5 MG/1; MG/1; MG/1; MG/1
20 TABLET ORAL 2 TIMES DAILY
Qty: 60 TABLET | Refills: 0 | Status: SHIPPED | OUTPATIENT
Start: 2025-04-25

## 2025-04-25 RX ORDER — CHOLECALCIFEROL (VITAMIN D3) 25 MCG
1000 TABLET ORAL DAILY
Qty: 90 TABLET | Refills: 1 | Status: SHIPPED | OUTPATIENT
Start: 2025-04-25

## 2025-05-04 DIAGNOSIS — K21.9 GASTROESOPHAGEAL REFLUX DISEASE, UNSPECIFIED WHETHER ESOPHAGITIS PRESENT: ICD-10-CM

## 2025-05-04 RX ORDER — PANTOPRAZOLE SODIUM 40 MG/1
40 TABLET, DELAYED RELEASE ORAL DAILY
Qty: 90 TABLET | Refills: 0 | Status: SHIPPED | OUTPATIENT
Start: 2025-05-04

## 2025-05-07 RX ORDER — MELOXICAM 15 MG/1
15 TABLET ORAL DAILY
Qty: 30 TABLET | Refills: 2 | Status: SHIPPED | OUTPATIENT
Start: 2025-05-07

## 2025-05-27 DIAGNOSIS — F98.8 ATTENTION DEFICIT DISORDER (ADD) WITHOUT HYPERACTIVITY: ICD-10-CM

## 2025-05-27 RX ORDER — DEXTROAMPHETAMINE SACCHARATE, AMPHETAMINE ASPARTATE, DEXTROAMPHETAMINE SULFATE AND AMPHETAMINE SULFATE 5; 5; 5; 5 MG/1; MG/1; MG/1; MG/1
20 TABLET ORAL 2 TIMES DAILY
Qty: 60 TABLET | Refills: 0 | Status: SHIPPED | OUTPATIENT
Start: 2025-05-27

## 2025-06-01 NOTE — PROGRESS NOTES
Physical Therapy Daily Progress Note    VISIT#: 10    Subjective   Jovany Taveras reports: Did a lot of walking yesterday, is sore from that but the shoulder is ok. Sleeping seems to be getting better, but it does still hurt sometimes while he is in bed.       Objective     See Exercise, Manual, and Modality Logs for complete treatment.   Add: prone scap strength   Patient Education: protocol phases     Assessment/Plan  Good ryley to new prone scap strength today. No pain during session, tightness c/o during PROM, no sharp pains.     Progress per Plan of Care            Timed:         Manual Therapy:    21     mins  22977;     Therapeutic Exercise:    14    mins  19856;     Neuromuscular Gilberto:        mins  78511;    Therapeutic Activity:     10     mins  31310;     Gait Training:           mins  85008;     Ultrasound:          mins  18401;    Ionto                                   mins   26126  Self Care                            mins   79303  Canalith Repos                   mins  90870    Un-Timed:  Electrical Stimulation:    15     mins  55674 ( );  Traction          mins 16126  Dry Needle                 ______ mins DRYNDL  Low Eval          Mins  85576  Mod Eval          Mins  32225  High Eval                            Mins  91850  Re-Eval                               mins  18688    Timed Treatment:   45   mins   Total Treatment:     62   mins    Elena Roca, PT    Physical Therapist  
Yes - the patient is able to be screened

## 2025-07-01 DIAGNOSIS — F98.8 ATTENTION DEFICIT DISORDER (ADD) WITHOUT HYPERACTIVITY: ICD-10-CM

## 2025-07-01 RX ORDER — DEXTROAMPHETAMINE SACCHARATE, AMPHETAMINE ASPARTATE, DEXTROAMPHETAMINE SULFATE AND AMPHETAMINE SULFATE 5; 5; 5; 5 MG/1; MG/1; MG/1; MG/1
20 TABLET ORAL 2 TIMES DAILY
Qty: 60 TABLET | Refills: 0 | Status: SHIPPED | OUTPATIENT
Start: 2025-07-01

## 2025-07-22 DIAGNOSIS — K21.9 GASTROESOPHAGEAL REFLUX DISEASE, UNSPECIFIED WHETHER ESOPHAGITIS PRESENT: ICD-10-CM

## 2025-07-22 RX ORDER — PANTOPRAZOLE SODIUM 40 MG/1
40 TABLET, DELAYED RELEASE ORAL DAILY
Qty: 90 TABLET | Refills: 0 | Status: SHIPPED | OUTPATIENT
Start: 2025-07-22

## 2025-07-22 RX ORDER — CHOLECALCIFEROL (VITAMIN D3) 25 MCG
1000 TABLET ORAL DAILY
Qty: 90 TABLET | Refills: 1 | Status: SHIPPED | OUTPATIENT
Start: 2025-07-22

## 2025-07-24 DIAGNOSIS — R93.1 AGATSTON CORONARY ARTERY CALCIUM SCORE BETWEEN 200 AND 399: ICD-10-CM

## 2025-07-24 DIAGNOSIS — E78.2 MIXED HYPERLIPIDEMIA: ICD-10-CM

## 2025-07-24 RX ORDER — ROSUVASTATIN CALCIUM 20 MG/1
20 TABLET, COATED ORAL DAILY
Qty: 90 TABLET | Refills: 0 | Status: SHIPPED | OUTPATIENT
Start: 2025-07-24

## 2025-08-03 DIAGNOSIS — K21.9 GASTROESOPHAGEAL REFLUX DISEASE, UNSPECIFIED WHETHER ESOPHAGITIS PRESENT: ICD-10-CM

## 2025-08-03 DIAGNOSIS — F98.8 ATTENTION DEFICIT DISORDER (ADD) WITHOUT HYPERACTIVITY: ICD-10-CM

## 2025-08-04 DIAGNOSIS — K21.9 GASTROESOPHAGEAL REFLUX DISEASE, UNSPECIFIED WHETHER ESOPHAGITIS PRESENT: ICD-10-CM

## 2025-08-04 RX ORDER — DEXTROAMPHETAMINE SACCHARATE, AMPHETAMINE ASPARTATE, DEXTROAMPHETAMINE SULFATE AND AMPHETAMINE SULFATE 5; 5; 5; 5 MG/1; MG/1; MG/1; MG/1
20 TABLET ORAL 2 TIMES DAILY
Qty: 60 TABLET | Refills: 0 | Status: SHIPPED | OUTPATIENT
Start: 2025-08-04

## 2025-08-04 RX ORDER — MELOXICAM 15 MG/1
15 TABLET ORAL DAILY
Qty: 30 TABLET | Refills: 2 | Status: SHIPPED | OUTPATIENT
Start: 2025-08-04

## 2025-08-04 RX ORDER — PANTOPRAZOLE SODIUM 40 MG/1
40 TABLET, DELAYED RELEASE ORAL DAILY
Qty: 90 TABLET | Refills: 0 | Status: SHIPPED | OUTPATIENT
Start: 2025-08-04

## 2025-08-04 RX ORDER — PANTOPRAZOLE SODIUM 40 MG/1
40 TABLET, DELAYED RELEASE ORAL DAILY
Qty: 90 TABLET | Refills: 0 | OUTPATIENT
Start: 2025-08-04

## (undated) DEVICE — ABL ASP APOLLO RF XL 90D

## (undated) DEVICE — GLV SURG SIGNATURE ESSENTIAL PF LTX SZ8.5

## (undated) DEVICE — ANTIBACTERIAL UNDYED BRAIDED (POLYGLACTIN 910), SYNTHETIC ABSORBABLE SUTURE: Brand: COATED VICRYL

## (undated) DEVICE — CVR HNDL LT SURG ACCSSRY BLU STRL

## (undated) DEVICE — TBG ARTHSCP PT W CONN/REDUC 8FT

## (undated) DEVICE — UNDERGLV SURG BIOGEL INDICAT PI SZ8 BLU

## (undated) DEVICE — TP NDL SCORPION MULTIFIRE

## (undated) DEVICE — SUT PDS 1 CTX 36IN VIO PDP371T

## (undated) DEVICE — BLD SHAVER EXCALIBUR CRV 4MM

## (undated) DEVICE — GLV SURG SENSICARE PI ORTHO SZ8 LF STRL

## (undated) DEVICE — TUBING, SUCTION, 1/4" X 12', STRAIGHT: Brand: MEDLINE

## (undated) DEVICE — PK SHLDR ARTHROSCOPY 50

## (undated) DEVICE — SPNG GZ AVANT 6PLY 4X4IN STRL PK/2

## (undated) DEVICE — CANN PASSPORT BUTN 8MM 3CM

## (undated) DEVICE — TBG ARTHSCP PUMP W CONN/REDUC 8FT

## (undated) DEVICE — CANN PASSPORT BUTN 8MM 4CM

## (undated) DEVICE — GLV SURG SENSICARE PI LF PF 8 GRN STRL

## (undated) DEVICE — GOWN,SLEEVE,STERILE,W/CSR WRAP,1/P: Brand: MEDLINE

## (undated) DEVICE — CANN TRPL DAM 7X7MM

## (undated) DEVICE — UNDRGLV SURG BIOGEL PIMICROINDICATOR SYNTH SZ8 LF STRL

## (undated) DEVICE — SUTURELASSO CRV 25D LT

## (undated) DEVICE — DRSNG SURESITE WNDW 4X4.5

## (undated) DEVICE — TBG ARTHSCP DUALWAVE

## (undated) DEVICE — PAD,ABDOMINAL,5"X9",STERILE,LF,1/PK: Brand: MEDLINE INDUSTRIES, INC.

## (undated) DEVICE — UNDERGLV SURG BIOGEL/PI PF SYNTH SURG SZ8.5 BLU 50/BX

## (undated) DEVICE — SLV SCD CALF HEMOFORCE DVT THERP REPROC MD

## (undated) DEVICE — GLV SURG SIGNATURE ESSENTIAL PF LTX SZ8

## (undated) DEVICE — SUT 1/0 27 PDS II VIO MONO CT Z353H

## (undated) DEVICE — KT PUSHLOCK INSRT PERC 2.9MM

## (undated) DEVICE — KT SURG TURNOVER 050

## (undated) DEVICE — ADHS LIQ MASTISOL 2/3ML

## (undated) DEVICE — SOL IRR NACL 0.9PCT ARTHROMATIC 3000ML

## (undated) DEVICE — UNDERGLV SURG BIOGEL INDICAT PI SZ8.5 BLU

## (undated) DEVICE — SUT VIC 3/0 CT2 27IN J232H